# Patient Record
Sex: FEMALE | Race: WHITE | Employment: UNEMPLOYED | URBAN - METROPOLITAN AREA
[De-identification: names, ages, dates, MRNs, and addresses within clinical notes are randomized per-mention and may not be internally consistent; named-entity substitution may affect disease eponyms.]

---
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
## 2019-04-09 ENCOUNTER — APPOINTMENT (OUTPATIENT)
Dept: GENERAL RADIOLOGY | Age: 19
End: 2019-04-09
Payer: COMMERCIAL

## 2019-04-09 ENCOUNTER — HOSPITAL ENCOUNTER (EMERGENCY)
Age: 19
Discharge: HOME | End: 2019-04-09
Payer: COMMERCIAL

## 2019-04-09 VITALS
OXYGEN SATURATION: 100 % | RESPIRATION RATE: 14 BRPM | TEMPERATURE: 98.2 F | BODY MASS INDEX: 24.83 KG/M2 | WEIGHT: 149 LBS | HEIGHT: 65 IN | SYSTOLIC BLOOD PRESSURE: 126 MMHG | DIASTOLIC BLOOD PRESSURE: 69 MMHG | HEART RATE: 105 BPM

## 2019-04-09 DIAGNOSIS — S69.92XA INJURY OF LEFT HAND, INITIAL ENCOUNTER: Primary | ICD-10-CM

## 2019-04-09 PROCEDURE — 73110 X-RAY EXAM OF WRIST: CPT

## 2019-04-09 PROCEDURE — 73130 X-RAY EXAM OF HAND: CPT

## 2019-04-09 PROCEDURE — 99283 EMERGENCY DEPT VISIT LOW MDM: CPT

## 2019-04-09 RX ORDER — NAPROXEN 500 MG/1
500 TABLET ORAL 2 TIMES DAILY
Qty: 20 TABLET | Refills: 0 | Status: SHIPPED | OUTPATIENT
Start: 2019-04-09 | End: 2020-10-14

## 2019-04-09 RX ORDER — POLYETHYLENE GLYCOL 3350 17 G/17G
17 POWDER, FOR SOLUTION ORAL
COMMUNITY
Start: 2013-12-16 | End: 2020-10-14

## 2019-04-09 ASSESSMENT — PAIN SCALES - GENERAL: PAINLEVEL_OUTOF10: 9

## 2019-04-09 ASSESSMENT — PAIN DESCRIPTION - ORIENTATION: ORIENTATION: LEFT

## 2019-04-09 ASSESSMENT — PAIN DESCRIPTION - PAIN TYPE: TYPE: ACUTE PAIN

## 2019-04-09 ASSESSMENT — PAIN DESCRIPTION - LOCATION: LOCATION: HAND

## 2019-04-09 NOTE — ED PROVIDER NOTES
erythema, rash or wounds noted. Neurovascular: Motor deficit: none. Sensory deficit:   none. Pulse deficit: none. Capillary refill: normal.  Fingers:  all            Tenderness:  none. Swelling: None. Deformity: no.              ROM: full range of motion. Skin:  no erythema, rash or wounds noted. Wrist:               Tenderness:  Mild radial and ulnar, no scaphoid tenderness. Swelling: None. Deformity: no.             ROM: full range of motion. Skin:  no erythema, rash or wounds noted. Lymphatics: No lymphangitis or adenopathy noted. Neurological:  Oriented. Motor functions intact. t. Lab / Imaging Results   (All laboratory and radiology results have been personally reviewed by myself)  Labs:  No results found for this visit on 04/09/19. Imaging: All Radiology results interpreted by Radiologist unless otherwise noted. XR WRIST LEFT (MIN 3 VIEWS)   Final Result   No acute findings. XR HAND LEFT (MIN 3 VIEWS)   Final Result   No acute findings. ED Course / Medical Decision Making   Medications - No data to display     Re-examination:  4/9/19       Time: 0351   Patient's symptoms of the same. Results discussed. She understands that she is to call workers comp today to schedule an appointment tomorrow morning. Consult(s):   None    Procedure(s):   none    MDM:    Films were obtained and are negative for fracture. Plan is subsequently for symptom control, limited use and  immobilization with appropriate outpatient follow-up. RICE therapy discussed. She is to call workers compensation today to schedule a follow-up appointment tomorrow morning. Counseling: The emergency provider has spoken with the patient and discussed todays results, in addition to providing specific details for the plan of care and counseling regarding the diagnosis and prognosis.   Questions

## 2019-04-26 ENCOUNTER — OFFICE VISIT (OUTPATIENT)
Dept: NEUROLOGY | Age: 19
End: 2019-04-26
Payer: COMMERCIAL

## 2019-04-26 VITALS
DIASTOLIC BLOOD PRESSURE: 80 MMHG | HEIGHT: 64 IN | WEIGHT: 170 LBS | SYSTOLIC BLOOD PRESSURE: 130 MMHG | BODY MASS INDEX: 29.02 KG/M2

## 2019-04-26 DIAGNOSIS — G43.C1 INTRACTABLE PERIODIC HEADACHE SYNDROME: ICD-10-CM

## 2019-04-26 DIAGNOSIS — F32.A ANXIETY AND DEPRESSION: Chronic | ICD-10-CM

## 2019-04-26 DIAGNOSIS — R51.9 CHRONIC DAILY HEADACHE: Chronic | ICD-10-CM

## 2019-04-26 DIAGNOSIS — G43.809 HEADACHE, VARIANT MIGRAINE: ICD-10-CM

## 2019-04-26 DIAGNOSIS — G44.041 INTRACTABLE CHRONIC PAROXYSMAL HEMICRANIA: Primary | ICD-10-CM

## 2019-04-26 DIAGNOSIS — F41.9 ANXIETY AND DEPRESSION: Chronic | ICD-10-CM

## 2019-04-26 PROBLEM — G43.C0 PERIODIC HEADACHE SYNDROME: Status: ACTIVE | Noted: 2019-04-26

## 2019-04-26 PROCEDURE — 1036F TOBACCO NON-USER: CPT | Performed by: PSYCHIATRY & NEUROLOGY

## 2019-04-26 PROCEDURE — 99204 OFFICE O/P NEW MOD 45 MIN: CPT | Performed by: PSYCHIATRY & NEUROLOGY

## 2019-04-26 PROCEDURE — G8427 DOCREV CUR MEDS BY ELIG CLIN: HCPCS | Performed by: PSYCHIATRY & NEUROLOGY

## 2019-04-26 PROCEDURE — G8419 CALC BMI OUT NRM PARAM NOF/U: HCPCS | Performed by: PSYCHIATRY & NEUROLOGY

## 2019-04-26 RX ORDER — TOPIRAMATE 25 MG/1
TABLET ORAL
Qty: 186 TABLET | Refills: 3 | Status: SHIPPED | OUTPATIENT
Start: 2019-04-26 | End: 2019-06-17 | Stop reason: SINTOL

## 2019-04-26 ASSESSMENT — ENCOUNTER SYMPTOMS
ALLERGIC/IMMUNOLOGIC NEGATIVE: 1
EYES NEGATIVE: 1
RESPIRATORY NEGATIVE: 1
GASTROINTESTINAL NEGATIVE: 1

## 2019-04-26 NOTE — PROGRESS NOTES
TABS tablet Take 40 mg by mouth daily.  Sertraline HCl (ZOLOFT PO) Take  by mouth.  naproxen (NAPROSYN) 500 MG tablet Take 1 tablet by mouth 2 times daily for 10 days 20 tablet 0     No current facility-administered medications for this visit.         No Known Allergies    Patient Active Problem List   Diagnosis    Chronic daily headache    Headache, variant migraine    Anxiety and depression       Past Medical History:   Diagnosis Date    Anxiety and depression 4/26/2019    Chronic daily headache 4/26/2019    Constipation     hx of    Constipation, chronic     Depression     Hallucination     Periodic headache syndrome 4/26/2019       Past Surgical History:   Procedure Laterality Date    FRACTURE SURGERY  july 2014    right ankle    MYRINGOTOMY AND TYMPANOSTOMY TUBE PLACEMENT         Family History   Problem Relation Age of Onset    Migraines Mother      Social History     Socioeconomic History    Marital status: Single     Spouse name: Not on file    Number of children: Not on file    Years of education: Not on file    Highest education level: Not on file   Occupational History    Not on file   Social Needs    Financial resource strain: Not on file    Food insecurity:     Worry: Not on file     Inability: Not on file    Transportation needs:     Medical: Not on file     Non-medical: Not on file   Tobacco Use    Smoking status: Never Smoker    Smokeless tobacco: Never Used   Substance and Sexual Activity    Alcohol use: No    Drug use: No    Sexual activity: Never   Lifestyle    Physical activity:     Days per week: Not on file     Minutes per session: Not on file    Stress: Not on file   Relationships    Social connections:     Talks on phone: Not on file     Gets together: Not on file     Attends Rastafarian service: Not on file     Active member of club or organization: Not on file     Attends meetings of clubs or organizations: Not on file     Relationship status: Not on file  Intimate partner violence:     Fear of current or ex partner: Not on file     Emotionally abused: Not on file     Physically abused: Not on file     Forced sexual activity: Not on file   Other Topics Concern    Not on file   Social History Narrative    Not on file     Review of Systems   Constitutional: Negative. HENT: Negative. Eyes: Negative. Respiratory: Negative. Cardiovascular: Negative. Gastrointestinal: Negative. Endocrine: Negative. Genitourinary: Negative. Musculoskeletal: Negative. Skin: Negative. Allergic/Immunologic: Negative. Neurological: Positive for headaches. [address] pattern, migraine variant   Hematological: Negative. Psychiatric/Behavioral: Positive for dysphoric mood. The patient is nervous/anxious. Neurologic Exam:  /80 (Site: Right Upper Arm, Position: Sitting, Cuff Size: Medium Adult)   Ht 5' 4\" (1.626 m)   Wt 170 lb (77.1 kg)   LMP 03/24/2019   BMI 29.18 kg/m²   General appearance: Alert, cooperative, flat affect, well-nourished, seated on the exam table in the company of her father, no acute distress  HEENT: Normocephalic/atraumatic.   Neck: Supple  Cardiac: RRR  Respiratory: grossly clear  Extremities: No edema, erythema  Skin: No lesions or rashes  Musculoskeletal: Negative bilateral straight leg raising test, no fasciculations or tremor  Mental Status: Alert, oriented ×3  Speech/Language: Clear, fluent  Attention span/Concentration: Mildly reduced with easy distractibility  Affect/Mood: Flat affect, dysthymic mood, decreased facial expression, reduced eye contact  Insight/Judgement: Unable to accurately assess     Fund of Knowledge/Current events: Grossly intact  CN II-XII:     Pupils: Equal, reactive to light, 1.5 mm     EOM's: Full without nystagmus    Visual Fields: Full to confrontation    Fundi: Miosis from light  CN V: normal V1-V3  CN VII: No facial droop, symmetric smile, decreased facial expression   CN VIII: Hearing grossly intact   CN IX-XII: No palatal asymmetry, tongue midline  SCM/Trapezii: 5/5 power  Motor: 5/5 power in the upper and lower extremities without tremor or drift and normal motor tone without cogwheeling or spasticity; intact fine motor function of both hands, symmetric  DTR's: 1+ and symmetric in the upper and lower extremities, no ankle clonus, plantar responses are flexor  Sensory: grossly intact subjectively to light touch and sharp stick testing  Coordination/Gait: No gross limb dysmetria, truncal or cerebellar gait ataxia. Assessment/Plan:  1. 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 2. Chronic daily headache pattern. 3. Comorbidities of anxiety/depression, dysthymia, treated with psychotropic medications of Zoloft and Latuda. 4. For preventative headache pain management, we discussed the 3 major preventative headache medications including vascular, antidepressant and seizure pain medications. Because of the medications she is already being prescribed, the antidepressant pain medications cannot be utilized such as Elavil, nortriptyline or doxepin. We decided to try topiramate 25 mg twice daily for 1 week, then advance by 25 mg doses weekly to 25 mg the morning and 50 mg at bedtime for 1 week, then 50 mg twice daily for 1 week, then advance further to 50 mg in the morning and 75 mg at bedtime for 1 week, then 75 mg twice daily as clinically indicated and as tolerated. 5. Additional lab tests are ordered as specified below. 6. She was advised to keep a headache calendar and follow-up in the Neurology clinic in 6 weeks otherwise. 7. She was provided patient information from the [email or web address] [facility] website. Sincerely,      Cedric Emmanuel MD    This note was created using speech recognition transcription software.  Despite proofreading, there may be several typographical errors present that may affect the meaning of the content. Please call with any questions. Note: More than 50% of this 40-minute face-face visit time included counseling and coordination of care based on clinical impression, review of test results, treatment plan, risk factor reduction and patient and/or family education.     Orders Placed This Encounter   Procedures    CBC WITH DIFFERENTIAL/PLATELET     Standing Status:   Future     Standing Expiration Date:   4/26/2020    Comprehensive Metabolic Panel     Standing Status:   Future     Standing Expiration Date:   4/26/2020    Magnesium     Standing Status:   Future     Standing Expiration Date:   4/26/2020    Vitamin B12 & Folate     Standing Status:   Future     Standing Expiration Date:   4/26/2020    Vitamin D 25 Hydroxy     Standing Status:   Future     Standing Expiration Date:   4/26/2020    RPR Reflex to Titer and TPPA     Standing Status:   Future     Standing Expiration Date:   4/26/2020    Sedimentation Rate     Standing Status:   Future     Standing Expiration Date:   4/26/2020    C-Reactive Protein     Standing Status:   Future     Standing Expiration Date:   4/26/2020    Ammonia     Standing Status:   Future     Standing Expiration Date:   4/26/2020

## 2019-05-08 ENCOUNTER — TELEPHONE (OUTPATIENT)
Dept: NEUROLOGY | Age: 19
End: 2019-05-08

## 2019-05-08 ENCOUNTER — HOSPITAL ENCOUNTER (OUTPATIENT)
Age: 19
Discharge: HOME | End: 2019-05-08
Payer: COMMERCIAL

## 2019-05-08 DIAGNOSIS — G44.041 INTRACTABLE CHRONIC PAROXYSMAL HEMICRANIA: ICD-10-CM

## 2019-05-08 DIAGNOSIS — G43.809 HEADACHE, VARIANT MIGRAINE: ICD-10-CM

## 2019-05-08 LAB
ALBUMIN SERPL-MCNC: 4.3 G/DL (ref 3.5–5.2)
ALP BLD-CCNC: 65 U/L (ref 35–104)
ALT SERPL-CCNC: 14 U/L (ref 0–32)
AMMONIA: 20 UMOL/L (ref 11–51)
ANION GAP SERPL CALCULATED.3IONS-SCNC: 9 MMOL/L (ref 7–16)
AST SERPL-CCNC: 25 U/L (ref 0–31)
BASOPHILS ABSOLUTE: 0.04 E9/L (ref 0–0.2)
BASOPHILS RELATIVE PERCENT: 1.1 % (ref 0–2)
BILIRUB SERPL-MCNC: 0.4 MG/DL (ref 0–1.2)
BUN BLDV-MCNC: 15 MG/DL (ref 6–20)
C-REACTIVE PROTEIN: 0.1 MG/DL (ref 0–0.4)
CALCIUM SERPL-MCNC: 9.5 MG/DL (ref 8.6–10.2)
CHLORIDE BLD-SCNC: 107 MMOL/L (ref 98–107)
CO2: 24 MMOL/L (ref 22–29)
CREAT SERPL-MCNC: 0.8 MG/DL (ref 0.5–1)
EOSINOPHILS ABSOLUTE: 0.09 E9/L (ref 0.05–0.5)
EOSINOPHILS RELATIVE PERCENT: 2.5 % (ref 0–6)
FOLATE: 9.4 NG/ML (ref 4.8–24.2)
GFR AFRICAN AMERICAN: >60
GFR NON-AFRICAN AMERICAN: >60 ML/MIN/1.73
GLUCOSE BLD-MCNC: 97 MG/DL (ref 74–99)
HCT VFR BLD CALC: 38.4 % (ref 34–48)
HEMOGLOBIN: 13.1 G/DL (ref 11.5–15.5)
IMMATURE GRANULOCYTES #: 0.01 E9/L
IMMATURE GRANULOCYTES %: 0.3 % (ref 0–5)
LYMPHOCYTES ABSOLUTE: 1.87 E9/L (ref 1.5–4)
LYMPHOCYTES RELATIVE PERCENT: 52.5 % (ref 20–42)
MAGNESIUM: 2.3 MG/DL (ref 1.6–2.6)
MCH RBC QN AUTO: 30.6 PG (ref 26–35)
MCHC RBC AUTO-ENTMCNC: 34.1 % (ref 32–34.5)
MCV RBC AUTO: 89.7 FL (ref 80–99.9)
MONOCYTES ABSOLUTE: 0.37 E9/L (ref 0.1–0.95)
MONOCYTES RELATIVE PERCENT: 10.4 % (ref 2–12)
NEUTROPHILS ABSOLUTE: 1.18 E9/L (ref 1.8–7.3)
NEUTROPHILS RELATIVE PERCENT: 33.2 % (ref 43–80)
PDW BLD-RTO: 12.4 FL (ref 11.5–15)
PLATELET # BLD: 290 E9/L (ref 130–450)
PMV BLD AUTO: 10.6 FL (ref 7–12)
POTASSIUM SERPL-SCNC: 4 MMOL/L (ref 3.5–5)
RBC # BLD: 4.28 E12/L (ref 3.5–5.5)
SEDIMENTATION RATE, ERYTHROCYTE: 4 MM/HR (ref 0–20)
SODIUM BLD-SCNC: 140 MMOL/L (ref 132–146)
TOTAL PROTEIN: 7.1 G/DL (ref 6.4–8.3)
VITAMIN B-12: 421 PG/ML (ref 211–946)
VITAMIN D 25-HYDROXY: 11 NG/ML (ref 30–100)
WBC # BLD: 3.6 E9/L (ref 4.5–11.5)

## 2019-05-08 PROCEDURE — 80053 COMPREHEN METABOLIC PANEL: CPT

## 2019-05-08 PROCEDURE — 85025 COMPLETE CBC W/AUTO DIFF WBC: CPT

## 2019-05-08 PROCEDURE — 36415 COLL VENOUS BLD VENIPUNCTURE: CPT

## 2019-05-08 PROCEDURE — 82306 VITAMIN D 25 HYDROXY: CPT

## 2019-05-08 PROCEDURE — 82607 VITAMIN B-12: CPT

## 2019-05-08 PROCEDURE — 82746 ASSAY OF FOLIC ACID SERUM: CPT

## 2019-05-08 PROCEDURE — 83735 ASSAY OF MAGNESIUM: CPT

## 2019-05-08 PROCEDURE — 85651 RBC SED RATE NONAUTOMATED: CPT

## 2019-05-08 PROCEDURE — 86592 SYPHILIS TEST NON-TREP QUAL: CPT

## 2019-05-08 PROCEDURE — 82140 ASSAY OF AMMONIA: CPT

## 2019-05-08 PROCEDURE — 86140 C-REACTIVE PROTEIN: CPT

## 2019-05-08 NOTE — TELEPHONE ENCOUNTER
----- Message from Ann Marie Jackson MD sent at 5/8/2019  3:26 PM EDT -----  Please notify patient of abnormal lab results: CBC is abnl.-dec. WBC-recommend she follow-up with her PCP office for further advisement.

## 2019-05-09 ENCOUNTER — TELEPHONE (OUTPATIENT)
Dept: NEUROLOGY | Age: 19
End: 2019-05-09

## 2019-05-09 LAB — RPR: NORMAL

## 2019-05-09 NOTE — TELEPHONE ENCOUNTER
----- Message from Gavi Islas MD sent at 5/8/2019  3:59 PM EDT -----  Please notify patient of abnormal lab results: low vitamin D level of 11-contact her PCP office for further advice, tx

## 2019-05-09 NOTE — TELEPHONE ENCOUNTER
Per Dr. Robb Millan pt notified of low vit D level, advised to contact PCP office for further advisement. Pt verbalized understanding.

## 2019-06-17 ENCOUNTER — OFFICE VISIT (OUTPATIENT)
Dept: NEUROLOGY | Age: 19
End: 2019-06-17
Payer: COMMERCIAL

## 2019-06-17 VITALS
BODY MASS INDEX: 29.02 KG/M2 | DIASTOLIC BLOOD PRESSURE: 70 MMHG | HEIGHT: 64 IN | WEIGHT: 170 LBS | SYSTOLIC BLOOD PRESSURE: 118 MMHG

## 2019-06-17 DIAGNOSIS — E55.9 VITAMIN D DEFICIENCY: Chronic | ICD-10-CM

## 2019-06-17 DIAGNOSIS — G43.809 HEADACHE, VARIANT MIGRAINE: Primary | ICD-10-CM

## 2019-06-17 DIAGNOSIS — R51.9 CHRONIC DAILY HEADACHE: ICD-10-CM

## 2019-06-17 PROCEDURE — 99212 OFFICE O/P EST SF 10 MIN: CPT | Performed by: PSYCHIATRY & NEUROLOGY

## 2019-06-17 PROCEDURE — G8427 DOCREV CUR MEDS BY ELIG CLIN: HCPCS | Performed by: PSYCHIATRY & NEUROLOGY

## 2019-06-17 PROCEDURE — G8419 CALC BMI OUT NRM PARAM NOF/U: HCPCS | Performed by: PSYCHIATRY & NEUROLOGY

## 2019-06-17 PROCEDURE — 1036F TOBACCO NON-USER: CPT | Performed by: PSYCHIATRY & NEUROLOGY

## 2019-06-17 RX ORDER — PROPRANOLOL HYDROCHLORIDE 10 MG/1
10 TABLET ORAL 3 TIMES DAILY
Qty: 90 TABLET | Refills: 5 | Status: SHIPPED | OUTPATIENT
Start: 2019-06-17 | End: 2019-12-16 | Stop reason: DRUGHIGH

## 2019-06-17 RX ORDER — MULTIVIT-MIN/IRON/FOLIC ACID/K 18-600-40
2000 CAPSULE ORAL DAILY
COMMUNITY
Start: 2019-05-09 | End: 2020-10-14

## 2019-06-17 NOTE — PROGRESS NOTES
Neurology Progress Note, Follow-up:    Patient: Carie Woody  : 2000  Date: 19  Primary provider: Nadir Pantoja DO;Dr. Desi Paez:    Re: Followup: chronic variant migraines, chronic daily headache pattern. Dear Nadir Pantoja DO; Dr. Desi Paez:    I have seen Sharona for a follow-up office visit this morning in regards to her history of chronic variant migraines and chronic daily headache pattern. She now reports that her headaches are primarily right-sided. She reports that the trial of topiramate only worsened her headaches and in frequency and intensity and she was able to advance to 75 mg twice daily. I have recommended she discontinue topiramate due to medication intolerance. Review of a few other options include regular propranolol starting 10 mg thrice to 3 times daily which could be advanced to 20 mg twice to 3 times daily as tolerated and if clinically beneficial.  We cannot add an antidepressant pain medication due to her Zoloft prescription and addition of divalproex  mg may be contraindicated with Latuda and would need to be approved and monitored by her behavioral health provider. Lab data: Lab tests reviewed from 19, shows a low vitamin D level of 11, WBC 3.6, absolute neutrophil count 1.18, normal CRP of 0.1, normal LFTs, blood glucose 97, magnesium level 2.3, normal BMP, vitamin B12 level of 247 and folic acid 9.4, ESR 4, nonreactive RPR. Refer to the prior Neurology consult note of 2019 for additional information if needed. Current Outpatient Medications   Medication Sig Dispense Refill    Cholecalciferol (VITAMIN D) 2000 units CAPS capsule Take 2,000 Units by mouth daily      propranolol (INDERAL) 10 MG tablet Take 1 tablet by mouth 3 times daily 90 tablet 5    polyethylene glycol (MIRALAX) powder Take 17 g by mouth      lurasidone (LATUDA) 40 MG TABS tablet Take 40 mg by mouth daily.  Sertraline HCl (ZOLOFT PO) Take  by mouth.  naproxen (NAPROSYN) 500 MG tablet Take 1 tablet by mouth 2 times daily for 10 days 20 tablet 0     No current facility-administered medications for this visit.         Allergies   Allergen Reactions    Topiramate Other (See Comments)     Worsening of headaches       Patient Active Problem List   Diagnosis    Chronic daily headache    Headache, variant migraine    Anxiety and depression    Vitamin D deficiency       Past Medical History:   Diagnosis Date    Anxiety and depression 4/26/2019    Chronic daily headache 4/26/2019    Constipation     hx of    Constipation, chronic     Depression     Hallucination     Periodic headache syndrome 4/26/2019    Vitamin D deficiency 6/17/2019       Past Surgical History:   Procedure Laterality Date    FRACTURE SURGERY  july 2014    right ankle    MYRINGOTOMY AND TYMPANOSTOMY TUBE PLACEMENT         Family History   Problem Relation Age of Onset    Migraines Mother        Social History     Socioeconomic History    Marital status: Single     Spouse name: Not on file    Number of children: Not on file    Years of education: Not on file    Highest education level: Not on file   Occupational History    Not on file   Social Needs    Financial resource strain: Not on file    Food insecurity:     Worry: Not on file     Inability: Not on file    Transportation needs:     Medical: Not on file     Non-medical: Not on file   Tobacco Use    Smoking status: Never Smoker    Smokeless tobacco: Never Used   Substance and Sexual Activity    Alcohol use: No    Drug use: No    Sexual activity: Never   Lifestyle    Physical activity:     Days per week: Not on file     Minutes per session: Not on file    Stress: Not on file   Relationships    Social connections:     Talks on phone: Not on file     Gets together: Not on file     Attends Pentecostal service: Not on file     Active member of club or organization: Not on file     Attends meetings of clubs or organizations: Not on file     Relationship status: Not on file    Intimate partner violence:     Fear of current or ex partner: Not on file     Emotionally abused: Not on file     Physically abused: Not on file     Forced sexual activity: Not on file   Other Topics Concern    Not on file   Social History Narrative    Not on file     Review of Systems   Neurological: Positive for headaches. (14-point review of systems is unchanged compared to prior Neurology consult note of 4/26/2019.)    Neurologic Exam:  /70 (Site: Right Upper Arm, Position: Sitting, Cuff Size: Medium Adult)   Ht 5' 4\" (1.626 m)   Wt 170 lb (77.1 kg)   BMI 29.18 kg/m²    Mental Status: Alert, oriented x3. Speech is clear and language fluent. Comprehension ability is grossly intact. Attention span and concentration is grossly normal.  Affect is constricted and mood appears dysthymic with decreased facial expression. CN's II-XII: Remains grossly intact throughout. Pupils are equal and reactive to light, 1.5 mm. EOMs are intact without nystagmus. Visual fields are full. Facial expression is decreased and facial sensation is normal.  Hearing is grossly intact. The tongue is midline. Motor/Sensory Exam: Grossly intact 5/5 power in the upper and lower extremities without tremor or drift and normal motor tone. There are no pathologic reflexes. Sensory modalities remain grossly intact subjectively throughout. Coordination/Gait: No gross limb dysmetria, truncal or cerebellar gait ataxia. Assessment/Plan:   1. Chronic variant migraine headache pain syndrome now reported with right hemicranial preference, chronic daily headache pattern. 2.  She was advised to discontinue topiramate due to medication intolerance.   3. Review of a few other options include regular propranolol starting 10 mg thrice to 3 times daily which could be advanced to 20 mg twice to 3 times daily as tolerated and if clinically beneficial.  We cannot add an antidepressant pain medication due to her Zoloft prescription and addition of divalproex  mg may be contraindicated with Latuda and would need to be approved and monitored by her behavioral health provider. 4.  Follow-up in the Neurology clinic in 6 months on an as needed basis, if clinically indicated. She was recommended to call the office to report her response and progress to the propranolol medication trial in 2 to 4 weeks and if ineffective or unable to tolerate, should discontinue the medication altogether. Sincerely,      Areli Amezcua MD    Please note this report has been partially produced using speech recognition software and may cause contain errors related to that system including grammar, punctuation and spelling or words and phrases that may not seem appropriate. If there are questions or concerns please feel free to contact me to clarify.

## 2019-10-16 ENCOUNTER — HOSPITAL ENCOUNTER (OUTPATIENT)
Age: 19
Discharge: HOME | End: 2019-10-16
Payer: COMMERCIAL

## 2019-10-16 LAB
ALBUMIN SERPL-MCNC: 4.4 G/DL (ref 3.5–5.2)
ALP BLD-CCNC: 56 U/L (ref 35–104)
ALT SERPL-CCNC: 15 U/L (ref 0–32)
AMYLASE: 44 U/L (ref 20–100)
AST SERPL-CCNC: 23 U/L (ref 0–31)
BASOPHILS ABSOLUTE: 0.05 E9/L (ref 0–0.2)
BASOPHILS RELATIVE PERCENT: 0.8 % (ref 0–2)
BILIRUB SERPL-MCNC: 0.6 MG/DL (ref 0–1.2)
BILIRUBIN DIRECT: <0.2 MG/DL (ref 0–0.3)
BILIRUBIN, INDIRECT: NORMAL MG/DL (ref 0–1)
EOSINOPHILS ABSOLUTE: 0.14 E9/L (ref 0.05–0.5)
EOSINOPHILS RELATIVE PERCENT: 2.4 % (ref 0–6)
HCT VFR BLD CALC: 37 % (ref 34–48)
HEMOGLOBIN: 12.5 G/DL (ref 11.5–15.5)
IMMATURE GRANULOCYTES #: 0.02 E9/L
IMMATURE GRANULOCYTES %: 0.3 % (ref 0–5)
LIPASE: 13 U/L (ref 13–60)
LYMPHOCYTES ABSOLUTE: 1.71 E9/L (ref 1.5–4)
LYMPHOCYTES RELATIVE PERCENT: 28.8 % (ref 20–42)
MCH RBC QN AUTO: 31.2 PG (ref 26–35)
MCHC RBC AUTO-ENTMCNC: 33.8 % (ref 32–34.5)
MCV RBC AUTO: 92.3 FL (ref 80–99.9)
MONOCYTES ABSOLUTE: 0.33 E9/L (ref 0.1–0.95)
MONOCYTES RELATIVE PERCENT: 5.6 % (ref 2–12)
NEUTROPHILS ABSOLUTE: 3.68 E9/L (ref 1.8–7.3)
NEUTROPHILS RELATIVE PERCENT: 62.1 % (ref 43–80)
PDW BLD-RTO: 12.2 FL (ref 11.5–15)
PLATELET # BLD: 247 E9/L (ref 130–450)
PMV BLD AUTO: 10.8 FL (ref 7–12)
RBC # BLD: 4.01 E12/L (ref 3.5–5.5)
TOTAL PROTEIN: 7.1 G/DL (ref 6.4–8.3)
WBC # BLD: 5.9 E9/L (ref 4.5–11.5)

## 2019-10-16 PROCEDURE — 85025 COMPLETE CBC W/AUTO DIFF WBC: CPT

## 2019-10-16 PROCEDURE — 83690 ASSAY OF LIPASE: CPT

## 2019-10-16 PROCEDURE — 36415 COLL VENOUS BLD VENIPUNCTURE: CPT

## 2019-10-16 PROCEDURE — 80076 HEPATIC FUNCTION PANEL: CPT

## 2019-10-16 PROCEDURE — 82150 ASSAY OF AMYLASE: CPT

## 2019-12-16 ENCOUNTER — OFFICE VISIT (OUTPATIENT)
Dept: NEUROLOGY | Age: 19
End: 2019-12-16
Payer: COMMERCIAL

## 2019-12-16 VITALS
WEIGHT: 170 LBS | SYSTOLIC BLOOD PRESSURE: 110 MMHG | BODY MASS INDEX: 29.02 KG/M2 | HEIGHT: 64 IN | DIASTOLIC BLOOD PRESSURE: 80 MMHG

## 2019-12-16 DIAGNOSIS — F32.A ANXIETY AND DEPRESSION: ICD-10-CM

## 2019-12-16 DIAGNOSIS — F98.8 ATTENTION DEFICIT DISORDER, UNSPECIFIED HYPERACTIVITY PRESENCE: ICD-10-CM

## 2019-12-16 DIAGNOSIS — F41.9 ANXIETY AND DEPRESSION: ICD-10-CM

## 2019-12-16 DIAGNOSIS — G43.809 MIGRAINE VARIANT WITH HEADACHE: Primary | ICD-10-CM

## 2019-12-16 PROCEDURE — G8419 CALC BMI OUT NRM PARAM NOF/U: HCPCS | Performed by: PSYCHIATRY & NEUROLOGY

## 2019-12-16 PROCEDURE — 1036F TOBACCO NON-USER: CPT | Performed by: PSYCHIATRY & NEUROLOGY

## 2019-12-16 PROCEDURE — G8427 DOCREV CUR MEDS BY ELIG CLIN: HCPCS | Performed by: PSYCHIATRY & NEUROLOGY

## 2019-12-16 PROCEDURE — G8484 FLU IMMUNIZE NO ADMIN: HCPCS | Performed by: PSYCHIATRY & NEUROLOGY

## 2019-12-16 PROCEDURE — 99213 OFFICE O/P EST LOW 20 MIN: CPT | Performed by: PSYCHIATRY & NEUROLOGY

## 2019-12-16 RX ORDER — METHYLPHENIDATE HYDROCHLORIDE 54 MG/1
54 TABLET, EXTENDED RELEASE ORAL EVERY MORNING
COMMUNITY
End: 2020-10-27 | Stop reason: ALTCHOICE

## 2019-12-16 RX ORDER — PROPRANOLOL HCL 60 MG
60 CAPSULE, EXTENDED RELEASE 24HR ORAL DAILY
Qty: 30 CAPSULE | Refills: 5 | Status: SHIPPED
Start: 2019-12-16 | End: 2020-06-15 | Stop reason: DRUGHIGH

## 2020-01-09 ENCOUNTER — HOSPITAL ENCOUNTER (OUTPATIENT)
Age: 20
Discharge: HOME | End: 2020-01-11

## 2020-01-09 PROCEDURE — 88342 IMHCHEM/IMCYTCHM 1ST ANTB: CPT

## 2020-01-09 PROCEDURE — 88305 TISSUE EXAM BY PATHOLOGIST: CPT

## 2020-06-15 ENCOUNTER — OFFICE VISIT (OUTPATIENT)
Dept: NEUROLOGY | Age: 20
End: 2020-06-15
Payer: COMMERCIAL

## 2020-06-15 VITALS
BODY MASS INDEX: 25.33 KG/M2 | SYSTOLIC BLOOD PRESSURE: 120 MMHG | WEIGHT: 152 LBS | TEMPERATURE: 98.6 F | DIASTOLIC BLOOD PRESSURE: 80 MMHG | HEIGHT: 65 IN

## 2020-06-15 PROBLEM — G44.209 MUSCLE CONTRACTION HEADACHE: Chronic | Status: ACTIVE | Noted: 2020-06-15

## 2020-06-15 PROCEDURE — G8419 CALC BMI OUT NRM PARAM NOF/U: HCPCS | Performed by: PSYCHIATRY & NEUROLOGY

## 2020-06-15 PROCEDURE — G8427 DOCREV CUR MEDS BY ELIG CLIN: HCPCS | Performed by: PSYCHIATRY & NEUROLOGY

## 2020-06-15 PROCEDURE — 1036F TOBACCO NON-USER: CPT | Performed by: PSYCHIATRY & NEUROLOGY

## 2020-06-15 PROCEDURE — 99213 OFFICE O/P EST LOW 20 MIN: CPT | Performed by: PSYCHIATRY & NEUROLOGY

## 2020-06-15 RX ORDER — PROPRANOLOL HYDROCHLORIDE 80 MG/1
CAPSULE, EXTENDED RELEASE ORAL
Qty: 30 CAPSULE | Refills: 5 | Status: SHIPPED
Start: 2020-06-15 | End: 2020-10-27 | Stop reason: SDUPTHER

## 2020-06-15 ASSESSMENT — ENCOUNTER SYMPTOMS
ALLERGIC/IMMUNOLOGIC NEGATIVE: 1
GASTROINTESTINAL NEGATIVE: 1
EYES NEGATIVE: 1
RESPIRATORY NEGATIVE: 1

## 2020-06-15 NOTE — PROGRESS NOTES
Neurology Progress Note, Follow-up:    Patient: Kathy Brown  : 2000  Date: 06/15/20  Primary provider: Mily Jack DO     Re: Followup OV, chronic episodic migraines    Dear Mily Jack DO    I have seen Sharona for follow-up office visit in regards to her history of chronic episodic headaches. Regarding migraine prevention, she was last titrated to propranolol LA 60 mg daily. She explains that this does help her migraines several hours after she takes it but after several hours her headache returns. 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 She was also advised to check with her behavioral health specialist as to whether or not she could receive a low-dose trial of divalproex  mg to 500 mg daily for prophylactic migraine treatment in view of her current Latuda and methylphenidate prescriptions, but she did not do so and she reports she is no longer taking [place] was just placed on a new medication she takes at bedtime which also helps her sleep. She continues methylphenidate also. Her migraines have not changed in location or character and are somewhat better in regards to severity and frequency and response to propranolol LA 60 mg daily. I shall increase propranolol LA to 80 mg daily. Please refer to the prior Neurology consult letter of 2019 for additional information if needed. ROS, family/social, medication/allergy list: Reviewed, updated. Current Outpatient Medications   Medication Sig Dispense Refill    propranolol (INDERAL LA) 80 MG extended release capsule Take one daily, migraine 30 capsule 5    Methylphenidate (CONCERTA) 54 MG CR tablet Take 54 mg by mouth every morning.       polyethylene glycol (MIRALAX) powder Take 17 g by mouth      naproxen (NAPROSYN) 500 MG tablet Take 1 tablet by mouth 2 times daily for 10 days 20 tablet 0    Cholecalciferol (VITAMIN D) 2000 units CAPS capsule Take 2,000 Units by mouth daily       No current facility-administered medications for this visit.         Allergies   Allergen Reactions    Topiramate Other (See Comments)     Worsening of headaches       Patient Active Problem List   Diagnosis    Chronic daily headache    Headache, variant migraine    Anxiety and depression    Vitamin D deficiency       Past Medical History:   Diagnosis Date    Anxiety and depression 4/26/2019    Chronic daily headache 4/26/2019    Constipation     hx of    Constipation, chronic     Depression     Hallucination     Periodic headache syndrome 4/26/2019    Vitamin D deficiency 6/17/2019       Past Surgical History:   Procedure Laterality Date    FRACTURE SURGERY  july 2014    right ankle    MYRINGOTOMY AND TYMPANOSTOMY TUBE PLACEMENT         Family History   Problem Relation Age of Onset    Migraines Mother        Social History     Socioeconomic History    Marital status: Single     Spouse name: Not on file    Number of children: Not on file    Years of education: Not on file    Highest education level: Not on file   Occupational History    Not on file   Social Needs    Financial resource strain: Not on file    Food insecurity     Worry: Not on file     Inability: Not on file    Transportation needs     Medical: Not on file     Non-medical: Not on file   Tobacco Use    Smoking status: Never Smoker    Smokeless tobacco: Never Used   Substance and Sexual Activity    Alcohol use: No    Drug use: No    Sexual activity: Never   Lifestyle    Physical activity     Days per week: Not on file     Minutes per session: Not on file    Stress: Not on file   Relationships    Social connections     Talks on phone: Not on file     Gets together: Not on file     Attends U[employer] service: Not on file     Active member of club or organization: Not on file     Attends meetings of clubs or organizations: Not on file     Relationship status: Not on file   [address] [place] [address] reports she started a new medication from her psychiatrist which she takes at nighttime and which also helps her sleep. She has also again requested to check with her psychiatrist whether divalproex  mg - 500 mg daily could be an option for migraine prophylaxis given her other psychotropic medications. 3.  Follow-up in the Neurology clinic in 4 months otherwise. Sincerely,      Issa Correa MD    Please note this report has been partially produced using speech recognition software and may cause contain errors related to that system including grammar, punctuation and spelling or words and phrases that may not seem appropriate. If there are questions or concerns please feel free to contact me to clarify.

## 2020-09-07 ENCOUNTER — APPOINTMENT (OUTPATIENT)
Dept: GENERAL RADIOLOGY | Age: 20
End: 2020-09-07
Payer: COMMERCIAL

## 2020-09-07 ENCOUNTER — HOSPITAL ENCOUNTER (EMERGENCY)
Age: 20
Discharge: HOME | End: 2020-09-07
Attending: EMERGENCY MEDICINE
Payer: COMMERCIAL

## 2020-09-07 VITALS
DIASTOLIC BLOOD PRESSURE: 72 MMHG | TEMPERATURE: 97.4 F | RESPIRATION RATE: 16 BRPM | OXYGEN SATURATION: 100 % | SYSTOLIC BLOOD PRESSURE: 122 MMHG | HEART RATE: 92 BPM

## 2020-09-07 LAB
ANION GAP SERPL CALCULATED.3IONS-SCNC: 13 MMOL/L (ref 7–16)
BUN BLDV-MCNC: 12 MG/DL (ref 6–20)
CALCIUM SERPL-MCNC: 9.6 MG/DL (ref 8.6–10.2)
CHLORIDE BLD-SCNC: 104 MMOL/L (ref 98–107)
CO2: 23 MMOL/L (ref 22–29)
CREAT SERPL-MCNC: 0.8 MG/DL (ref 0.5–1)
D DIMER: <200 NG/ML DDU
GFR AFRICAN AMERICAN: >60
GFR NON-AFRICAN AMERICAN: >60 ML/MIN/1.73
GLUCOSE BLD-MCNC: 101 MG/DL (ref 74–99)
HCG, URINE, POC: NEGATIVE
Lab: NORMAL
NEGATIVE QC PASS/FAIL: NORMAL
POSITIVE QC PASS/FAIL: NORMAL
POTASSIUM SERPL-SCNC: 3.4 MMOL/L (ref 3.5–5)
SODIUM BLD-SCNC: 140 MMOL/L (ref 132–146)

## 2020-09-07 PROCEDURE — 99284 EMERGENCY DEPT VISIT MOD MDM: CPT

## 2020-09-07 PROCEDURE — 93005 ELECTROCARDIOGRAM TRACING: CPT | Performed by: EMERGENCY MEDICINE

## 2020-09-07 PROCEDURE — 85025 COMPLETE CBC W/AUTO DIFF WBC: CPT

## 2020-09-07 PROCEDURE — 99285 EMERGENCY DEPT VISIT HI MDM: CPT

## 2020-09-07 PROCEDURE — 80048 BASIC METABOLIC PNL TOTAL CA: CPT

## 2020-09-07 PROCEDURE — 71046 X-RAY EXAM CHEST 2 VIEWS: CPT

## 2020-09-07 PROCEDURE — 6370000000 HC RX 637 (ALT 250 FOR IP): Performed by: EMERGENCY MEDICINE

## 2020-09-07 PROCEDURE — 85378 FIBRIN DEGRADE SEMIQUANT: CPT

## 2020-09-07 RX ORDER — IBUPROFEN 600 MG/1
600 TABLET ORAL ONCE
Status: COMPLETED | OUTPATIENT
Start: 2020-09-07 | End: 2020-09-07

## 2020-09-07 RX ADMIN — IBUPROFEN 600 MG: 600 TABLET, FILM COATED ORAL at 21:23

## 2020-09-07 ASSESSMENT — PAIN SCALES - GENERAL
PAINLEVEL_OUTOF10: 9
PAINLEVEL_OUTOF10: 9
PAINLEVEL_OUTOF10: 6

## 2020-09-07 ASSESSMENT — ENCOUNTER SYMPTOMS
CHEST TIGHTNESS: 0
BACK PAIN: 0
ABDOMINAL PAIN: 0
COUGH: 0
VOMITING: 0
SHORTNESS OF BREATH: 1
WHEEZING: 0
SORE THROAT: 0
NAUSEA: 0
ABDOMINAL DISTENTION: 0
DIARRHEA: 0
SINUS PRESSURE: 0

## 2020-09-07 ASSESSMENT — PAIN DESCRIPTION - LOCATION: LOCATION: CHEST

## 2020-09-08 ENCOUNTER — CARE COORDINATION (OUTPATIENT)
Dept: CARE COORDINATION | Age: 20
End: 2020-09-08

## 2020-09-08 LAB
BASOPHILS ABSOLUTE: 0 E9/L (ref 0–0.2)
BASOPHILS RELATIVE PERCENT: 2 % (ref 0–2)
EKG ATRIAL RATE: 90 BPM
EKG P AXIS: 44 DEGREES
EKG P-R INTERVAL: 134 MS
EKG Q-T INTERVAL: 350 MS
EKG QRS DURATION: 84 MS
EKG QTC CALCULATION (BAZETT): 428 MS
EKG R AXIS: 29 DEGREES
EKG T AXIS: 43 DEGREES
EKG VENTRICULAR RATE: 90 BPM
EOSINOPHILS ABSOLUTE: 0.11 E9/L (ref 0.05–0.5)
EOSINOPHILS RELATIVE PERCENT: 1.7 % (ref 0–6)
HCT VFR BLD CALC: 34.7 % (ref 34–48)
HEMOGLOBIN: 12.1 G/DL (ref 11.5–15.5)
LYMPHOCYTES ABSOLUTE: 2.9 E9/L (ref 1.5–4)
LYMPHOCYTES RELATIVE PERCENT: 46.1 % (ref 20–42)
MCH RBC QN AUTO: 31.3 PG (ref 26–35)
MCHC RBC AUTO-ENTMCNC: 34.9 % (ref 32–34.5)
MCV RBC AUTO: 89.9 FL (ref 80–99.9)
MONOCYTES ABSOLUTE: 0.25 E9/L (ref 0.1–0.95)
MONOCYTES RELATIVE PERCENT: 3.5 % (ref 2–12)
NEUTROPHILS ABSOLUTE: 3.09 E9/L (ref 1.8–7.3)
NEUTROPHILS RELATIVE PERCENT: 48.7 % (ref 43–80)
PDW BLD-RTO: 12.5 FL (ref 11.5–15)
PLATELET # BLD: 246 E9/L (ref 130–450)
PMV BLD AUTO: 10.9 FL (ref 7–12)
RBC # BLD: 3.86 E12/L (ref 3.5–5.5)
RBC # BLD: NORMAL 10*6/UL
WBC # BLD: 6.3 E9/L (ref 4.5–11.5)

## 2020-09-08 NOTE — ED PROVIDER NOTES
Chief complaint:  Short of breath    HPI history provided by the patient  Patient comes in complaining of shortness of breath. She states that this evening just prior to coming into the ER she had a burning sensation in her left arm, she states her cousin was then massaging or stretching her left arm and did something funny and they began to laugh together and then the patient was coughing while she was trying to stop laughing and developed general chest pain with breathing and felt short of breath after that. She states she still has about the same symptoms. No actual extremity swelling, no calf pain. No recent illnesses, no URI symptoms. No fevers. No focal chest pain. No treatment prior to arrival.  Deep breathing and laughing make her symptoms worse. No history of blood clots. No recent traveling or surgeries. No history of blood clots. Review of Systems   Constitutional: Negative for chills, diaphoresis, fatigue and fever. HENT: Negative for congestion, sinus pressure and sore throat. Respiratory: Positive for shortness of breath. Negative for cough, chest tightness and wheezing. Cardiovascular: Positive for chest pain. Negative for palpitations and leg swelling. Gastrointestinal: Negative for abdominal distention, abdominal pain, diarrhea, nausea and vomiting. Genitourinary: Negative for dysuria, flank pain and frequency. Musculoskeletal: Negative for arthralgias, back pain, gait problem, joint swelling, myalgias, neck pain and neck stiffness. Skin: Negative for rash and wound. Neurological: Negative for dizziness, syncope, weakness, light-headedness, numbness and headaches. Hematological: Negative for adenopathy. All other systems reviewed and are negative. Physical Exam  Vitals signs and nursing note reviewed. Constitutional:       General: She is not in acute distress. Appearance: She is well-developed. She is not ill-appearing, toxic-appearing or diaphoretic. Comments: Patient anxious, fidgeting in the room, constantly fidgeting with both hands and snapping her fingers. HENT:      Head: Normocephalic and atraumatic. Eyes:      General: No scleral icterus. Pupils: Pupils are equal, round, and reactive to light. Neck:      Musculoskeletal: Normal range of motion and neck supple. Normal range of motion. No neck rigidity, injury, pain with movement, spinous process tenderness or muscular tenderness. Trachea: Trachea and phonation normal. No tracheal tenderness or tracheal deviation. Cardiovascular:      Rate and Rhythm: Regular rhythm. Tachycardia present. Heart sounds: Normal heart sounds. No murmur. Pulmonary:      Effort: Pulmonary effort is normal. No respiratory distress. Breath sounds: Normal breath sounds. No stridor, decreased air movement or transmitted upper airway sounds. No decreased breath sounds, wheezing, rhonchi or rales. Chest:      Chest wall: No tenderness. Abdominal:      General: Bowel sounds are normal. There is no distension. Palpations: Abdomen is soft. Tenderness: There is no abdominal tenderness. There is no right CVA tenderness, left CVA tenderness, guarding or rebound. Musculoskeletal:         General: No swelling, tenderness, deformity or signs of injury. Right lower leg: No edema. Left lower leg: No edema. Comments: Arms and legs are neurovascular intact distally in all 4 extremities. She has no pretibial edema or calf pain. Left arm is neurovascular intact. She has no appreciable swelling, no erythema. No tenderness on exam.  No tender cords. Lymphadenopathy:      Cervical: No cervical adenopathy. Skin:     General: Skin is warm and dry. Coloration: Skin is not jaundiced or pale. Findings: No erythema or rash. Neurological:      General: No focal deficit present. Mental Status: She is alert and oriented to person, place, and time.       GCS: GCS eye 0.8 0.5 - 1.0 mg/dL    GFR Non-African American >60 >=60 mL/min/1.73    GFR African American >60     Calcium 9.6 8.6 - 10.2 mg/dL   CBC Auto Differential   Result Value Ref Range    WBC 3.0 (L) 4.5 - 11.5 E9/L    RBC 3.86 3.50 - 5.50 E12/L    Hemoglobin 12.1 11.5 - 15.5 g/dL    Hematocrit 34.7 34.0 - 48.0 %    MCV 89.9 80.0 - 99.9 fL    MCH 31.3 26.0 - 35.0 pg    MCHC 34.9 (H) 32.0 - 34.5 %    RDW 12.5 11.5 - 15.0 fL    Platelets 335 610 - 322 E9/L    MPV 10.9 7.0 - 12.0 fL    Neutrophils % 48.7 43.0 - 80.0 %    Lymphocytes % 46.1 (H) 20.0 - 42.0 %    Monocytes % 3.5 2.0 - 12.0 %    Eosinophils % 1.7 0.0 - 6.0 %    Basophils % 2.0 0.0 - 2.0 %    Neutrophils Absolute 1.47 (L) 1.80 - 7.30 E9/L    Lymphocytes Absolute 1.38 (L) 1.50 - 4.00 E9/L    Monocytes Absolute 0.12 0.10 - 0.95 E9/L    Eosinophils Absolute 0.05 0.05 - 0.50 E9/L    Basophils Absolute 0.00 0.00 - 0.20 E9/L    RBC Morphology Normal    POC Pregnancy Urine Qual   Result Value Ref Range    HCG, Urine, POC Negative Negative    Lot Number [id number]     Positive QC Pass/Fail Pass     Negative QC Pass/Fail Pass    EKG 12 Lead   Result Value Ref Range    Ventricular Rate 90 BPM    Atrial Rate 90 BPM    P-R Interval 134 ms    QRS Duration 84 ms    Q-T Interval 350 ms    QTc Calculation (Bazett) 428 ms    P Axis 44 degrees    R Axis 29 degrees    T Axis 43 degrees       Radiology:  XR CHEST (2 VW)   Final Result   No acute disease.             ------------------------- NURSING NOTES AND VITALS REVIEWED ---------------------------  Date / Time Roomed:  9/7/2020  9:07 PM  ED Bed Assignment:  12/12    The nursing notes within the ED encounter and vital signs as below have been reviewed.    /72   Pulse 92   Temp 97.4 °F (36.3 °C) (Temporal)   Resp 16   LMP 08/23/2020   SpO2 100%   Oxygen Saturation Interpretation: Normal      ------------------------------------------ PROGRESS NOTES ------------------------------------------  I have spoken with the

## 2020-09-08 NOTE — CARE COORDINATION
Date/Time:  9/8/2020 3:11 PM  Attempted to reach patient by telephone. Left HIPPA compliant message requesting a return call. Will attempt to reach patient again.

## 2020-09-09 NOTE — CARE COORDINATION
Date/Time:  9/9/2020 12:03 PM  Attempted to reach patient by telephone. Left HIPPA compliant message requesting a return call. Unable to complete initial call, will resolve episode.

## 2020-10-14 ENCOUNTER — TELEPHONE (OUTPATIENT)
Dept: ADMINISTRATIVE | Age: 20
End: 2020-10-14

## 2020-10-14 ENCOUNTER — HOSPITAL ENCOUNTER (EMERGENCY)
Age: 20
Discharge: HOME | End: 2020-10-14
Attending: EMERGENCY MEDICINE
Payer: COMMERCIAL

## 2020-10-14 VITALS
BODY MASS INDEX: 27.14 KG/M2 | TEMPERATURE: 98.7 F | RESPIRATION RATE: 18 BRPM | HEIGHT: 64 IN | SYSTOLIC BLOOD PRESSURE: 116 MMHG | OXYGEN SATURATION: 98 % | HEART RATE: 92 BPM | DIASTOLIC BLOOD PRESSURE: 64 MMHG | WEIGHT: 159 LBS

## 2020-10-14 LAB
ALBUMIN SERPL-MCNC: 4.4 G/DL (ref 3.5–5.2)
ALP BLD-CCNC: 56 U/L (ref 35–104)
ALT SERPL-CCNC: 12 U/L (ref 0–32)
ANION GAP SERPL CALCULATED.3IONS-SCNC: 10 MMOL/L (ref 7–16)
AST SERPL-CCNC: 18 U/L (ref 0–31)
BACTERIA: ABNORMAL /HPF
BASOPHILS ABSOLUTE: 0.05 E9/L (ref 0–0.2)
BASOPHILS RELATIVE PERCENT: 0.6 % (ref 0–2)
BILIRUB SERPL-MCNC: 0.4 MG/DL (ref 0–1.2)
BILIRUBIN URINE: NEGATIVE
BLOOD, URINE: NEGATIVE
BUN BLDV-MCNC: 13 MG/DL (ref 6–20)
CALCIUM SERPL-MCNC: 10 MG/DL (ref 8.6–10.2)
CHLORIDE BLD-SCNC: 105 MMOL/L (ref 98–107)
CLARITY: CLEAR
CO2: 26 MMOL/L (ref 22–29)
COLOR: YELLOW
CREAT SERPL-MCNC: 0.7 MG/DL (ref 0.5–1)
EOSINOPHILS ABSOLUTE: 0.04 E9/L (ref 0.05–0.5)
EOSINOPHILS RELATIVE PERCENT: 0.4 % (ref 0–6)
GFR AFRICAN AMERICAN: >60
GFR NON-AFRICAN AMERICAN: >60 ML/MIN/1.73
GLUCOSE BLD-MCNC: 95 MG/DL (ref 74–99)
GLUCOSE URINE: NEGATIVE MG/DL
HCG(URINE) PREGNANCY TEST: NEGATIVE
HCT VFR BLD CALC: 33.5 % (ref 34–48)
HEMOGLOBIN: 11.6 G/DL (ref 11.5–15.5)
IMMATURE GRANULOCYTES #: 0.03 E9/L
IMMATURE GRANULOCYTES %: 0.3 % (ref 0–5)
KETONES, URINE: NEGATIVE MG/DL
LEUKOCYTE ESTERASE, URINE: ABNORMAL
LYMPHOCYTES ABSOLUTE: 1.9 E9/L (ref 1.5–4)
LYMPHOCYTES RELATIVE PERCENT: 21.3 % (ref 20–42)
MCH RBC QN AUTO: 31.5 PG (ref 26–35)
MCHC RBC AUTO-ENTMCNC: 34.6 % (ref 32–34.5)
MCV RBC AUTO: 91 FL (ref 80–99.9)
MONOCYTES ABSOLUTE: 0.55 E9/L (ref 0.1–0.95)
MONOCYTES RELATIVE PERCENT: 6.2 % (ref 2–12)
NEUTROPHILS ABSOLUTE: 6.36 E9/L (ref 1.8–7.3)
NEUTROPHILS RELATIVE PERCENT: 71.2 % (ref 43–80)
NITRITE, URINE: NEGATIVE
PDW BLD-RTO: 12.4 FL (ref 11.5–15)
PH UA: 6.5 (ref 5–9)
PLATELET # BLD: 243 E9/L (ref 130–450)
PMV BLD AUTO: 11.3 FL (ref 7–12)
POTASSIUM REFLEX MAGNESIUM: 3.7 MMOL/L (ref 3.5–5)
PROTEIN UA: NEGATIVE MG/DL
RBC # BLD: 3.68 E12/L (ref 3.5–5.5)
RBC UA: ABNORMAL /HPF (ref 0–2)
SODIUM BLD-SCNC: 141 MMOL/L (ref 132–146)
SPECIFIC GRAVITY UA: 1.02 (ref 1–1.03)
TOTAL PROTEIN: 7.3 G/DL (ref 6.4–8.3)
UROBILINOGEN, URINE: 1 E.U./DL
WBC # BLD: 8.9 E9/L (ref 4.5–11.5)
WBC UA: ABNORMAL /HPF (ref 0–5)

## 2020-10-14 PROCEDURE — 99283 EMERGENCY DEPT VISIT LOW MDM: CPT

## 2020-10-14 PROCEDURE — G0480 DRUG TEST DEF 1-7 CLASSES: HCPCS

## 2020-10-14 PROCEDURE — 84443 ASSAY THYROID STIM HORMONE: CPT

## 2020-10-14 PROCEDURE — 87088 URINE BACTERIA CULTURE: CPT

## 2020-10-14 PROCEDURE — 80307 DRUG TEST PRSMV CHEM ANLYZR: CPT

## 2020-10-14 PROCEDURE — 6360000002 HC RX W HCPCS: Performed by: EMERGENCY MEDICINE

## 2020-10-14 PROCEDURE — 36415 COLL VENOUS BLD VENIPUNCTURE: CPT

## 2020-10-14 PROCEDURE — 81001 URINALYSIS AUTO W/SCOPE: CPT

## 2020-10-14 PROCEDURE — 85025 COMPLETE CBC W/AUTO DIFF WBC: CPT

## 2020-10-14 PROCEDURE — 81025 URINE PREGNANCY TEST: CPT

## 2020-10-14 PROCEDURE — 96374 THER/PROPH/DIAG INJ IV PUSH: CPT

## 2020-10-14 PROCEDURE — 80053 COMPREHEN METABOLIC PANEL: CPT

## 2020-10-14 RX ORDER — DIPHENHYDRAMINE HYDROCHLORIDE 50 MG/ML
25 INJECTION INTRAMUSCULAR; INTRAVENOUS ONCE
Status: COMPLETED | OUTPATIENT
Start: 2020-10-14 | End: 2020-10-14

## 2020-10-14 RX ADMIN — DIPHENHYDRAMINE HYDROCHLORIDE 25 MG: 50 INJECTION, SOLUTION INTRAMUSCULAR; INTRAVENOUS at 17:49

## 2020-10-14 ASSESSMENT — ENCOUNTER SYMPTOMS
BACK PAIN: 0
ABDOMINAL PAIN: 0
SHORTNESS OF BREATH: 0
COUGH: 0

## 2020-10-14 ASSESSMENT — PAIN DESCRIPTION - LOCATION: LOCATION: HAND;ARM

## 2020-10-14 ASSESSMENT — PAIN DESCRIPTION - DESCRIPTORS: DESCRIPTORS: DISCOMFORT

## 2020-10-14 ASSESSMENT — PAIN DESCRIPTION - ORIENTATION: ORIENTATION: RIGHT;LEFT

## 2020-10-14 ASSESSMENT — PAIN SCALES - GENERAL: PAINLEVEL_OUTOF10: 10

## 2020-10-14 ASSESSMENT — PAIN DESCRIPTION - PAIN TYPE: TYPE: ACUTE PAIN

## 2020-10-14 NOTE — ED PROVIDER NOTES
This is a 74-year-old female past medical history of hallucinations ADHD and anxiety presents to the ED for evaluation of hand clapping. Patient states that today she started having lapping of her hands as well clapping to her face. Patient states that sometimes she has shaking her hands and often cannot control this and even can control today however she states that sometimes she does not think about it she hits her hands together as well as her face. Patient states that she had her first taste of alcohol 2 weeks ago however has not drank anything since then. Patient has any drug use or any new changes in her medication. Patient states she is on propanolol as well as Concerta denies any other medications or over-the-counter medications. Patient has any pain headache vision changes or any shaking in the legs. She denies any tobacco use. Denies any suicidal or homicidal ideation. The history is provided by the patient. No  was used. Review of Systems   Constitutional: Negative for fever. HENT: Negative for congestion. Eyes: Negative for visual disturbance. Respiratory: Negative for cough and shortness of breath. Cardiovascular: Negative for chest pain. Gastrointestinal: Negative for abdominal pain. Endocrine: Negative for polyuria. Genitourinary: Negative for dysuria. Musculoskeletal: Negative for back pain and neck pain. Skin: Negative for rash. Allergic/Immunologic: Negative for immunocompromised state. Neurological: Negative for headaches. Hematological: Does not bruise/bleed easily. Psychiatric/Behavioral: Negative for self-injury. The patient is nervous/anxious. Physical Exam  Vitals signs and nursing note reviewed. Constitutional:       General: She is not in acute distress. Appearance: She is well-developed. She is not ill-appearing. HENT:      Head: Normocephalic and atraumatic.       Mouth/Throat:      Mouth: Mucous membranes are moist.   Eyes:      Extraocular Movements: Extraocular movements intact. Pupils: Pupils are equal, round, and reactive to light. Neck:      Musculoskeletal: Normal range of motion and neck supple. Vascular: No JVD. Cardiovascular:      Rate and Rhythm: Normal rate and regular rhythm. Heart sounds: No murmur. Pulmonary:      Effort: Pulmonary effort is normal.      Breath sounds: No wheezing, rhonchi or rales. Chest:      Chest wall: No tenderness. Abdominal:      General: There is no distension. Palpations: Abdomen is soft. Tenderness: There is no abdominal tenderness. There is no guarding or rebound. Hernia: No hernia is present. Musculoskeletal:      Right lower leg: No edema. Left lower leg: No edema. Skin:     General: Skin is warm and dry. Capillary Refill: Capillary refill takes less than 2 seconds. Neurological:      General: No focal deficit present. Mental Status: She is alert and oriented to person, place, and time. Cranial Nerves: No cranial nerve deficit. Comments: Movement of hand making clapping motion with intermittent episodes of patient slapping hand to left arm. When asked to stop, patient able to control movement   Psychiatric:         Mood and Affect: Mood normal.         Behavior: Behavior normal.         Procedures    MDM  Number of Diagnoses or Management Options  Spasms of the hands or feet:   Diagnosis management comments: Is a 79-year-old female with a past medical history of anxiety depression headaches who presented to the ED for evaluation of a hand slapping sensation that started today. In the department the patient was nontoxic was able to answer questions appropriately and follow commands. Initially entered the room the patient was in no distress however then started taking her hands and clapping them together. Patient was then taken him and slapped her face.   When asked to stop the patient was able to control this. Patient had no numbness or tingling whatsoever. She denied any suicidal or homicidal ideation. Patient was treated with Benadryl which did resolve her symptoms. Patient's work-up was unremarkable did not like she was found to have some bacteria in her urine culture was sent off as the patient was asymptomatic. Not sure the exact etiology of the patient's symptoms may be medication versus neuro/psychiatric in nature. Patient was given 2 days off of work to follow-up with her doctor tomorrow for further evaluation and possible education changes. Patient was able to ambulate in the department that difficulty.             --------------------------------------------- PAST HISTORY ---------------------------------------------  Past Medical History:  has a past medical history of Anxiety and depression, Chronic daily headache, Constipation, Constipation, chronic, Depression, Hallucination, Muscle contraction headache, Periodic headache syndrome, and Vitamin D deficiency. Past Surgical History:  has a past surgical history that includes Myringotomy Tympanostomy Tube Placement and fracture surgery (july 2014). Social History:  reports that she has never smoked. She has never used smokeless tobacco. She reports that she does not drink alcohol or use drugs. Family History: family history includes Migraines in her mother. The patients home medications have been reviewed.     Allergies: Topiramate    -------------------------------------------------- RESULTS -------------------------------------------------  Labs:  Results for orders placed or performed during the hospital encounter of 10/14/20   Comprehensive Metabolic Panel w/ Reflex to MG   Result Value Ref Range    Sodium 141 132 - 146 mmol/L    Potassium reflex Magnesium 3.7 3.5 - 5.0 mmol/L    Chloride 105 98 - 107 mmol/L    CO2 26 22 - 29 mmol/L    Anion Gap 10 7 - 16 mmol/L    Glucose 95 74 - 99 mg/dL    BUN 13 6 - 20 mg/dL CREATININE 0.7 0.5 - 1.0 mg/dL    GFR Non-African American >60 >=60 mL/min/1.73    GFR African American >60     Calcium 10.0 8.6 - 10.2 mg/dL    Total Protein 7.3 6.4 - 8.3 g/dL    Alb 4.4 3.5 - 5.2 g/dL    Total Bilirubin 0.4 0.0 - 1.2 mg/dL    Alkaline Phosphatase 56 35 - 104 U/L    ALT 12 0 - 32 U/L    AST 18 0 - 31 U/L   CBC Auto Differential   Result Value Ref Range    WBC 8.9 4.5 - 11.5 E9/L    RBC 3.68 3.50 - 5.50 E12/L    Hemoglobin 11.6 11.5 - 15.5 g/dL    Hematocrit 33.5 (L) 34.0 - 48.0 %    MCV 91.0 80.0 - 99.9 fL    MCH 31.5 26.0 - 35.0 pg    MCHC 34.6 (H) 32.0 - 34.5 %    RDW 12.4 11.5 - 15.0 fL    Platelets 095 837 - 520 E9/L    MPV 11.3 7.0 - 12.0 fL    Neutrophils % 71.2 43.0 - 80.0 %    Immature Granulocytes % 0.3 0.0 - 5.0 %    Lymphocytes % 21.3 20.0 - 42.0 %    Monocytes % 6.2 2.0 - 12.0 %    Eosinophils % 0.4 0.0 - 6.0 %    Basophils % 0.6 0.0 - 2.0 %    Neutrophils Absolute 6.36 1.80 - 7.30 E9/L    Immature Granulocytes # 0.03 E9/L    Lymphocytes Absolute 1.90 1.50 - 4.00 E9/L    Monocytes Absolute 0.55 0.10 - 0.95 E9/L    Eosinophils Absolute 0.04 (L) 0.05 - 0.50 E9/L    Basophils Absolute 0.05 0.00 - 0.20 E9/L   Serum Drug Screen   Result Value Ref Range    Ethanol Lvl <10 mg/dL    Acetaminophen Level <5.0 (L) 10.0 - 01.9 mcg/mL    Salicylate, Serum <6.4 0.0 - 30.0 mg/dL   Urinalysis with Microscopic   Result Value Ref Range    Color, UA Yellow Straw/Yellow    Clarity, UA Clear Clear    Glucose, Ur Negative Negative mg/dL    Bilirubin Urine Negative Negative    Ketones, Urine Negative Negative mg/dL    Specific Gravity, UA 1.025 1.005 - 1.030    Blood, Urine Negative Negative    pH, UA 6.5 5.0 - 9.0    Protein, UA Negative Negative mg/dL    Urobilinogen, Urine 1.0 <2.0 E.U./dL    Nitrite, Urine Negative Negative    Leukocyte Esterase, Urine SMALL (A) Negative    WBC, UA 1-3 0 - 5 /HPF    RBC, UA NONE 0 - 2 /HPF    Bacteria, UA RARE (A) None Seen /HPF   Pregnancy, Urine   Result Value Ref Range    HCG(Urine) Pregnancy Test NEGATIVE NEGATIVE       Radiology:  No orders to display       ------------------------- NURSING NOTES AND VITALS REVIEWED ---------------------------  Date / Time Roomed:  10/14/2020  5:14 PM  ED Bed Assignment:  11/11    The nursing notes within the ED encounter and vital signs as below have been reviewed. /64   Pulse 92   Temp 98.7 °F (37.1 °C) (Temporal)   Resp 18   Ht 5' 4\" (1.626 m)   Wt 159 lb (72.1 kg)   LMP 09/29/2020   SpO2 98%   BMI 27.29 kg/m²   Oxygen Saturation Interpretation: Normal      ------------------------------------------ PROGRESS NOTES ------------------------------------------  5:46 PM EDT  I have spoken with the patient and discussed todays results, in addition to providing specific details for the plan of care and counseling regarding the diagnosis and prognosis. Their questions are answered at this time and they are agreeable with the plan. I discussed at length with them reasons for immediate return here for re evaluation. They will followup with their PCP.      --------------------------------- ADDITIONAL PROVIDER NOTES ---------------------------------  At this time the patient is without objective evidence of an acute process requiring hospitalization or inpatient management. They have remained hemodynamically stable throughout their entire ED visit and are stable for discharge with outpatient follow-up. The plan has been discussed in detail and they are aware of the specific conditions for emergent return, as well as the importance of follow-up. Discharge Medication List as of 10/14/2020  7:42 PM          Diagnosis:  1. Spasms of the hands or feet        Disposition:  Patient's disposition: Discharge to home  Patient's condition is stable.           Jose Angel Flowers DO  10/14/20 2003

## 2020-10-14 NOTE — TELEPHONE ENCOUNTER
Pt's mother Bernardo Coughlin called stating Pt wants to establish care as a NP with [facility] . Pt's mother states Pt works and won't be able to answer call so please call mother Bernardo Coughlin at [phone].  Please advise

## 2020-10-14 NOTE — TELEPHONE ENCOUNTER
Pt's mother called in because no one called her. I advised her the message was just sent over. She stated the pt has pain in her hand. I advised the pt's mother, I'm not sure how soon she would be able to get an appt because she is not yet est with [facility]. Hima Innocent to give the office some more time, with it being late in the day, she may not receive a call until tomorrow. Henrique Norris understood.

## 2020-10-15 LAB
ACETAMINOPHEN LEVEL: <5 MCG/ML (ref 10–30)
ETHANOL: <10 MG/DL (ref 0–0.08)
SALICYLATE, SERUM: <0.3 MG/DL (ref 0–30)
TRICYCLIC ANTIDEPRESSANTS SCREEN SERUM: NEGATIVE NG/ML
TSH SERPL DL<=0.05 MIU/L-ACNC: 2.44 UIU/ML (ref 0.27–4.2)

## 2020-10-15 NOTE — ED NOTES
Instructions provided and questions answered. Iv disconnected, site wnl.        Evette Underwood RN  10/14/20 2002

## 2020-10-17 ENCOUNTER — APPOINTMENT (OUTPATIENT)
Dept: CT IMAGING | Age: 20
End: 2020-10-17
Payer: COMMERCIAL

## 2020-10-17 ENCOUNTER — HOSPITAL ENCOUNTER (EMERGENCY)
Age: 20
Discharge: HOME | End: 2020-10-17
Payer: COMMERCIAL

## 2020-10-17 VITALS
SYSTOLIC BLOOD PRESSURE: 115 MMHG | TEMPERATURE: 97.8 F | HEART RATE: 91 BPM | DIASTOLIC BLOOD PRESSURE: 74 MMHG | OXYGEN SATURATION: 98 % | RESPIRATION RATE: 20 BRPM

## 2020-10-17 LAB
ANION GAP SERPL CALCULATED.3IONS-SCNC: 9 MMOL/L (ref 7–16)
BASOPHILS ABSOLUTE: 0.06 E9/L (ref 0–0.2)
BASOPHILS RELATIVE PERCENT: 0.8 % (ref 0–2)
BUN BLDV-MCNC: 11 MG/DL (ref 6–20)
CALCIUM SERPL-MCNC: 9.4 MG/DL (ref 8.6–10.2)
CHLORIDE BLD-SCNC: 105 MMOL/L (ref 98–107)
CO2: 26 MMOL/L (ref 22–29)
CREAT SERPL-MCNC: 0.8 MG/DL (ref 0.5–1)
EOSINOPHILS ABSOLUTE: 0.17 E9/L (ref 0.05–0.5)
EOSINOPHILS RELATIVE PERCENT: 2.2 % (ref 0–6)
GFR AFRICAN AMERICAN: >60
GFR NON-AFRICAN AMERICAN: >60 ML/MIN/1.73
GLUCOSE BLD-MCNC: 91 MG/DL (ref 74–99)
HCT VFR BLD CALC: 38.9 % (ref 34–48)
HEMOGLOBIN: 12.7 G/DL (ref 11.5–15.5)
IMMATURE GRANULOCYTES #: 0.02 E9/L
IMMATURE GRANULOCYTES %: 0.3 % (ref 0–5)
LYMPHOCYTES ABSOLUTE: 2.43 E9/L (ref 1.5–4)
LYMPHOCYTES RELATIVE PERCENT: 31.7 % (ref 20–42)
MCH RBC QN AUTO: 30.6 PG (ref 26–35)
MCHC RBC AUTO-ENTMCNC: 32.6 % (ref 32–34.5)
MCV RBC AUTO: 93.7 FL (ref 80–99.9)
MONOCYTES ABSOLUTE: 0.52 E9/L (ref 0.1–0.95)
MONOCYTES RELATIVE PERCENT: 6.8 % (ref 2–12)
NEUTROPHILS ABSOLUTE: 4.46 E9/L (ref 1.8–7.3)
NEUTROPHILS RELATIVE PERCENT: 58.2 % (ref 43–80)
PDW BLD-RTO: 12.5 FL (ref 11.5–15)
PLATELET # BLD: 257 E9/L (ref 130–450)
PMV BLD AUTO: 11.1 FL (ref 7–12)
POTASSIUM REFLEX MAGNESIUM: 3.7 MMOL/L (ref 3.5–5)
RBC # BLD: 4.15 E12/L (ref 3.5–5.5)
SODIUM BLD-SCNC: 140 MMOL/L (ref 132–146)
URINE CULTURE, ROUTINE: NORMAL
WBC # BLD: 7.7 E9/L (ref 4.5–11.5)

## 2020-10-17 PROCEDURE — 6360000002 HC RX W HCPCS: Performed by: PHYSICIAN ASSISTANT

## 2020-10-17 PROCEDURE — 99283 EMERGENCY DEPT VISIT LOW MDM: CPT

## 2020-10-17 PROCEDURE — 85025 COMPLETE CBC W/AUTO DIFF WBC: CPT

## 2020-10-17 PROCEDURE — 96372 THER/PROPH/DIAG INJ SC/IM: CPT

## 2020-10-17 PROCEDURE — 70450 CT HEAD/BRAIN W/O DYE: CPT

## 2020-10-17 PROCEDURE — 6370000000 HC RX 637 (ALT 250 FOR IP): Performed by: PHYSICIAN ASSISTANT

## 2020-10-17 PROCEDURE — 99282 EMERGENCY DEPT VISIT SF MDM: CPT

## 2020-10-17 PROCEDURE — 80048 BASIC METABOLIC PNL TOTAL CA: CPT

## 2020-10-17 RX ORDER — HYDROXYZINE PAMOATE 50 MG/1
50 CAPSULE ORAL 3 TIMES DAILY PRN
Qty: 30 CAPSULE | Refills: 0 | Status: SHIPPED | OUTPATIENT
Start: 2020-10-17 | End: 2020-10-27

## 2020-10-17 RX ORDER — LORAZEPAM 1 MG/1
1 TABLET ORAL EVERY 6 HOURS PRN
Qty: 12 TABLET | Refills: 0 | Status: SHIPPED | OUTPATIENT
Start: 2020-10-17 | End: 2020-10-20

## 2020-10-17 RX ORDER — LORAZEPAM 1 MG/1
1 TABLET ORAL ONCE
Status: COMPLETED | OUTPATIENT
Start: 2020-10-17 | End: 2020-10-17

## 2020-10-17 RX ORDER — LORAZEPAM 2 MG/ML
1 INJECTION INTRAMUSCULAR ONCE
Status: COMPLETED | OUTPATIENT
Start: 2020-10-17 | End: 2020-10-17

## 2020-10-17 RX ADMIN — LORAZEPAM 1 MG: 2 INJECTION INTRAMUSCULAR; INTRAVENOUS at 17:05

## 2020-10-17 RX ADMIN — LORAZEPAM 1 MG: 1 TABLET ORAL at 18:04

## 2020-10-17 NOTE — ED NOTES
FIRST PROVIDER CONTACT ASSESSMENT NOTE      Department of Emergency Medicine   ED  First Provider Note   10/17/20  2:51 PM EDT    Chief Complaint: Other (involuntary twitching started on wednesday and involuntary noises starting this morning)      History of Present Illness:    Sharona Yanez is a 23 y.o. female who presents to the ED by private car for greatly increased twitching from her baseline to the point of hitting herself on the head and collarbone. States she does have a history of twitching but has been under control for multiple years. States she is on 2 different medications for depression and anxiety that she has been on for many years. Denies recent illness. Focused Screening Exam:  Constitutional:  Alert, appears stated age and is in no distress.       *ALLERGIES*     Topiramate     ED Triage Vitals   BP Temp Temp src Pulse Resp SpO2 Height Weight   10/17/20 1450 10/17/20 1444 -- 10/17/20 1444 10/17/20 1444 10/17/20 1444 -- --   (!) 107/93 98.3 °F (36.8 °C)  111 26 96 %          Initial Plan of Care:  Initiate Treatment-Testing, Proceed toTreatment Area When Bed Available for ED Attending/MLP to Continue Care    -----------------END OF FIRST PROVIDER CONTACT ASSESSMENT NOTE--------------  Electronically signed by TRISH Fernandez CNP   DD: 10/17/20       TRISH Pearson CNP  10/17/20 1455

## 2020-10-17 NOTE — ED PROVIDER NOTES
[facility]       Department of Emergency Medicine   ED  Provider Note  Admit Date/RoomTime: 10/17/2020  3:26 PM  ED Room: [address] [place] [address]  MRN: [id number]  Chief Complaint: Other (involuntary twitching started on wednesday and involuntary noises starting this morning)       History of Present Illness   Source of history provided by:  patient and parent. History/Exam Limitations: none. Jurgen Michelle is a 23 y.o. female who has a past medical history of:   Past Medical History:   Diagnosis Date    Anxiety and depression 4/26/2019    Chronic daily headache 4/26/2019    Constipation     hx of    Constipation, chronic     Depression     Hallucination     Muscle contraction headache 6/15/2020    Periodic headache syndrome 4/26/2019    Vitamin D deficiency 6/17/2019    presents to the ED by private car and is accompanied by mother for evaluation of involuntary spasms and muscle twitches to her torso and upper extremities, beginning several days ago and are unchanged since that time. The complaint has been persistent, severe in severity. She was seen about a month ago at [facility] for a muscle spasm and twitching that she had on the right side which according to family is different from today's presentation. Mother notes that on Wednesday sometime she began having involuntary spasms/shrugging of the shoulders and flexing of the upper arms which went from occasionally to intractable. It is then led to her making additional stridorous-like noises with each spasm. Patient denies any history of trauma, any new or illicit drug use, any fever or obvious stressors in her life. She is a college student who is working as a  at [address].    ROS    Pertinent positives and negatives are stated within HPI, all other systems reviewed and are negative.     Past Surgical History:   Procedure Laterality Date    FRACTURE SURGERY  july 2014    right ankle    MYRINGOTOMY AND TYMPANOSTOMY TUBE PLACEMENT     Social History:  reports that she has never smoked. She has never used smokeless tobacco. She reports that she does not drink alcohol or use drugs. Family History: family history includes Migraines in her mother. Allergies: Topiramate    Physical Exam   Oxygen Saturation Interpretation: Normal.   ED Triage Vitals   BP Temp Temp src Pulse Resp SpO2 Height Weight   10/17/20 1450 10/17/20 1444 -- 10/17/20 1444 10/17/20 1444 10/17/20 1444 -- --   (!) 107/93 98.3 °F (36.8 °C)  111 26 96 %         Physical Exam  · Constitutional/General: Alert and oriented x3, well appearing, non toxic, having frequent intractable muscle spasms and shaking episodes of the upper extremities and torso  · HEENT:  NC/NT. PERRLA,  Airway patent. · Neck: Supple, full ROM, non tender to palpation in the midline, no stridor, no crepitus, no meningeal signs  · Respiratory: Lungs clear to auscultation bilaterally, no wheezes, rales, or rhonchi. Not in respiratory distress  · CV: Tacky but regular rate. Regular rhythm. No murmurs, gallops, or rubs. 2+ distal pulses  · Chest: No chest wall tenderness  · GI:  Abdomen Soft, Non tender, Non distended. +BS. No rebound, guarding, or rigidity. No pulsatile masses. · Musculoskeletal: Moves all extremities x 4. Warm and well perfused, no clubbing, cyanosis, or edema. Capillary refill <3 seconds  · Integument: skin warm and dry. No rashes.    · Lymphatic: no lymphadenopathy noted  · Neurologic: GCS 15, no focal deficits, symmetric strength 5/5 in the upper and lower extremities bilaterally  · Psychiatric: Flat affect    Lab / Imaging Results   (All laboratory and radiology results have been personally reviewed by myself)  Labs:  Results for orders placed or performed during the hospital encounter of 10/17/20   CBC Auto Differential   Result Value Ref Range    WBC 7.7 4.5 - 11.5 E9/L    RBC 4.15 3.50 - 5.50 E12/L    Hemoglobin 12.7 11.5 - 15.5 g/dL    Hematocrit 38.9 34.0 - 48.0 %    MCV

## 2020-10-20 ENCOUNTER — VIRTUAL VISIT (OUTPATIENT)
Dept: NEUROLOGY | Age: 20
End: 2020-10-20
Payer: COMMERCIAL

## 2020-10-20 PROBLEM — R45.1 MOTOR RESTLESSNESS: Chronic | Status: ACTIVE | Noted: 2020-10-20

## 2020-10-20 PROBLEM — G25.71 AKATHISIA: Status: ACTIVE | Noted: 2020-10-20

## 2020-10-20 PROBLEM — F41.1 NEUROSIS, ANXIETY, GENERALIZED: Chronic | Status: ACTIVE | Noted: 2020-10-20

## 2020-10-20 PROCEDURE — 1036F TOBACCO NON-USER: CPT | Performed by: PSYCHIATRY & NEUROLOGY

## 2020-10-20 PROCEDURE — G8419 CALC BMI OUT NRM PARAM NOF/U: HCPCS | Performed by: PSYCHIATRY & NEUROLOGY

## 2020-10-20 PROCEDURE — 99213 OFFICE O/P EST LOW 20 MIN: CPT | Performed by: PSYCHIATRY & NEUROLOGY

## 2020-10-20 PROCEDURE — G8427 DOCREV CUR MEDS BY ELIG CLIN: HCPCS | Performed by: PSYCHIATRY & NEUROLOGY

## 2020-10-20 PROCEDURE — G8484 FLU IMMUNIZE NO ADMIN: HCPCS | Performed by: PSYCHIATRY & NEUROLOGY

## 2020-10-20 RX ORDER — LEVONORGESTREL AND ETHINYL ESTRADIOL 0.15-0.03
KIT ORAL
COMMUNITY
Start: 2020-09-24 | End: 2022-02-25 | Stop reason: SDUPTHER

## 2020-10-20 RX ORDER — QUETIAPINE 200 MG/1
400 TABLET, FILM COATED, EXTENDED RELEASE ORAL NIGHTLY
COMMUNITY
Start: 2020-10-07 | End: 2022-07-07

## 2020-10-20 NOTE — PROGRESS NOTES
better. Appropriate analgesia with current medications regimen: yes propranolol LA 80 mg daily. Change in quality of symptoms:no. Medication side effects:not applicable She is tolerating the propranolol LA 80 mg daily, i and previously was taking 60 mg daily for headache prophylaxis. Lab data: Reviewed from 10/17, 10/14/2020. Past Medical History: Reviewed    Past Surgical History: Reviewed     Please refer to prior Neurology consult progress note of 6/15/2020 for additional information if needed. ROS, family/social history, medication/allergy list: Reviewed, updated. Home Medications: Reviewed  Current Outpatient Medications   Medication Sig Dispense Refill    QUEtiapine (SEROQUEL XR) 200 MG extended release tablet Take 200 mg by mouth nightly      levonorgestrel-ethinyl estradiol (NORDETTE) 0.15-30 MG-MCG per tablet TAKE ONE TABLET BY MOUTH EVERY DAY      hydrOXYzine (VISTARIL) 50 MG capsule Take 1 capsule by mouth 3 times daily as needed for Itching 30 capsule 0    LORazepam (ATIVAN) 1 MG tablet Take 1 tablet by mouth every 6 hours as needed for Anxiety for up to 3 days. 12 tablet 0    propranolol (INDERAL LA) 80 MG extended release capsule Take one daily, migraine 30 capsule 5    Methylphenidate (CONCERTA) 54 MG CR tablet Take 54 mg by mouth every morning. No current facility-administered medications for this visit. Allergies: Reviewed     Social History: Reviewed     REVIEW OF SYSTEMS:     Autumn denies fever/chills, chest pain, shortness of breath, new bowel or bladder complaints. All other review of systems was negative. Reports migraine headaches are improved on propranolol LA 80 mg daily with decreased headache frequency and intensity. VIRTUAL NEUROLOGIC EXAMINATION:      General:    General appearance: Motor restlessness, appears fidgety, anxious, no tremors or dyskinesias observed. Build:Normal Weight  A & O x3. Speech is clear and language grossly fluent.   No paraphasic word errors or dysarthria noted. CN II-XII: Remains grossly intact throughout. Pupils are equal and reactive to light. EOMs are grossly full without nystagmus. Visual fields are grossly full. Facial expression is decreased and facial sensation is normal.  Hearing is grossly intact. The tongue is midline. Motor/Sensory Exam: Grossly intact strength in the upper and lower extremities without tremor or drift. No apparent weakness per patient. No focal sensory or numbness complaints. Coordination: No limb dysmetria or gait imbalance. HEENT:    Head:normocephalic and atraumatic  Pupils:normal, regular, round and equal.  Sclera: icterus absent    Lungs:    Breathing:Normal expansion. Clear to auscultation. No rales, rhonchi, or wheezing. Musculoskeletal:    SLR:not done right, not done left, sitting   No apparent tremors. + Generalized motor restlessness. Extremities:    Tremors:None bilaterally upper and lower  Range of motion:Generally normal     Dermatology:    Skin:no skin lesions    Impression/Plan:  1. Variant migraine headache pain syndrome treated with propranolol LA 80 mg daily with clinical improvement. She will follow-up in 8 months in the Neurology clinic for migraines. 2. Generalized anxiety, dysthymia. Motor restlessness or akathisia observed. History of clapping activities most likely related to her underlying psychiatric disorder and/or psychotropic medications. 3. Follow-up with her behavioral health specialist and family physician as needed otherwise. Patient advised regarding steps to help prevent the spread of COVID-19   SOURCE - https://cesar-sarah.info/. html     1-Stay home except to get medical care  2-Clean your hands often for at least 20 seconds, avoid touching: Avoid touching your eyes, nose, and mouth with unwashed hands.   3-Seek medical attention: Seek prompt medical attention if your illness is worsening (e.g., difficulty breathing). Call you doctor first.  3-Wear a facemask if you are sick   4-Cover your coughs and sneezes           I affirm this is a Patient Initiated Episode with an established Patient who has not had a related appointment within my department in the past 7 days or scheduled within the next 24 hours. Note: This visit was completed virtually using DOXY. ME    Total Time: 25 mins.     Patient location: Home  Provider location: Physician office    Cc: Referring physician    Harleen Valdez M.D.

## 2020-10-27 ENCOUNTER — OFFICE VISIT (OUTPATIENT)
Dept: FAMILY MEDICINE CLINIC | Age: 20
End: 2020-10-27
Payer: COMMERCIAL

## 2020-10-27 VITALS
OXYGEN SATURATION: 99 % | HEIGHT: 64 IN | HEART RATE: 95 BPM | RESPIRATION RATE: 14 BRPM | TEMPERATURE: 96.1 F | DIASTOLIC BLOOD PRESSURE: 82 MMHG | SYSTOLIC BLOOD PRESSURE: 130 MMHG | WEIGHT: 159 LBS | BODY MASS INDEX: 27.14 KG/M2

## 2020-10-27 PROCEDURE — 99203 OFFICE O/P NEW LOW 30 MIN: CPT | Performed by: NURSE PRACTITIONER

## 2020-10-27 PROCEDURE — G8427 DOCREV CUR MEDS BY ELIG CLIN: HCPCS | Performed by: NURSE PRACTITIONER

## 2020-10-27 PROCEDURE — G8419 CALC BMI OUT NRM PARAM NOF/U: HCPCS | Performed by: NURSE PRACTITIONER

## 2020-10-27 PROCEDURE — 1036F TOBACCO NON-USER: CPT | Performed by: NURSE PRACTITIONER

## 2020-10-27 PROCEDURE — G8484 FLU IMMUNIZE NO ADMIN: HCPCS | Performed by: NURSE PRACTITIONER

## 2020-10-27 RX ORDER — PROPRANOLOL HYDROCHLORIDE 80 MG/1
CAPSULE, EXTENDED RELEASE ORAL
Qty: 30 CAPSULE | Refills: 5 | Status: SHIPPED
Start: 2020-10-27 | End: 2022-03-09 | Stop reason: ALTCHOICE

## 2020-10-27 NOTE — PROGRESS NOTES
HPI:  The patient is a 23 y.o. female who presents today to establish care. The patient complains of   Chief Complaint   Patient presents with   [facility].     here as a new patient. when asked if she had anything she wants to address, she said she doesn't know. Her grandfather is in room with her and did not express anything to be addressed as well.  Health Maintenance     states she got her flu vaccine at [facility] last month     . Patient was hospitalized at [facility] last week for tics and stress. seroquel dose was increased and ADD medication was stopped. Has follow up with Dr. Karma Wolf in December. She does complain of tiredness. States she does not like to leave the basement. Tics worse at night time. She denies hallucinations. She was also diagnosed with schizophrenia while in [place]. Does not know family history of father's side. Also has history of multiple personality disorder.     Her last PCP was a pediatrician     Past Medical History:   Diagnosis Date    Akathisia 10/20/2020    Anxiety and depression 4/26/2019    Chronic daily headache 4/26/2019    Constipation     hx of    Constipation, chronic     Depression     Hallucination     Motor restlessness 10/20/2020    Muscle contraction headache 6/15/2020    Neurosis, anxiety, generalized 10/20/2020    Periodic headache syndrome 4/26/2019    [facility]     Tourette's     Vitamin D deficiency 6/17/2019      Past Surgical History:   Procedure Laterality Date    ANKLE SURGERY      FRACTURE SURGERY  july 2014    right ankle    MYRINGOTOMY AND TYMPANOSTOMY TUBE PLACEMENT        Family History   Problem Relation Age of Onset    Migraines Mother     Diabetes Maternal Grandmother     High Blood Pressure Maternal Grandmother     Stroke Maternal Grandmother     Diabetes Maternal Grandfather     High Blood Pressure Maternal Grandfather     Kidney Disease Maternal Grandfather      Social History Socioeconomic History    Marital status: Single     Spouse name: Not on file    Number of children: Not on file    Years of education: Not on file    Highest education level: Not on file   Occupational History    Not on file   Social Needs    Financial resource strain: Not on file    Food insecurity     Worry: Not on file     Inability: Not on file    Transportation needs     Medical: Not on file     Non-medical: Not on file   Tobacco Use    Smoking status: Never Smoker    Smokeless tobacco: Never Used   Substance and Sexual Activity    Alcohol use: No    Drug use: No    Sexual activity: Never   Lifestyle    Physical activity     Days per week: Not on file     Minutes per session: Not on file    Stress: Not on file   Relationships    Social connections     Talks on phone: Not on file     Gets together: Not on file     Attends Voodoo service: Not on file     Active member of club or organization: Not on file     Attends meetings of clubs or organizations: Not on file     Relationship status: Not on file    Intimate partner violence     Fear of current or ex partner: Not on file     Emotionally abused: Not on file     Physically abused: Not on file     Forced sexual activity: Not on file   Other Topics Concern    Not on file   Social History Narrative    Not on file      Allergies   Allergen Reactions    Topiramate Other (See Comments)     Worsening of headaches        Prior to Visit Medications    Medication Sig Taking?  Authorizing Provider   propranolol (INDERAL LA) 80 MG extended release capsule Take one daily, migraine Yes Quentin Lee APRN - CNP   QUEtiapine (SEROQUEL XR) 200 MG extended release tablet Take 400 mg by mouth nightly  Yes Historical Provider, MD   levonorgestrel-ethinyl estradiol (NORDETTE) 0.15-30 MG-MCG per tablet TAKE ONE TABLET BY MOUTH EVERY DAY Yes Historical Provider, MD   hydrOXYzine (VISTARIL) 50 MG capsule Take 1 capsule by mouth 3 times daily as needed for unremarkable      Assessment/Plan:  Sharona was seen today for establish care and health maintenance. Diagnoses and all orders for this visit:    Encounter for medical examination to establish care    Migraine without aura and without status migrainosus, not intractable  -     propranolol (INDERAL LA) 80 MG extended release capsule; Take one daily, migraine  -The current medical regimen is effective;  continue present plan and medications. As above. Call or go to ED immediately if symptoms worsen or persist.  No follow-ups on file. , or sooner if necessary. Educational materials and/or home exercises printed for patient's review and were included in patient instructions on his/her After Visit Summary and given to patient at the end of visit. Counseled regarding above diagnosis, including possible risks and complications,  especially if left uncontrolled. Counseled regarding the possible side effects, risks, benefits and alternatives to treatment; patient and/or guardian verbalizes understanding, agrees, feels comfortable with and wishes to proceed with above treatment plan. Advised patient to call with any new medication issues, and read all Rx info from pharmacy to assure aware of all possible risks and side effects of medication before taking. Reviewed age and gender appropriate health screening exams and vaccinations. Advised patient regarding importance of keeping up with recommended health maintenance and to schedule as soon as possible if overdue, as this is important in assessing for undiagnosed pathology, especially cancer, as well as protecting against potentially harmful/life threatening disease. Patient and/or guardian verbalizes understanding and agrees with above counseling, assessment and plan.

## 2020-11-28 ENCOUNTER — APPOINTMENT (OUTPATIENT)
Dept: GENERAL RADIOLOGY | Age: 20
End: 2020-11-28
Payer: COMMERCIAL

## 2020-11-28 ENCOUNTER — HOSPITAL ENCOUNTER (EMERGENCY)
Age: 20
Discharge: HOME | End: 2020-11-28
Payer: COMMERCIAL

## 2020-11-28 VITALS
TEMPERATURE: 98.3 F | DIASTOLIC BLOOD PRESSURE: 62 MMHG | HEIGHT: 61 IN | RESPIRATION RATE: 16 BRPM | OXYGEN SATURATION: 99 % | WEIGHT: 182 LBS | SYSTOLIC BLOOD PRESSURE: 110 MMHG | BODY MASS INDEX: 34.36 KG/M2 | HEART RATE: 90 BPM

## 2020-11-28 PROCEDURE — U0003 INFECTIOUS AGENT DETECTION BY NUCLEIC ACID (DNA OR RNA); SEVERE ACUTE RESPIRATORY SYNDROME CORONAVIRUS 2 (SARS-COV-2) (CORONAVIRUS DISEASE [COVID-19]), AMPLIFIED PROBE TECHNIQUE, MAKING USE OF HIGH THROUGHPUT TECHNOLOGIES AS DESCRIBED BY CMS-2020-01-R: HCPCS

## 2020-11-28 PROCEDURE — 99285 EMERGENCY DEPT VISIT HI MDM: CPT

## 2020-11-28 PROCEDURE — 71045 X-RAY EXAM CHEST 1 VIEW: CPT

## 2020-11-28 RX ORDER — IBUPROFEN 600 MG/1
600 TABLET ORAL EVERY 6 HOURS PRN
Qty: 20 TABLET | Refills: 0 | Status: SHIPPED | OUTPATIENT
Start: 2020-11-28 | End: 2021-03-31 | Stop reason: SDUPTHER

## 2020-11-28 RX ORDER — ONDANSETRON 4 MG/1
4 TABLET, ORALLY DISINTEGRATING ORAL EVERY 8 HOURS PRN
Qty: 10 TABLET | Refills: 0 | Status: SHIPPED | OUTPATIENT
Start: 2020-11-28 | End: 2021-12-27

## 2020-11-28 ASSESSMENT — PAIN DESCRIPTION - DESCRIPTORS: DESCRIPTORS: HEADACHE

## 2020-11-28 ASSESSMENT — PAIN SCALES - GENERAL: PAINLEVEL_OUTOF10: 10

## 2020-11-28 NOTE — ED PROVIDER NOTES
[facility]  Department of Emergency Medicine   ED  Encounter Note  Admit Date/RoomTime: 2020  1:53 PM  ED Room:     NAME: Shayy Beck  : 2000  MRN: [id number]    Chief Complaint: Concern For COVID-19 ([address])       History of Present Illness       Sharona Huerta is a 23 y.o. old female who presents to the emergency department concerned she has coronavirus. Patient states for 1 week now she has had fevers and chills. Headache and dizziness. Sore throat. Does admit that things taste different. She is also had a mild cough, nonproductive. Shortness of breath with exertion, like climbing the stairs to the bathroom. Patient states she has stomach issues all the time so yes at times she is nauseated and has diarrhea, however that is not new. Patient states she was exposed to the coronavirus through a family member. In addition she works at [employer] in [employer]. Nothing makes the symptoms better or worse. Patient has had a flu vaccination this year. ROS   Pertinent positives and negatives are stated within HPI, all other systems reviewed and are negative. Past Medical History:  has a past medical history of Akathisia, Anxiety and depression, Chronic daily headache, Constipation, Constipation, chronic, Depression, Hallucination, Motor restlessness, Muscle contraction headache, Neurosis, anxiety, generalized, Periodic headache syndrome, Schizophrenia ([address] Tourette's, and Vitamin D deficiency. Surgical History:  has a past surgical history that includes Myringotomy Tympanostomy Tube Placement; fracture surgery (2014); and Ankle surgery. Social History:  reports that she has never smoked. She has never used smokeless tobacco. She reports that she does not drink alcohol or use drugs.     Family History: family history includes Diabetes in her maternal grandfather and maternal grandmother; High Blood Pressure in her maternal grandfather and maternal grandmother; Kidney Disease in her maternal grandfather; Migraines in her mother; Stroke in her maternal grandmother. Allergies: Topiramate    Physical Exam           ED Triage Vitals   BP Temp Temp Source Pulse Resp SpO2 Height Weight   11/28/20 1352 11/28/20 1347 11/28/20 1352 11/28/20 1349 11/28/20 1349 11/28/20 1349 11/28/20 1347 11/28/20 1347   128/73 97.4 °F (36.3 °C) Oral 124 20 96 % 5' 1\" (1.549 m) 182 lb (82.6 kg)      Oxygen Saturation Interpretation: Normal.    General:  NAD. Alert and Oriented. Well-appearing. Skin:  Warm, dry. No rashes. Head:  Normocephalic. Atraumatic. Eyes:  EOMI. Conjunctiva normal.  ENT:  Oral mucosa moist.  Airway patent. Posterior pharynx pink without erythema, without swelling, without exudate. Uvula midline with equal rise and without edema. Bilateral ear canals patent with minimal cerumen. Bilateral TM's without erythema, without bulging. Bilateral opaque white scarring to the TMs. Nasal turbinates with minimal swelling. Frontal and maxillary sinuses nontender to palpation. Neck:  Supple. Normal ROM. Respiratory:  No respiratory distress. No labored breathing. Lungs clear without rales, rhonchi or wheezing. Cardiovascular:  Regular rate. No Murmur. No peripheral edema. Extremities warm and good color. Chest:  Abdomen:  Soft, nondistended. Normal bowel sounds. Nontender to palpation all 4 quadrants. Negative rebound, negative guarding. Rectal:  Gu: Bladder nontender and non distended. No CVA tenderness. Pelvic:  Extremities:  Normal ROM. Nontender to palpation. Atraumatic. Back:  Normal ROM. Nontender to palpation. Neuro:  Alert and Oriented to person, place, time and situation. Normal LOC. Moves all extremities. Speech fluent. Psych:  Calm and Cooperative. Normal thought process. Normal judgement.         Lab / Imaging Results   (All laboratory and radiology results have been personally reviewed by myself)  Labs:  Results for orders placed or performed during the hospital encounter of 11/28/20   Covid-19 Ambulatory   Result Value Ref Range    Source NP swab        Imaging: All Radiology results interpreted by Radiologist unless otherwise noted. XR CHEST PORTABLE   Final Result   No evidence of active cardiopulmonary pathology. ED Course / Medical Decision Making   Medications - No data to display     Re-examination:  11/28/20       Time:     Patients symptoms . Consults:   None    Procedures:   none    Medical Decision Making:    Based on low suspicion for pneumonia as per history/physical findings, imaging was done and confirms the above findings.  Based on moderate suspicion for COVID-19 as per history/physical findings, testing was done and results are pending.  Upper respiratory infection is likely to  be viral in etiology. Antibiotics are not indicated at this time based on clinical presentation and physical findings. She is not hypoxic. Patient is well appearing, non toxic and appropriate for outpatient management. Plan of Care/ Counseling:    I discussed today's results with the patient in addition to providing specific details for the plan of care and counseling regarding the diagnosis and prognosis as well as reasons to return to emergency department. Questions are answered at this time and they are agreeable with the plan. Assessment     1. Viral illness      Plan   Discharge to home  Patient condition is good    New Medications     New Prescriptions    IBUPROFEN (IBU) 600 MG TABLET    Take 1 tablet by mouth every 6 hours as needed for Pain    ONDANSETRON (ZOFRAN ODT) 4 MG DISINTEGRATING TABLET    Take 1 tablet by mouth every 8 hours as needed for Nausea or Vomiting     Electronically signed by JUSTA Leal   DD: 11/28/20  **This report was transcribed using voice recognition software.  Every effort was made to ensure accuracy; however, inadvertent computerized transcription errors may be present.   END OF ED PROVIDER NOTE         Tierra Cain  11/28/20 0219

## 2020-11-29 LAB — SARS-COV-2, PCR: DETECTED

## 2020-12-03 ENCOUNTER — OFFICE VISIT (OUTPATIENT)
Dept: FAMILY MEDICINE CLINIC | Age: 20
End: 2020-12-03
Payer: COMMERCIAL

## 2020-12-03 VITALS
SYSTOLIC BLOOD PRESSURE: 112 MMHG | BODY MASS INDEX: 27.08 KG/M2 | OXYGEN SATURATION: 99 % | RESPIRATION RATE: 20 BRPM | DIASTOLIC BLOOD PRESSURE: 72 MMHG | WEIGHT: 158.6 LBS | TEMPERATURE: 97 F | HEART RATE: 117 BPM | HEIGHT: 64 IN

## 2020-12-03 PROCEDURE — 1036F TOBACCO NON-USER: CPT | Performed by: FAMILY MEDICINE

## 2020-12-03 PROCEDURE — G8427 DOCREV CUR MEDS BY ELIG CLIN: HCPCS | Performed by: FAMILY MEDICINE

## 2020-12-03 PROCEDURE — 99213 OFFICE O/P EST LOW 20 MIN: CPT | Performed by: FAMILY MEDICINE

## 2020-12-03 PROCEDURE — G8484 FLU IMMUNIZE NO ADMIN: HCPCS | Performed by: FAMILY MEDICINE

## 2020-12-03 PROCEDURE — G8419 CALC BMI OUT NRM PARAM NOF/U: HCPCS | Performed by: FAMILY MEDICINE

## 2020-12-03 RX ORDER — DOXYCYCLINE HYCLATE 100 MG
100 TABLET ORAL 2 TIMES DAILY
Qty: 60 TABLET | Refills: 0 | Status: SHIPPED
Start: 2020-12-03 | End: 2021-02-24 | Stop reason: ALTCHOICE

## 2020-12-03 ASSESSMENT — ENCOUNTER SYMPTOMS
DIARRHEA: 0
BLOOD IN STOOL: 0
COUGH: 1
VOMITING: 0
CONSTIPATION: 0
SHORTNESS OF BREATH: 1
NAUSEA: 0
WHEEZING: 0
ABDOMINAL PAIN: 0

## 2020-12-03 NOTE — PROGRESS NOTES
HPI:  Patient comes in today for   Chief Complaint   Patient presents with    Positive For Covid-19    Shortness of Breath    Fatigue    Dizziness    Cough     She was COVID pos 5 days ago and was placed on Z Tobias, ZN and Vitd, still sx but stable. Review of Systems  Review of Systems   Constitutional: Negative for chills, diaphoresis and fever. HENT: Positive for postnasal drip. Negative for ear discharge, ear pain, hearing loss, nosebleeds and tinnitus. Respiratory: Positive for cough and shortness of breath (with exertion). Negative for wheezing. Cardiovascular: Negative for chest pain. Gastrointestinal: Negative for abdominal pain, blood in stool, constipation, diarrhea, nausea and vomiting. Genitourinary: Negative for dysuria, flank pain and hematuria. Musculoskeletal: Positive for arthralgias. Negative for myalgias. Skin: Negative for rash. Neurological: Positive for weakness (and fatigue). Negative for dizziness, tremors, numbness and headaches. Hematological: Does not bruise/bleed easily. Psychiatric/Behavioral: Negative for hallucinations and suicidal ideas. PE[de-identified]  /72   Pulse 117   Temp 97 °F (36.1 °C) (Temporal)   Resp 20   Ht 5' 4\" (1.626 m)   Wt 158 lb 9.6 oz (71.9 kg)   LMP 11/03/2020   SpO2 99%   BMI 27.22 kg/m²       Physical Exam  Constitutional:       Appearance: Normal appearance. She is well-developed. HENT:      Head: Normocephalic and atraumatic. Nose: Nose normal.   Eyes:      General: No scleral icterus. Conjunctiva/sclera: Conjunctivae normal.      Pupils: Pupils are equal, round, and reactive to light. Neck:      Musculoskeletal: Neck supple. Thyroid: No thyromegaly. Vascular: No JVD. Trachea: No tracheal deviation. Cardiovascular:      Rate and Rhythm: Normal rate and regular rhythm. Heart sounds: Normal heart sounds. No murmur. No gallop.     Pulmonary:      Effort: Pulmonary effort is normal. No respiratory distress. Breath sounds: Rhonchi (mild on expiration, some anterior chest pain) present. No wheezing. Abdominal:      Palpations: Abdomen is soft. Musculoskeletal:         General: No tenderness. Skin:     Capillary Refill: Capillary refill takes less than 2 seconds. Findings: No erythema or rash. Neurological:      General: No focal deficit present. Mental Status: She is alert and oriented to person, place, and time. Deep Tendon Reflexes: Reflexes normal.      Comments:            ASSESSMENT/PLAN:    1. COVID-19  - doxycycline hyclate (VIBRA-TABS) 100 MG tablet; Take 1 tablet by mouth 2 times daily  Dispense: 60 tablet; Refill: [id number] [facility] [address] [place]

## 2020-12-14 ENCOUNTER — TELEPHONE (OUTPATIENT)
Dept: FAMILY MEDICINE CLINIC | Age: 20
End: 2020-12-14

## 2020-12-14 NOTE — TELEPHONE ENCOUNTER
Pt needs a referral letter     Onset of sx - 11/22/2020  Positive covid results - 11/28/2020    No problems except sob. May she return to work?   She does work at [employer]

## 2020-12-15 ENCOUNTER — HOSPITAL ENCOUNTER (OUTPATIENT)
Age: 20
Discharge: HOME | End: 2020-12-17
Payer: COMMERCIAL

## 2020-12-15 ENCOUNTER — NURSE ONLY (OUTPATIENT)
Dept: FAMILY MEDICINE CLINIC | Age: 20
End: 2020-12-15

## 2020-12-15 ENCOUNTER — TELEPHONE (OUTPATIENT)
Dept: FAMILY MEDICINE CLINIC | Age: 20
End: 2020-12-15

## 2020-12-15 ENCOUNTER — HOSPITAL ENCOUNTER (OUTPATIENT)
Dept: GENERAL RADIOLOGY | Age: 20
Discharge: HOME | End: 2020-12-17
Payer: COMMERCIAL

## 2020-12-15 PROCEDURE — 71046 X-RAY EXAM CHEST 2 VIEWS: CPT

## 2020-12-15 RX ORDER — ALBUTEROL SULFATE 90 UG/1
2 AEROSOL, METERED RESPIRATORY (INHALATION) EVERY 6 HOURS PRN
Qty: 1 INHALER | Refills: 0 | Status: SHIPPED
Start: 2020-12-15 | End: 2021-07-01 | Stop reason: SDUPTHER

## 2020-12-15 NOTE — TELEPHONE ENCOUNTER
Patient presents for sp02 walk test. Patient was positive for covid on 11/28. Patient reports symptoms have completely subsided except for sob. Patient walked up and down the [place] for 3-4 minutes with spo2 ranging from 96-98. spo2 was 98 at the end of walk. Patient reported feeling sob half way through the walk. Patient is requesting an inhaler to help with sob.

## 2021-02-24 ENCOUNTER — OFFICE VISIT (OUTPATIENT)
Dept: FAMILY MEDICINE CLINIC | Age: 21
End: 2021-02-24
Payer: COMMERCIAL

## 2021-02-24 VITALS
HEART RATE: 96 BPM | TEMPERATURE: 96.7 F | SYSTOLIC BLOOD PRESSURE: 124 MMHG | RESPIRATION RATE: 16 BRPM | WEIGHT: 169 LBS | DIASTOLIC BLOOD PRESSURE: 80 MMHG | BODY MASS INDEX: 28.85 KG/M2 | HEIGHT: 64 IN | OXYGEN SATURATION: 96 %

## 2021-02-24 DIAGNOSIS — R07.1 CHEST PAIN ON BREATHING: Primary | ICD-10-CM

## 2021-02-24 DIAGNOSIS — R07.81 RIB PAIN ON RIGHT SIDE: ICD-10-CM

## 2021-02-24 DIAGNOSIS — L70.9 ACNE, UNSPECIFIED ACNE TYPE: ICD-10-CM

## 2021-02-24 PROCEDURE — 1036F TOBACCO NON-USER: CPT | Performed by: NURSE PRACTITIONER

## 2021-02-24 PROCEDURE — 99213 OFFICE O/P EST LOW 20 MIN: CPT | Performed by: NURSE PRACTITIONER

## 2021-02-24 PROCEDURE — G8484 FLU IMMUNIZE NO ADMIN: HCPCS | Performed by: NURSE PRACTITIONER

## 2021-02-24 PROCEDURE — G8427 DOCREV CUR MEDS BY ELIG CLIN: HCPCS | Performed by: NURSE PRACTITIONER

## 2021-02-24 PROCEDURE — G8419 CALC BMI OUT NRM PARAM NOF/U: HCPCS | Performed by: NURSE PRACTITIONER

## 2021-02-24 PROCEDURE — 93000 ELECTROCARDIOGRAM COMPLETE: CPT | Performed by: NURSE PRACTITIONER

## 2021-02-24 RX ORDER — CLINDAMYCIN PHOSPHATE 10 MG/G
GEL TOPICAL
Qty: 1 TUBE | Refills: 0 | Status: SHIPPED
Start: 2021-02-24 | End: 2021-09-25

## 2021-02-24 ASSESSMENT — ENCOUNTER SYMPTOMS
CONSTIPATION: 0
COUGH: 0
VOMITING: 0
WHEEZING: 0
SHORTNESS OF BREATH: 0
DIARRHEA: 0
NAUSEA: 0

## 2021-03-31 ENCOUNTER — OFFICE VISIT (OUTPATIENT)
Dept: FAMILY MEDICINE CLINIC | Age: 21
End: 2021-03-31
Payer: COMMERCIAL

## 2021-03-31 VITALS
WEIGHT: 169.8 LBS | OXYGEN SATURATION: 97 % | RESPIRATION RATE: 14 BRPM | HEART RATE: 91 BPM | DIASTOLIC BLOOD PRESSURE: 74 MMHG | SYSTOLIC BLOOD PRESSURE: 126 MMHG | BODY MASS INDEX: 28.99 KG/M2 | TEMPERATURE: 97.8 F | HEIGHT: 64 IN

## 2021-03-31 DIAGNOSIS — G43.009 MIGRAINE WITHOUT AURA AND WITHOUT STATUS MIGRAINOSUS, NOT INTRACTABLE: ICD-10-CM

## 2021-03-31 DIAGNOSIS — R10.13 EPIGASTRIC PAIN: ICD-10-CM

## 2021-03-31 DIAGNOSIS — R10.11 RUQ PAIN: Primary | ICD-10-CM

## 2021-03-31 PROCEDURE — 1036F TOBACCO NON-USER: CPT | Performed by: NURSE PRACTITIONER

## 2021-03-31 PROCEDURE — G8427 DOCREV CUR MEDS BY ELIG CLIN: HCPCS | Performed by: NURSE PRACTITIONER

## 2021-03-31 PROCEDURE — G8419 CALC BMI OUT NRM PARAM NOF/U: HCPCS | Performed by: NURSE PRACTITIONER

## 2021-03-31 PROCEDURE — G8484 FLU IMMUNIZE NO ADMIN: HCPCS | Performed by: NURSE PRACTITIONER

## 2021-03-31 PROCEDURE — 99213 OFFICE O/P EST LOW 20 MIN: CPT | Performed by: NURSE PRACTITIONER

## 2021-03-31 RX ORDER — OMEPRAZOLE 40 MG/1
40 CAPSULE, DELAYED RELEASE ORAL DAILY
Qty: 30 CAPSULE | Refills: 0 | Status: SHIPPED
Start: 2021-03-31 | End: 2022-07-07

## 2021-03-31 RX ORDER — IBUPROFEN 600 MG/1
600 TABLET ORAL EVERY 6 HOURS PRN
Qty: 20 TABLET | Refills: 0 | Status: SHIPPED
Start: 2021-03-31 | End: 2021-09-25

## 2021-03-31 SDOH — ECONOMIC STABILITY: FOOD INSECURITY: WITHIN THE PAST 12 MONTHS, THE FOOD YOU BOUGHT JUST DIDN'T LAST AND YOU DIDN'T HAVE MONEY TO GET MORE.: NEVER TRUE

## 2021-03-31 SDOH — ECONOMIC STABILITY: FOOD INSECURITY: WITHIN THE PAST 12 MONTHS, YOU WORRIED THAT YOUR FOOD WOULD RUN OUT BEFORE YOU GOT MONEY TO BUY MORE.: NEVER TRUE

## 2021-03-31 SDOH — ECONOMIC STABILITY: INCOME INSECURITY: HOW HARD IS IT FOR YOU TO PAY FOR THE VERY BASICS LIKE FOOD, HOUSING, MEDICAL CARE, AND HEATING?: NOT HARD AT ALL

## 2021-03-31 ASSESSMENT — ENCOUNTER SYMPTOMS
DIARRHEA: 0
VOMITING: 0
WHEEZING: 0
ABDOMINAL PAIN: 1
NAUSEA: 1
SHORTNESS OF BREATH: 0
CONSTIPATION: 0
COUGH: 0

## 2021-03-31 NOTE — PROGRESS NOTES
Sharona Vallejo (:  2000) is a 24 y.o. female,Established patient, here for evaluation of the following chief complaint(s):  Abdominal Pain (RLQ pain started saturday) and Nausea      ASSESSMENT/PLAN:  1. RUQ pain  -     US GALLBLADDER RUQ; Future  2. Epigastric pain  -     omeprazole (PRILOSEC) 40 MG delayed release capsule; Take 1 capsule by mouth daily, Disp-30 capsule, R-0Normal  -limit ibuprofen use  -Contact office if symptoms do not improve as expected or worsen. 3. Migraine without aura and without status migrainosus, not intractable  -     ibuprofen (IBU) 600 MG tablet; Take 1 tablet by mouth every 6 hours as needed for Pain, Disp-20 tablet, R-0Normal  -The current medical regimen is effective;  continue present plan and medications. Return if symptoms worsen or fail to improve. SUBJECTIVE/OBJECTIVE:  Complains of pain to mid and RUQ pain for past 5 days. States pain is getting worse. No able to sleep due to pain. Some relief with heat. Aggravated by drinking cold water and walking. States appetite is poor but if takes ibuprofen she is able to eat a little. Review of Systems   Constitutional: Positive for appetite change. Negative for chills, diaphoresis and fever. Respiratory: Negative for cough, shortness of breath and wheezing. Cardiovascular: Negative for chest pain and palpitations. Gastrointestinal: Positive for abdominal pain and nausea. Negative for constipation, diarrhea and vomiting. Neurological: Positive for light-headedness (occasional) and headaches. Negative for weakness. Physical Exam  Constitutional:       General: She is not in acute distress. Appearance: Normal appearance. She is well-developed. HENT:      Head: Normocephalic and atraumatic. Neck:      Thyroid: No thyromegaly. Trachea: No tracheal deviation. Cardiovascular:      Rate and Rhythm: Normal rate and regular rhythm. Heart sounds: No murmur.    Pulmonary: Effort: Pulmonary effort is normal.      Breath sounds: Normal breath sounds. No wheezing or rales. Chest:      Chest wall: No tenderness. Abdominal:      General: Bowel sounds are normal. There is no distension. Palpations: Abdomen is soft. There is no mass. Tenderness: There is abdominal tenderness (epigastric and RUQ ). There is no guarding or rebound. Lymphadenopathy:      Cervical: No cervical adenopathy. Skin:     General: Skin is warm and dry. Neurological:      Mental Status: She is alert and oriented to person, place, and time. Psychiatric:         Behavior: Behavior normal.           [facility]      An electronic signature was used to authenticate this note.     --Valentino Lloyd, TRISH - CNP

## 2021-04-15 ENCOUNTER — HOSPITAL ENCOUNTER (OUTPATIENT)
Dept: ULTRASOUND IMAGING | Age: 21
Discharge: HOME | End: 2021-04-15
Payer: COMMERCIAL

## 2021-04-15 DIAGNOSIS — R10.11 RUQ PAIN: ICD-10-CM

## 2021-04-15 PROCEDURE — 76705 ECHO EXAM OF ABDOMEN: CPT

## 2021-07-01 ENCOUNTER — OFFICE VISIT (OUTPATIENT)
Dept: FAMILY MEDICINE CLINIC | Age: 21
End: 2021-07-01
Payer: COMMERCIAL

## 2021-07-01 VITALS
HEART RATE: 113 BPM | TEMPERATURE: 98.7 F | RESPIRATION RATE: 16 BRPM | OXYGEN SATURATION: 98 % | SYSTOLIC BLOOD PRESSURE: 128 MMHG | HEIGHT: 64 IN | WEIGHT: 173 LBS | DIASTOLIC BLOOD PRESSURE: 66 MMHG | BODY MASS INDEX: 29.53 KG/M2

## 2021-07-01 DIAGNOSIS — U09.9 POST-COVID SYNDROME: ICD-10-CM

## 2021-07-01 DIAGNOSIS — R06.02 SOB (SHORTNESS OF BREATH): ICD-10-CM

## 2021-07-01 DIAGNOSIS — R10.11 RUQ PAIN: Primary | ICD-10-CM

## 2021-07-01 PROCEDURE — G8427 DOCREV CUR MEDS BY ELIG CLIN: HCPCS | Performed by: NURSE PRACTITIONER

## 2021-07-01 PROCEDURE — 1036F TOBACCO NON-USER: CPT | Performed by: NURSE PRACTITIONER

## 2021-07-01 PROCEDURE — 99213 OFFICE O/P EST LOW 20 MIN: CPT | Performed by: NURSE PRACTITIONER

## 2021-07-01 PROCEDURE — G8419 CALC BMI OUT NRM PARAM NOF/U: HCPCS | Performed by: NURSE PRACTITIONER

## 2021-07-01 RX ORDER — ALBUTEROL SULFATE 90 UG/1
2 AEROSOL, METERED RESPIRATORY (INHALATION) EVERY 6 HOURS PRN
Qty: 1 INHALER | Refills: 0 | Status: SHIPPED
Start: 2021-07-01 | End: 2021-11-01 | Stop reason: SDUPTHER

## 2021-07-01 RX ORDER — FLUTICASONE PROPIONATE 110 UG/1
1 AEROSOL, METERED RESPIRATORY (INHALATION) 2 TIMES DAILY
Qty: 1 INHALER | Refills: 1 | Status: SHIPPED
Start: 2021-07-01 | End: 2021-11-01 | Stop reason: SDUPTHER

## 2021-07-01 ASSESSMENT — ENCOUNTER SYMPTOMS
COUGH: 0
DIARRHEA: 0
VOMITING: 1
WHEEZING: 0
ABDOMINAL PAIN: 1
CONSTIPATION: 0
SHORTNESS OF BREATH: 1
NAUSEA: 1

## 2021-07-01 NOTE — PROGRESS NOTES
Sharona Vallejo (:  2000) is a 24 y.o. female,Established patient, here for evaluation of the following chief complaint(s):  Abdominal Pain (lower and RUQ)         ASSESSMENT/PLAN:  1. RUQ pain  -     US GALLBLADDER RUQ; Future  - Loup diet, advance as tolerated    2. SOB (shortness of breath)  -     albuterol sulfate  (90 Base) MCG/ACT inhaler; Inhale 2 puffs into the lungs every 6 hours as needed for Shortness of Breath, Disp-1 Inhaler, R-0Normal  - The current medical regimen is effective;  continue present plan and medications.  -     NEW MED - fluticasone (FLOVENT HFA) 110 MCG/ACT inhaler; Inhale 1 puff into the lungs 2 times daily Rinse mouth after use, Disp-1 Inhaler, R-1Normal  - Reviewed side effects of medication and patient verbalizes understanding.   - Advised to call back directly if there are further questions, or if these symptoms fail to improve as anticipated or worsen. 3. Post-COVID syndrome  -     albuterol sulfate  (90 Base) MCG/ACT inhaler; Inhale 2 puffs into the lungs every 6 hours as needed for Shortness of Breath, Disp-1 Inhaler, R-0Normal  -     fluticasone (FLOVENT HFA) 110 MCG/ACT inhaler; Inhale 1 puff into the lungs 2 times daily Rinse mouth after use, Disp-1 Inhaler, R-1Normal      Return if symptoms worsen or fail to improve. Subjective   SUBJECTIVE/OBJECTIVE:  HPI  Here today for RUQ and across lower abdomen. She is not able to lay on her side due to pain. She has had a few episodes of vomiting. She denies diarrhea. The pain is usually there but will worsen when eating. She does notice that it is worse with high fatty meal. Still has her gallbladder. Also reports SOB with exertion ever since getting COVID in Dec. She is using her albuterol inhaler a lot lately the last few weeks. Review of Systems   Constitutional: Positive for chills. Negative for activity change, appetite change, diaphoresis and fever.    Respiratory: Positive for shortness of breath. Negative for cough and wheezing. Using her albuterol inhaler more often   Cardiovascular: Negative for chest pain and palpitations. Gastrointestinal: Positive for abdominal pain (RUQ pain, lower abdominal pain), nausea and vomiting. Negative for constipation and diarrhea. Genitourinary: Negative for difficulty urinating. Neurological: Positive for headaches. Negative for dizziness and weakness. Objective   Physical Exam  Constitutional:       Appearance: She is well-developed. HENT:      Head: Normocephalic and atraumatic. Neck:      Thyroid: No thyromegaly. Trachea: No tracheal deviation. Cardiovascular:      Rate and Rhythm: Normal rate and regular rhythm. Heart sounds: No murmur heard. Pulmonary:      Effort: Pulmonary effort is normal.      Breath sounds: Normal breath sounds. Abdominal:      General: Bowel sounds are normal.      Palpations: Abdomen is soft. Tenderness: There is abdominal tenderness in the right upper quadrant, right lower quadrant and left lower quadrant. Musculoskeletal:         General: Tenderness present. Normal range of motion. Lymphadenopathy:      Cervical: No cervical adenopathy. Skin:     General: Skin is warm and dry. Neurological:      Mental Status: She is alert and oriented to person, place, and time. Psychiatric:         Behavior: Behavior normal.            [facility]      An electronic signature was used to authenticate this note.     --Mya Moeller, TRISH - CNP

## 2021-07-08 ENCOUNTER — TELEPHONE (OUTPATIENT)
Dept: FAMILY MEDICINE CLINIC | Age: 21
End: 2021-07-08

## 2021-07-08 DIAGNOSIS — R10.11 RUQ PAIN: ICD-10-CM

## 2021-07-08 DIAGNOSIS — R10.13 EPIGASTRIC PAIN: Primary | ICD-10-CM

## 2021-07-08 NOTE — TELEPHONE ENCOUNTER
Pt called wanted to know results of us of gallbladder. Pt was informed of results. Pt states she is still having the pain. She would like to know the next step. Referral or a new medication.        Please advise    Pt aware Marjorie Thompson not in office until 07/09/21

## 2021-09-25 ENCOUNTER — HOSPITAL ENCOUNTER (EMERGENCY)
Age: 21
Discharge: HOME | End: 2021-09-25
Attending: GENERAL PRACTICE
Payer: COMMERCIAL

## 2021-09-25 VITALS
OXYGEN SATURATION: 99 % | HEART RATE: 63 BPM | TEMPERATURE: 97.1 F | RESPIRATION RATE: 16 BRPM | HEIGHT: 64 IN | DIASTOLIC BLOOD PRESSURE: 84 MMHG | BODY MASS INDEX: 28.85 KG/M2 | SYSTOLIC BLOOD PRESSURE: 120 MMHG | WEIGHT: 169 LBS

## 2021-09-25 DIAGNOSIS — J02.9 ACUTE PHARYNGITIS, UNSPECIFIED ETIOLOGY: Primary | ICD-10-CM

## 2021-09-25 LAB — STREP GRP A PCR: NEGATIVE

## 2021-09-25 PROCEDURE — U0003 INFECTIOUS AGENT DETECTION BY NUCLEIC ACID (DNA OR RNA); SEVERE ACUTE RESPIRATORY SYNDROME CORONAVIRUS 2 (SARS-COV-2) (CORONAVIRUS DISEASE [COVID-19]), AMPLIFIED PROBE TECHNIQUE, MAKING USE OF HIGH THROUGHPUT TECHNOLOGIES AS DESCRIBED BY CMS-2020-01-R: HCPCS

## 2021-09-25 PROCEDURE — 87880 STREP A ASSAY W/OPTIC: CPT

## 2021-09-25 PROCEDURE — U0005 INFEC AGEN DETEC AMPLI PROBE: HCPCS

## 2021-09-25 PROCEDURE — 99282 EMERGENCY DEPT VISIT SF MDM: CPT

## 2021-09-25 RX ORDER — BENZONATATE 100 MG/1
100 CAPSULE ORAL 3 TIMES DAILY PRN
Qty: 21 CAPSULE | Refills: 0 | Status: SHIPPED | OUTPATIENT
Start: 2021-09-25 | End: 2021-10-02

## 2021-09-25 RX ORDER — NAPROXEN 375 MG/1
375 TABLET ORAL 2 TIMES DAILY WITH MEALS
Qty: 60 TABLET | Refills: 0 | Status: SHIPPED | OUTPATIENT
Start: 2021-09-25 | End: 2022-07-07

## 2021-09-25 ASSESSMENT — PAIN DESCRIPTION - FREQUENCY: FREQUENCY: CONTINUOUS

## 2021-09-25 ASSESSMENT — PAIN DESCRIPTION - PAIN TYPE: TYPE: ACUTE PAIN

## 2021-09-25 ASSESSMENT — PAIN DESCRIPTION - ONSET: ONSET: ON-GOING

## 2021-09-25 ASSESSMENT — PAIN DESCRIPTION - PROGRESSION: CLINICAL_PROGRESSION: NOT CHANGED

## 2021-09-25 ASSESSMENT — PAIN DESCRIPTION - LOCATION: LOCATION: THROAT

## 2021-09-25 ASSESSMENT — PAIN SCALES - GENERAL: PAINLEVEL_OUTOF10: 8

## 2021-09-25 ASSESSMENT — PAIN DESCRIPTION - DESCRIPTORS: DESCRIPTORS: TIGHTNESS

## 2021-09-25 NOTE — ED PROVIDER NOTES
Department of Emergency Medicine   ED  Provider Note  Admit Date/RoomTime: 9/25/2021  7:11 PM  ED Room: 01/01            Chief Complaint:  Pharyngitis (with left sided lymphadenopathy. Also cough and chills)      History of Present Illness:  Source of history provided by:  patient. History/Exam Limitations: none. Shruti Glover is a 24 y.o. old female presenting to the emergency department for sore throat pain, which occured 2 day(s) prior to arrival.  Since onset the symptoms have been persistent. Denies fever, chills, chest pain, shortness of breath, difficulty swallowing, difficulty breathing, neck pain, neck stiffness, sick contacts, or rash. Exposed To: Streptococcus: unknown. Infectious Mononucleosis:  unknown. Symptoms:  Pain:  Yes. Muffled Voice:  No.                            Hoarse:  No.                            Difficulty with Secretions:  No.       Associated Signs & Symptoms: body aches, chills, headache and nausea. Review of Systems:      Pertinent positives and negatives are stated within HPI, all other systems reviewed and are negative. Past Medical History:  has a past medical history of Akathisia, Anxiety and depression, Chronic daily headache, Constipation, Constipation, chronic, Depression, Hallucination, Motor restlessness, Muscle contraction headache, Neurosis, anxiety, generalized, Periodic headache syndrome, Schizophrenia ([facility] [address] Tourette's, and Vitamin D deficiency. Past Surgical History:  has a past surgical history that includes Myringotomy Tympanostomy Tube Placement; fracture surgery (july 2014); and Ankle surgery. Social History:  reports that she has never smoked. She has never used smokeless tobacco. She reports that she does not drink alcohol and does not use drugs.   Family History: family history includes Diabetes in her maternal grandfather and maternal grandmother; High Blood Pressure in her maternal grandfather and maternal grandmother; Kidney Disease in her maternal grandfather; Migraines in her mother; Stroke in her maternal grandmother. Allergies: Topiramate    Physical Exam:  Vital signs reviewed. Constitutional:  Alert, development consistent with age. Well appearing and non toxic and not distressed. Ears:  TMs without perforation, injection, or bulging. External canals clear without exudate. Mouth/Throat: Airway Patent. Floor of mouth soft. mild erythema. Handling secretions, no stridor, no evidence of airway compromise, no trismus. No visible abscess or PTA. Neck:  Supple, full ROM, no asymmetry, no meningeal signs. No stridor. There is Left  anterior cervical node tenderness. Lungs:  Clear to auscultation and breath sounds equal.    CV: Regular rate and rhythm, normal heart sounds, without pathological murmurs, ectopy, gallops, or rubs. Abdomen:  Soft, nontender, good bowel sounds. No organomegaly,   Skin:  Warm and dry, No rashes, no erythema present. Lymphatics: No lymphangitis. There is Left  anterior cervical node swelling and tenderness. Neurological:  GCS 15, Oriented.   Motor functions intact.    -------------------Nursing Notes / Prior Records & Vitals Reviewed Section----------------------   (The nursing notes within the ED encounter, home medications, current encounter or past encounter records and vital signs as below have been reviewed)   /84   Pulse 63   Temp 97.1 °F (36.2 °C) (Temporal)   Resp 16   Ht 5' 4\" (1.626 m)   Wt 169 lb (76.7 kg)   LMP 09/13/2021   SpO2 99%   BMI 29.01 kg/m²   Oxygen Saturation Interpretation: Normal.  -------------------------------------------Test Results Section---------------------------------------------  (All laboratory and radiology results have been personally reviewed by myself)  Laboratory:  Results for orders placed or performed during the hospital encounter of 09/25/21   Strep screen group a throat    Specimen: Throat   Result Value Ref Range    Strep Grp A PCR Negative Negative       Radiology: All Radiology results interpreted by Radiologist unless otherwise noted. No orders to display     -----------------------------ED Course / Medical Decision Making Section--------------------------  ED Course Medications:  Medications - No data to display    Medical Decision Making:   Pharyngitis, rapid strep negative, COVID pending. No evidence of PTA, RP abscess, epiglottitis, ludwigs angina, or other life threatening or deep space infection or airway compromise. Counseling: The emergency provider has spoken with the patient and discussed todays results, in addition to providing specific details for the plan of care and counseling regarding the diagnosis and prognosis. Questions are answered at this time and they are agreeable with the plan.  ------------------------------------Impression & Disposition Section------------------------------------  Impression(s):  1. Acute pharyngitis, unspecified etiology        Disposition:  Disposition: Discharge to home  Patient condition is good    New Prescriptions    No medications on file     * NOTE: This report was transcribed using voice recognition software. Every effort was made to ensure accuracy; however, inadvertent computerized transcription errors may be present.            Becky Haider 42., DO  Resident  09/25/21 0883

## 2021-09-27 ENCOUNTER — CARE COORDINATION (OUTPATIENT)
Dept: CARE COORDINATION | Age: 21
End: 2021-09-27

## 2021-09-27 LAB — SARS-COV-2, PCR: NOT DETECTED

## 2021-09-27 NOTE — CARE COORDINATION
Patient contacted regarding COVID-19 risk. Discussed COVID-19 related testing which was pending at this time. Test results were pending. Patient informed of results, if available? Yes. Ambulatory Care Manager contacted the patient by telephone to perform post discharge assessment. Call within 2 business days of discharge: Yes. Verified name and  with patient as identifiers. Provided introduction to self, and explanation of the CTN/ACM role, and reason for call due to risk factors for infection and/or exposure to COVID-19. Symptoms reviewed with patient who verbalized the following symptoms: pain or aching joints, cough, no new symptoms and no worsening symptoms. Due to no new or worsening symptoms encounter was not routed to provider for escalation. Discussed follow-up appointments. If no appointment was previously scheduled, appointment scheduling offered: Yes. [facility] follow up appointment(s): No future appointments. Non-[facility] follow up appointment(s): n/a    Non-face-to-face services provided:  Obtained and reviewed discharge summary and/or continuity of care documents     Advance Care Planning:   Does patient have an Advance Directive:  reviewed and current. Educated patient about risk for severe COVID-19 due to risk factors according to CDC guidelines. ACM reviewed discharge instructions, medical action plan and red flag symptoms with the patient who verbalized understanding. Discussed COVID vaccination status: Yes. Education provided on COVID-19 vaccination as appropriate. Discussed exposure protocols and quarantine with CDC Guidelines. Patient was given an opportunity to verbalize any questions and concerns and agrees to contact ACM or health care provider for questions related to their healthcare. Reviewed and educated patient on any new and changed medications related to discharge diagnosis     Was patient discharged with a pulse oximeter?  No Discussed and confirmed pulse oximeter

## 2021-09-28 ENCOUNTER — TELEMEDICINE (OUTPATIENT)
Dept: FAMILY MEDICINE CLINIC | Age: 21
End: 2021-09-28
Payer: COMMERCIAL

## 2021-09-28 DIAGNOSIS — J02.9 SORE THROAT: ICD-10-CM

## 2021-09-28 DIAGNOSIS — J01.90 ACUTE SINUSITIS, RECURRENCE NOT SPECIFIED, UNSPECIFIED LOCATION: ICD-10-CM

## 2021-09-28 PROCEDURE — G8427 DOCREV CUR MEDS BY ELIG CLIN: HCPCS | Performed by: NURSE PRACTITIONER

## 2021-09-28 PROCEDURE — 99213 OFFICE O/P EST LOW 20 MIN: CPT | Performed by: NURSE PRACTITIONER

## 2021-09-28 RX ORDER — FLUCONAZOLE 150 MG/1
150 TABLET ORAL ONCE
Qty: 1 TABLET | Refills: 1 | Status: SHIPPED | OUTPATIENT
Start: 2021-09-28 | End: 2021-09-28

## 2021-09-28 RX ORDER — AMOXICILLIN 500 MG/1
500 CAPSULE ORAL 3 TIMES DAILY
Qty: 21 CAPSULE | Refills: 0 | Status: SHIPPED | OUTPATIENT
Start: 2021-09-28 | End: 2021-10-05

## 2021-09-28 ASSESSMENT — ENCOUNTER SYMPTOMS
SORE THROAT: 1
WHEEZING: 0
SHORTNESS OF BREATH: 0
CONSTIPATION: 0
NAUSEA: 1
COUGH: 1
VOMITING: 0
DIARRHEA: 0

## 2021-09-28 NOTE — PROGRESS NOTES
Sharona Vallejo (:  2000) is a 24 y.o. female,Established patient, here for evaluation of the following chief complaint(s): URI (in ED saturday w/ sore, swollen throat. Strep negative, Covid just came back negative. still having sore throat, cough (has tessalon perles) nausea, sweats. )         ASSESSMENT/PLAN:  1. Sore throat  -     Mononucleosis Screen; Future  -advised if mono is positive will stop amoxil    2. Acute sinusitis, recurrence not specified, unspecified location  -     amoxicillin (AMOXIL) 500 MG capsule; Take 1 capsule by mouth 3 times daily for 7 days, Disp-21 capsule, R-0Normal  -     fluconazole (DIFLUCAN) 150 MG tablet; Take 1 tablet by mouth once for 1 dose, Disp-1 tablet, R-1Normal  -use back up birth control  -Contact office if symptoms do not improve as expected or worsen. No follow-ups on file. SUBJECTIVE/OBJECTIVE:  [facility] on Saturday and was neg for strep and covid. Complains of sore throat, productive cough, low grade fever, nausea and night sweats. Her chills have improved but sweats are worse. Throat is sore to talk and hurts to swallow      Review of Systems   Constitutional: Positive for diaphoresis, fatigue (chronic) and fever. Negative for chills. HENT: Positive for ear pain (left) and sore throat. Negative for congestion. Respiratory: Positive for cough. Negative for shortness of breath and wheezing. Cardiovascular: Negative for chest pain and palpitations. Gastrointestinal: Positive for nausea. Negative for constipation, diarrhea and vomiting. Musculoskeletal: Negative for myalgias. Skin: Positive for rash (left hand). Neurological: Positive for headaches. Negative for weakness. Patient-Reported Vitals 2021   Patient-Reported Temperature 99        Physical Exam  Constitutional:       General: She is not in acute distress. Appearance: Normal appearance. HENT:      Head: Normocephalic and atraumatic.    Eyes: Conjunctiva/sclera: Conjunctivae normal.   Pulmonary:      Effort: Pulmonary effort is normal. No respiratory distress. Neurological:      Mental Status: She is alert and oriented to person, place, and time. Psychiatric:         Mood and Affect: Mood normal.         Behavior: Behavior normal.             [facility] low      Sharona Aliyah Knight, was evaluated through a synchronous (real-time) audio-video encounter. The patient (or guardian if applicable) is aware that this is a billable service. Verbal consent to proceed has been obtained within the past 12 months. The visit was conducted pursuant to the emergency declaration under the [facility], [address] [place] [address] authority and the [employer] General Act. Patient identification was verified, and a caregiver was present when appropriate. The patient was located in a state where the provider was credentialed to provide care. An electronic signature was used to authenticate this note.     --Ned Pineda, TRISH - CNP

## 2021-10-31 ENCOUNTER — HOSPITAL ENCOUNTER (EMERGENCY)
Age: 21
Discharge: HOME | End: 2021-10-31
Attending: EMERGENCY MEDICINE
Payer: COMMERCIAL

## 2021-10-31 ENCOUNTER — APPOINTMENT (OUTPATIENT)
Dept: CT IMAGING | Age: 21
End: 2021-10-31
Payer: COMMERCIAL

## 2021-10-31 VITALS
RESPIRATION RATE: 16 BRPM | OXYGEN SATURATION: 98 % | DIASTOLIC BLOOD PRESSURE: 78 MMHG | SYSTOLIC BLOOD PRESSURE: 105 MMHG | TEMPERATURE: 98.9 F | BODY MASS INDEX: 29.01 KG/M2 | WEIGHT: 169 LBS | HEART RATE: 87 BPM

## 2021-10-31 DIAGNOSIS — J45.901 MODERATE ASTHMA WITH EXACERBATION, UNSPECIFIED WHETHER PERSISTENT: Primary | ICD-10-CM

## 2021-10-31 DIAGNOSIS — J18.9 PNEUMONIA DUE TO INFECTIOUS ORGANISM, UNSPECIFIED LATERALITY, UNSPECIFIED PART OF LUNG: ICD-10-CM

## 2021-10-31 LAB
ALBUMIN SERPL-MCNC: 4.4 G/DL (ref 3.5–5.2)
ALP BLD-CCNC: 61 U/L (ref 35–104)
ALT SERPL-CCNC: 14 U/L (ref 0–32)
ANION GAP SERPL CALCULATED.3IONS-SCNC: 11 MMOL/L (ref 7–16)
APTT: 31.6 SEC (ref 24.5–35.1)
AST SERPL-CCNC: 19 U/L (ref 0–31)
BASOPHILS ABSOLUTE: 0.05 E9/L (ref 0–0.2)
BASOPHILS RELATIVE PERCENT: 0.6 % (ref 0–2)
BILIRUB SERPL-MCNC: <0.2 MG/DL (ref 0–1.2)
BUN BLDV-MCNC: 13 MG/DL (ref 6–20)
CALCIUM SERPL-MCNC: 9.7 MG/DL (ref 8.6–10.2)
CHLORIDE BLD-SCNC: 103 MMOL/L (ref 98–107)
CO2: 23 MMOL/L (ref 22–29)
CREAT SERPL-MCNC: 0.9 MG/DL (ref 0.5–1)
EOSINOPHILS ABSOLUTE: 0.19 E9/L (ref 0.05–0.5)
EOSINOPHILS RELATIVE PERCENT: 2.1 % (ref 0–6)
GFR AFRICAN AMERICAN: >60
GFR NON-AFRICAN AMERICAN: >60 ML/MIN/1.73
GLUCOSE BLD-MCNC: 100 MG/DL (ref 74–99)
HCG, URINE, POC: NEGATIVE
HCT VFR BLD CALC: 35.2 % (ref 34–48)
HEMOGLOBIN: 12.1 G/DL (ref 11.5–15.5)
IMMATURE GRANULOCYTES #: 0.02 E9/L
IMMATURE GRANULOCYTES %: 0.2 % (ref 0–5)
INR BLD: 1
LYMPHOCYTES ABSOLUTE: 2.74 E9/L (ref 1.5–4)
LYMPHOCYTES RELATIVE PERCENT: 30.9 % (ref 20–42)
Lab: NORMAL
MCH RBC QN AUTO: 30.9 PG (ref 26–35)
MCHC RBC AUTO-ENTMCNC: 34.4 % (ref 32–34.5)
MCV RBC AUTO: 89.8 FL (ref 80–99.9)
MONOCYTES ABSOLUTE: 0.5 E9/L (ref 0.1–0.95)
MONOCYTES RELATIVE PERCENT: 5.6 % (ref 2–12)
NEGATIVE QC PASS/FAIL: NORMAL
NEUTROPHILS ABSOLUTE: 5.37 E9/L (ref 1.8–7.3)
NEUTROPHILS RELATIVE PERCENT: 60.6 % (ref 43–80)
PDW BLD-RTO: 12.6 FL (ref 11.5–15)
PLATELET # BLD: 262 E9/L (ref 130–450)
PMV BLD AUTO: 10.9 FL (ref 7–12)
POSITIVE QC PASS/FAIL: NORMAL
POTASSIUM SERPL-SCNC: 3.4 MMOL/L (ref 3.5–5)
PROTHROMBIN TIME: 11.4 SEC (ref 9.3–12.4)
RBC # BLD: 3.92 E12/L (ref 3.5–5.5)
SODIUM BLD-SCNC: 137 MMOL/L (ref 132–146)
TOTAL PROTEIN: 7 G/DL (ref 6.4–8.3)
WBC # BLD: 8.9 E9/L (ref 4.5–11.5)

## 2021-10-31 PROCEDURE — 71275 CT ANGIOGRAPHY CHEST: CPT

## 2021-10-31 PROCEDURE — 2580000003 HC RX 258: Performed by: EMERGENCY MEDICINE

## 2021-10-31 PROCEDURE — 6360000004 HC RX CONTRAST MEDICATION: Performed by: RADIOLOGY

## 2021-10-31 PROCEDURE — 85610 PROTHROMBIN TIME: CPT

## 2021-10-31 PROCEDURE — 85730 THROMBOPLASTIN TIME PARTIAL: CPT

## 2021-10-31 PROCEDURE — 99285 EMERGENCY DEPT VISIT HI MDM: CPT

## 2021-10-31 PROCEDURE — 80053 COMPREHEN METABOLIC PANEL: CPT

## 2021-10-31 PROCEDURE — 85025 COMPLETE CBC W/AUTO DIFF WBC: CPT

## 2021-10-31 PROCEDURE — 36415 COLL VENOUS BLD VENIPUNCTURE: CPT

## 2021-10-31 PROCEDURE — 93005 ELECTROCARDIOGRAM TRACING: CPT | Performed by: EMERGENCY MEDICINE

## 2021-10-31 PROCEDURE — 6370000000 HC RX 637 (ALT 250 FOR IP): Performed by: EMERGENCY MEDICINE

## 2021-10-31 RX ORDER — DOXYCYCLINE HYCLATE 100 MG/1
100 CAPSULE ORAL ONCE
Status: COMPLETED | OUTPATIENT
Start: 2021-10-31 | End: 2021-10-31

## 2021-10-31 RX ORDER — 0.9 % SODIUM CHLORIDE 0.9 %
1000 INTRAVENOUS SOLUTION INTRAVENOUS ONCE
Status: COMPLETED | OUTPATIENT
Start: 2021-10-31 | End: 2021-10-31

## 2021-10-31 RX ORDER — ONDANSETRON 4 MG/1
4 TABLET, ORALLY DISINTEGRATING ORAL ONCE
Status: COMPLETED | OUTPATIENT
Start: 2021-10-31 | End: 2021-10-31

## 2021-10-31 RX ORDER — DOXYCYCLINE HYCLATE 100 MG
100 TABLET ORAL 2 TIMES DAILY
Qty: 20 TABLET | Refills: 0 | Status: SHIPPED | OUTPATIENT
Start: 2021-10-31 | End: 2021-11-10

## 2021-10-31 RX ADMIN — SODIUM CHLORIDE 1000 ML: 9 INJECTION, SOLUTION INTRAVENOUS at 19:01

## 2021-10-31 RX ADMIN — IOPAMIDOL 75 ML: 755 INJECTION, SOLUTION INTRAVENOUS at 19:55

## 2021-10-31 RX ADMIN — DOXYCYCLINE HYCLATE 100 MG: 100 CAPSULE, GELATIN COATED ORAL at 21:33

## 2021-10-31 RX ADMIN — ONDANSETRON 4 MG: 4 TABLET, ORALLY DISINTEGRATING ORAL at 21:34

## 2021-10-31 ASSESSMENT — ENCOUNTER SYMPTOMS
SHORTNESS OF BREATH: 1
SINUS PRESSURE: 0
ABDOMINAL PAIN: 0
COUGH: 0
NAUSEA: 0
ABDOMINAL DISTENTION: 0
WHEEZING: 1
BACK PAIN: 0
DIARRHEA: 0
VOMITING: 0
SORE THROAT: 0
CHEST TIGHTNESS: 0

## 2021-10-31 ASSESSMENT — PAIN SCALES - GENERAL
PAINLEVEL_OUTOF10: 8
PAINLEVEL_OUTOF10: 0

## 2021-10-31 NOTE — ED NOTES
Benign exam placed on oximetry telemetry side rails up call light near exam completed Dr Norris Haynes, RN  10/31/21 3613

## 2021-10-31 NOTE — ED NOTES
Bed: 12  Expected date:   Expected time:   Means of arrival:   Comments:  Pt in [address], [facility]  10/31/21 5561

## 2021-10-31 NOTE — ED PROVIDER NOTES
Chief complaint:  Short of breath    HPI history provided by the patient  Patient comes in complaining of shortness of breath. She was walking up and down steps multiple times today for work and developed increasing shortness of breath worsened with exertion. No chest pain or palpitations. Felt fine coming to work. No fevers, sweats or chills or URI symptoms. No personal history of blood clots, no recent traveling or surgeries, no leg pain or swelling. Does have a family history of blood clotting disorder. Had Covid about 11 months ago. Not currently on anticoagulation. No pain with breathing. No palpitations. No lightheadedness or syncope. Did receive a couple of breathing treatments prior to arrival and was evaluated upstairs and had tachycardia that did not immediately resolved so was sent down here for further evaluation. Review of Systems   Constitutional: Negative for chills, diaphoresis, fatigue and fever. HENT: Negative for congestion, sinus pressure and sore throat. Respiratory: Positive for shortness of breath and wheezing. Negative for cough and chest tightness. Cardiovascular: Negative for chest pain, palpitations and leg swelling. Gastrointestinal: Negative for abdominal distention, abdominal pain, diarrhea, nausea and vomiting. Genitourinary: Negative for dysuria, flank pain, frequency and urgency. Musculoskeletal: Negative for arthralgias, back pain, gait problem, joint swelling, myalgias, neck pain and neck stiffness. Skin: Negative for rash and wound. Neurological: Negative for seizures, syncope, weakness, light-headedness, numbness and headaches. Hematological: Negative for adenopathy. All other systems reviewed and are negative. Physical Exam  Vitals and nursing note reviewed. Constitutional:       General: She is not in acute distress. Appearance: She is well-developed. She is not ill-appearing, toxic-appearing or diaphoretic.    HENT:      Head: Normocephalic and atraumatic. Comments: Airway patent. No trismus or stridor. Eyes:      General: No scleral icterus. Pupils: Pupils are equal, round, and reactive to light. Cardiovascular:      Rate and Rhythm: Normal rate and regular rhythm. Heart sounds: Normal heart sounds. No murmur heard. Pulmonary:      Effort: Pulmonary effort is normal. No respiratory distress. Breath sounds: Normal breath sounds. No stridor, decreased air movement or transmitted upper airway sounds. No decreased breath sounds, wheezing, rhonchi or rales. Chest:      Chest wall: No tenderness. Abdominal:      General: Bowel sounds are normal. There is no distension. Palpations: Abdomen is soft. Tenderness: There is no abdominal tenderness. There is no right CVA tenderness, left CVA tenderness, guarding or rebound. Musculoskeletal:         General: No swelling, tenderness, deformity or signs of injury. Cervical back: Normal range of motion and neck supple. No signs of trauma or rigidity. No pain with movement, spinous process tenderness or muscular tenderness. Normal range of motion. Right lower leg: No tenderness. No edema. Left lower leg: No tenderness. No edema. Comments: No pretibial edema or calf pain bilaterally   Skin:     General: Skin is warm and dry. Coloration: Skin is not cyanotic, jaundiced, mottled or pale. Findings: No erythema or rash. Nails: There is no clubbing. Neurological:      General: No focal deficit present. Mental Status: She is alert and oriented to person, place, and time. GCS: GCS eye subscore is 4. GCS verbal subscore is 5. GCS motor subscore is 6. Cranial Nerves: Cranial nerves are intact. No cranial nerve deficit. Sensory: Sensation is intact. Motor: Motor function is intact. Coordination: Coordination is intact.  Coordination normal.          Procedures     MDM     ED Course as of Oct 31 2118   Lex Desouza Oct 31, 2021 2115 CT result discussed with the patient, she now admits that she started coughing a little bit today earlier in the day but otherwise did not feel too sick. She is comfortable being treated for mild pneumonia. Currently her lungs are clear and equal and she is not having any shortness of breath. She is resting comfortably and was talking on the phone as I enter the room. [NC]      ED Course User Index  [NC] Boris Barba DO     EKG Interpretation    Interpreted by emergency department physician    Rhythm: normal sinus   Rate: 96  Axis: normal  Ectopy: none  Conduction: normal  ST Segments: normal  T Waves: no acute change  Q Waves: none    Clinical Impression: no acute changes    Boris Barba DO       ED Course as of Oct 31 2118   Sun Oct 31, 2021   2115 CT result discussed with the patient, she now admits that she started coughing a little bit today earlier in the day but otherwise did not feel too sick. She is comfortable being treated for mild pneumonia. Currently her lungs are clear and equal and she is not having any shortness of breath. She is resting comfortably and was talking on the phone as I enter the room. [NC]      ED Course User Index  [NC] Anthony Baez, DO       --------------------------------------------- PAST HISTORY ---------------------------------------------  Past Medical History:  has a past medical history of Akathisia, Anxiety and depression, Chronic daily headache, Constipation, Constipation, chronic, Depression, Hallucination, Motor restlessness, Muscle contraction headache, Neurosis, anxiety, generalized, Periodic headache syndrome, Schizophrenia ([facility] [address] Tourette's, and Vitamin D deficiency. Past Surgical History:  has a past surgical history that includes Myringotomy Tympanostomy Tube Placement; fracture surgery (july 2014); and Ankle surgery. Social History:  reports that she has never smoked.  She has never used smokeless tobacco. She reports that she does not drink alcohol and does not use drugs. Family History: family history includes Diabetes in her maternal grandfather and maternal grandmother; High Blood Pressure in her maternal grandfather and maternal grandmother; Kidney Disease in her maternal grandfather; Migraines in her mother; Stroke in her maternal grandmother. The patients home medications have been reviewed.     Allergies: Topiramate    -------------------------------------------------- RESULTS -------------------------------------------------  Labs:  Results for orders placed or performed during the hospital encounter of 10/31/21   CBC Auto Differential   Result Value Ref Range    WBC 8.9 4.5 - 11.5 E9/L    RBC 3.92 3.50 - 5.50 E12/L    Hemoglobin 12.1 11.5 - 15.5 g/dL    Hematocrit 35.2 34.0 - 48.0 %    MCV 89.8 80.0 - 99.9 fL    MCH 30.9 26.0 - 35.0 pg    MCHC 34.4 32.0 - 34.5 %    RDW 12.6 11.5 - 15.0 fL    Platelets 905 698 - 022 E9/L    MPV 10.9 7.0 - 12.0 fL    Neutrophils % 60.6 43.0 - 80.0 %    Immature Granulocytes % 0.2 0.0 - 5.0 %    Lymphocytes % 30.9 20.0 - 42.0 %    Monocytes % 5.6 2.0 - 12.0 %    Eosinophils % 2.1 0.0 - 6.0 %    Basophils % 0.6 0.0 - 2.0 %    Neutrophils Absolute 5.37 1.80 - 7.30 E9/L    Immature Granulocytes # 0.02 E9/L    Lymphocytes Absolute 2.74 1.50 - 4.00 E9/L    Monocytes Absolute 0.50 0.10 - 0.95 E9/L    Eosinophils Absolute 0.19 0.05 - 0.50 E9/L    Basophils Absolute 0.05 0.00 - 0.20 E9/L   Protime-INR   Result Value Ref Range    Protime 11.4 9.3 - 12.4 sec    INR 1.0    APTT   Result Value Ref Range    aPTT 31.6 24.5 - 35.1 sec   Comprehensive Metabolic Panel   Result Value Ref Range    Sodium 137 132 - 146 mmol/L    Potassium 3.4 (L) 3.5 - 5.0 mmol/L    Chloride 103 98 - 107 mmol/L    CO2 23 22 - 29 mmol/L    Anion Gap 11 7 - 16 mmol/L    Glucose 100 (H) 74 - 99 mg/dL    BUN 13 6 - 20 mg/dL    CREATININE 0.9 0.5 - 1.0 mg/dL    GFR Non-African American >60 >=60 mL/min/1.73    GFR African American >60     Calcium 9.7 8.6 - 10.2 mg/dL    Total Protein 7.0 6.4 - 8.3 g/dL    Albumin 4.4 3.5 - 5.2 g/dL    Total Bilirubin <0.2 0.0 - 1.2 mg/dL    Alkaline Phosphatase 61 35 - 104 U/L    ALT 14 0 - 32 U/L    AST 19 0 - 31 U/L   POC Pregnancy Urine Qual   Result Value Ref Range    HCG, Urine, POC Negative Negative    Lot Number [id number]     Positive QC Pass/Fail Pass     Negative QC Pass/Fail Pass    EKG 12 Lead   Result Value Ref Range    Ventricular Rate 96 BPM    Atrial Rate 96 BPM    P-R Interval 148 ms    QRS Duration 88 ms    Q-T Interval 356 ms    QTc Calculation (Bazett) 449 ms    P Axis 37 degrees    R Axis 7 degrees    T Axis 35 degrees       Radiology:  CTA CHEST W CONTRAST   Final Result   No evidence of pulmonary embolism. Very small focal infiltrate within the right upper lobe just cephalad to the   minor fissure, most compatible with early lobar pneumonia.             ------------------------- NURSING NOTES AND VITALS REVIEWED ---------------------------  Date / Time Roomed:  10/31/2021  6:42 PM  ED Bed Assignment:  12/12    The nursing notes within the ED encounter and vital signs as below have been reviewed. /78   Pulse 96   Temp 98.9 °F (37.2 °C)   Resp 16   Wt 169 lb (76.7 kg)   LMP 10/17/2021   SpO2 98%   BMI 29.01 kg/m²   Oxygen Saturation Interpretation: Normal      ------------------------------------------ PROGRESS NOTES ------------------------------------------  I have spoken with the patient and discussed todays results, in addition to providing specific details for the plan of care and counseling regarding the diagnosis and prognosis. Their questions are answered at this time and they are agreeable with the plan. I discussed at length with them reasons for immediate return here for re evaluation. They will followup with primary care by calling their office tomorrow.       --------------------------------- ADDITIONAL PROVIDER NOTES ---------------------------------  At this time the patient is without objective evidence of an acute process requiring hospitalization or inpatient management. They have remained hemodynamically stable throughout their entire ED visit and are stable for discharge with outpatient follow-up. The plan has been discussed in detail and they are aware of the specific conditions for emergent return, as well as the importance of follow-up. New Prescriptions    DOXYCYCLINE HYCLATE (VIBRA-TABS) 100 MG TABLET    Take 1 tablet by mouth 2 times daily for 10 days       Diagnosis:  1. Moderate asthma with exacerbation, unspecified whether persistent    2. Pneumonia due to infectious organism, unspecified laterality, unspecified part of lung        Disposition:  Patient's disposition: Discharge to home  Patient's condition is stable.            Valentino Haywood DO  10/31/21 3567

## 2021-11-01 DIAGNOSIS — R06.02 SOB (SHORTNESS OF BREATH): ICD-10-CM

## 2021-11-01 DIAGNOSIS — U09.9 POST-COVID SYNDROME: ICD-10-CM

## 2021-11-01 LAB
EKG ATRIAL RATE: 96 BPM
EKG P AXIS: 37 DEGREES
EKG P-R INTERVAL: 148 MS
EKG Q-T INTERVAL: 356 MS
EKG QRS DURATION: 88 MS
EKG QTC CALCULATION (BAZETT): 449 MS
EKG R AXIS: 7 DEGREES
EKG T AXIS: 35 DEGREES
EKG VENTRICULAR RATE: 96 BPM

## 2021-11-01 PROCEDURE — 93010 ELECTROCARDIOGRAM REPORT: CPT | Performed by: INTERNAL MEDICINE

## 2021-11-01 RX ORDER — FLUTICASONE PROPIONATE 110 UG/1
1 AEROSOL, METERED RESPIRATORY (INHALATION) 2 TIMES DAILY
Qty: 1 EACH | Refills: 2 | Status: SHIPPED
Start: 2021-11-01 | End: 2022-01-26 | Stop reason: SDUPTHER

## 2021-11-01 RX ORDER — ALBUTEROL SULFATE 90 UG/1
2 AEROSOL, METERED RESPIRATORY (INHALATION) EVERY 6 HOURS PRN
Qty: 1 EACH | Refills: 2 | Status: SHIPPED | OUTPATIENT
Start: 2021-11-01

## 2021-12-27 ENCOUNTER — APPOINTMENT (OUTPATIENT)
Dept: GENERAL RADIOLOGY | Age: 21
End: 2021-12-27
Payer: COMMERCIAL

## 2021-12-27 ENCOUNTER — NURSE TRIAGE (OUTPATIENT)
Dept: OTHER | Facility: CLINIC | Age: 21
End: 2021-12-27

## 2021-12-27 ENCOUNTER — HOSPITAL ENCOUNTER (EMERGENCY)
Age: 21
Discharge: HOME | End: 2021-12-27
Attending: STUDENT IN AN ORGANIZED HEALTH CARE EDUCATION/TRAINING PROGRAM
Payer: COMMERCIAL

## 2021-12-27 VITALS — HEART RATE: 84 BPM | OXYGEN SATURATION: 100 % | TEMPERATURE: 97.5 F

## 2021-12-27 DIAGNOSIS — J02.9 VIRAL PHARYNGITIS: Primary | ICD-10-CM

## 2021-12-27 DIAGNOSIS — G43.009 MIGRAINE WITHOUT AURA AND WITHOUT STATUS MIGRAINOSUS, NOT INTRACTABLE: ICD-10-CM

## 2021-12-27 DIAGNOSIS — R11.0 NAUSEA: ICD-10-CM

## 2021-12-27 LAB
SARS-COV-2, NAAT: NOT DETECTED
STREP GRP A PCR: NEGATIVE

## 2021-12-27 PROCEDURE — 87635 SARS-COV-2 COVID-19 AMP PRB: CPT

## 2021-12-27 PROCEDURE — 71046 X-RAY EXAM CHEST 2 VIEWS: CPT

## 2021-12-27 PROCEDURE — 99283 EMERGENCY DEPT VISIT LOW MDM: CPT

## 2021-12-27 PROCEDURE — 87880 STREP A ASSAY W/OPTIC: CPT

## 2021-12-27 RX ORDER — DEXAMETHASONE 6 MG/1
6 TABLET ORAL EVERY 12 HOURS SCHEDULED
Status: DISCONTINUED | OUTPATIENT
Start: 2021-12-27 | End: 2021-12-27

## 2021-12-27 RX ORDER — DEXAMETHASONE 6 MG/1
6 TABLET ORAL ONCE
Qty: 1 TABLET | Refills: 0 | Status: SHIPPED | OUTPATIENT
Start: 2021-12-27 | End: 2021-12-27

## 2021-12-27 RX ORDER — DEXAMETHASONE 6 MG/1
TABLET ORAL
Status: DISCONTINUED
Start: 2021-12-27 | End: 2021-12-27 | Stop reason: HOSPADM

## 2021-12-27 RX ORDER — ONDANSETRON 4 MG/1
4 TABLET, ORALLY DISINTEGRATING ORAL 3 TIMES DAILY PRN
Qty: 21 TABLET | Refills: 0 | Status: SHIPPED | OUTPATIENT
Start: 2021-12-27 | End: 2022-07-07

## 2021-12-27 NOTE — ED PROVIDER NOTES
ED  Provider Note  Admit Date/RoomTime: 12/27/2021  4:39 PM  ED Room: -[facility]      History of Present Illness:  12/27/21, Time: 4:55 PM EST  Chief Complaint   Patient presents with    Abdominal Pain     All symptoms started last nights.  Pharyngitis    Headache         Sharona Amezcua is a 24 y.o. female presenting to the ED for headache, sore throat, abdominal pain  Onset: Gradual  Timing: Constant  Duration: Days  Associated symptoms: Patient states that on 12/23, 4 days ago, she began to have a migraine headache consistent with her prior migraines, no neck pain or stiffness, no vision changes. She states that it has been moderately relieved with ibuprofen versus Excedrin Migraine. However, her sore throat was progressively worsening, especially today. She was sent by PCP office for evaluation for Covid and strep throat. She has had a dry cough. No neck swelling. No hearing changes ear pain or ear drainage. Nausea without vomiting. She also states that her stomach has been hurting intermittently, but she feels that this is connected to taking ibuprofen, as has happened in the past.  No diarrhea or constipation. Severity: Moderate  Exacerbated by: None  Relieved by: Moderately relieved, as above with OTC medications. Patient does not take a daily migraine medication anymore. She is unsure which medication she was on previously. No ataxia, no focal weakness or numbness.         Review of Systems:   A complete review of systems was performed and pertinent positives and negatives are stated within HPI, all other systems reviewed and are negative.        --------------------------------------------- PATIENT HISTORY--------------------------------------------  Past Medical History:  has a past medical history of Akathisia, Anxiety and depression, Chronic daily headache, Constipation, Constipation, chronic, Depression, Hallucination, Motor restlessness, Muscle contraction headache, Neurosis, anxiety, generalized, Periodic headache syndrome, Schizophrenia ([facility] [address] Tourette's, and Vitamin D deficiency. Past Surgical History:  has a past surgical history that includes Myringotomy Tympanostomy Tube Placement; fracture surgery (july 2014); and Ankle surgery. Family History: family history includes Diabetes in her maternal grandfather and maternal grandmother; High Blood Pressure in her maternal grandfather and maternal grandmother; Kidney Disease in her maternal grandfather; Migraines in her mother; Stroke in her maternal grandmother. . Unless otherwise noted, family history is non contributory    Social History:  reports that she has never smoked. She has never used smokeless tobacco. She reports that she does not drink alcohol and does not use drugs. The patients home medications have been reviewed. Allergies: Topiramate    I have reviewed the past medical history, past surgical history, social history, and family history    ---------------------------------------------------PHYSICAL EXAM--------------------------------------    Constitutional/General: Alert and oriented x3  Head: Normocephalic and atraumatic  Eyes: PERRL, EOMI, sclera non icteric  ENT: Oropharynx clear, handling secretions, no trismus  Neck: Supple, full ROM, no stridor, no meningismus  Respiratory: lctab  Cardiovascular: RRR, no R/G/M, 2+ peripheral pulses  Chest: No chest wall tenderness, equal chest rise  Gastrointestinal:  S/nt/nd  Musculoskeletal: Extremities warm and well perfused, moving all extremities  Skin: skin warm and dry. No rashes. Neurologic: No focal deficits, strength and sensation grossly intact   Psychiatric: Normal Affect, behavior normal           -------------------------------------------------- RESULTS -------------------------------------------------  I have personally reviewed all laboratory and imaging results for this patient. Results are listed below.      LABS: (Lab results interpreted by me)  Results for orders placed or performed during the hospital encounter of 12/27/21   Strep screen group a throat    Specimen: Throat   Result Value Ref Range    Strep Grp A PCR Negative Negative   COVID-19, Rapid    Specimen: Nasopharyngeal Swab   Result Value Ref Range    SARS-CoV-2, NAAT Not Detected Not Detected   ,         ------------------------- NURSING NOTES AND VITALS REVIEWED ---------------------------  The nursing notes within the ED encounter and vital signs as below have been reviewed by myself  Pulse 84   Temp 97.5 °F (36.4 °C) (Tympanic)   LMP 12/01/2021   SpO2 100%      The patients available past medical records and past encounters were reviewed. ------------------------------ ED COURSE/MEDICAL DECISION MAKING----------------------  Medications - No data to display    I, Dr. Orinda Cabot, am the primary provider of record    Medical Decision Making:   Acute pharyngitis. Strep and COVID negative. Patient is overall uncomfortable but not septic or toxic in appearance. She is saturating well on room air, not tachypneic, not tachycardic, in no acute distress. Will give dose of Decadron here and discharged with Zofran. As for headache, no red flag signs, consistent with her prior headache,. return precautions. ED Counseling: This emergency provider has spoken with the patient and any family present to discuss clinical status, results, plan of care, diagnosis and prognosis as able to be determined at this time. Any questions were answered and patient and/or family/POA are agreeable with the plan.       --------------------------------- IMPRESSION AND DISPOSITION ---------------------------------    IMPRESSION  1. Viral pharyngitis    2. Nausea    3. Migraine without aura and without status migrainosus, not intractable        DISPOSITION  Disposition: Discharge to home  Patient condition is good      This report was transcribed using voice recognition software.  Every effort was made to ensure accuracy; however, transcription errors may be present.          Mari Donovan MD  12/27/21 9835

## 2021-12-27 NOTE — ED TRIAGE NOTES
Department of Emergency Medicine  FIRST PROVIDER TRIAGE NOTE             Independent MLP           12/27/21  2:57 PM EST    Date of Encounter: 12/27/21   MRN: [id number]      HPI: Kenroy Aguillon is a 24 y.o. female who presents to the ED for headache, pharyngitis, cough x 12/23/21. ROS: Negative for cp, sob or fever. PE: Gen Appearance/Constitutional: alert  CV: regular rate  Pulm: CTA bilat    Vitals:    12/27/21 1256   Pulse: 84   Temp: 97.5 °F (36.4 °C)   SpO2: 100%       Past medical history, surgical history, and medications reviewed. Initial Plan of Care: All treatment areas within department are currently occupied. Plan to order/initiate the following while awaiting opening in ED: labs. Initiate treatment/testing, proceed to treatment area when bed available for ED Attending/MLP to continue care.     Electronically signed by TRISH Messina CNP   DD: 12/27/21

## 2021-12-27 NOTE — TELEPHONE ENCOUNTER
Received call from [address] at [facility] with Red Flag Complaint. Subjective: Caller states \"I am having a severe headache and sore throat\"     Current Symptoms: Sick with sore throat since 12/23/21 and some cough, Hx: severe headaches. Previously has seen a neurologist, has not been able to see in a while due to covid and school. 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 Onset: 4 days ago; gradual    Associated Symptoms: reduced activity, drinking is ok, reduced appetite having bland foods and some nausea    Pain Severity: 8/10; numbness; waxing and waning    Temperature:  Not running fever, but feels hot    What has been tried: Tylenol, Ibuprofen, Execedrine    LMP: 12/1/21 Pregnant: No    Recommended disposition: Go to  now. Pt would like to be seen quickly to get more prompt relief of severe headache, confirmed UC disposition. Care advice provided, patient verbalizes understanding; denies any other questions or concerns; instructed to call back for any new or worsening symptoms. Patient/caller proceeding to nearest [facility]      Attention Provider: Thank you for allowing me to participate in the care of your patient. The patient was connected to triage in response to information provided to the ECC/PSC. Please do not respond through this encounter as the response is not directed to a shared pool.       Reason for Disposition   SEVERE headache, states 'worst headache' of life    Protocols used: HEADACHE-ADULT-[place]

## 2021-12-30 ENCOUNTER — HOSPITAL ENCOUNTER (EMERGENCY)
Age: 21
Discharge: HOME | End: 2021-12-30
Attending: EMERGENCY MEDICINE
Payer: COMMERCIAL

## 2021-12-30 VITALS
BODY MASS INDEX: 28.49 KG/M2 | SYSTOLIC BLOOD PRESSURE: 103 MMHG | RESPIRATION RATE: 14 BRPM | OXYGEN SATURATION: 99 % | WEIGHT: 166 LBS | HEART RATE: 64 BPM | TEMPERATURE: 98.3 F | DIASTOLIC BLOOD PRESSURE: 80 MMHG

## 2021-12-30 DIAGNOSIS — G44.209 TENSION HEADACHE: Primary | ICD-10-CM

## 2021-12-30 LAB
BACTERIA: ABNORMAL /HPF
BILIRUBIN URINE: NEGATIVE
BLOOD, URINE: ABNORMAL
CLARITY: ABNORMAL
COLOR: YELLOW
EPITHELIAL CELLS, UA: ABNORMAL /HPF
GLUCOSE URINE: NEGATIVE MG/DL
HCG, URINE, POC: NEGATIVE
INFLUENZA A BY PCR: NOT DETECTED
INFLUENZA B BY PCR: NOT DETECTED
KETONES, URINE: NEGATIVE MG/DL
LEUKOCYTE ESTERASE, URINE: NEGATIVE
Lab: NORMAL
NEGATIVE QC PASS/FAIL: NORMAL
NITRITE, URINE: NEGATIVE
PH UA: 6 (ref 5–9)
POSITIVE QC PASS/FAIL: NORMAL
PROTEIN UA: 30 MG/DL
RBC UA: >20 /HPF (ref 0–2)
SPECIFIC GRAVITY UA: >=1.03 (ref 1–1.03)
UROBILINOGEN, URINE: 1 E.U./DL
WBC UA: ABNORMAL /HPF (ref 0–5)

## 2021-12-30 PROCEDURE — 2580000003 HC RX 258: Performed by: EMERGENCY MEDICINE

## 2021-12-30 PROCEDURE — 96374 THER/PROPH/DIAG INJ IV PUSH: CPT

## 2021-12-30 PROCEDURE — 87502 INFLUENZA DNA AMP PROBE: CPT

## 2021-12-30 PROCEDURE — 99284 EMERGENCY DEPT VISIT MOD MDM: CPT

## 2021-12-30 PROCEDURE — 81001 URINALYSIS AUTO W/SCOPE: CPT

## 2021-12-30 PROCEDURE — 96372 THER/PROPH/DIAG INJ SC/IM: CPT

## 2021-12-30 PROCEDURE — 6360000002 HC RX W HCPCS: Performed by: EMERGENCY MEDICINE

## 2021-12-30 PROCEDURE — 96375 TX/PRO/DX INJ NEW DRUG ADDON: CPT

## 2021-12-30 RX ORDER — DIPHENHYDRAMINE HYDROCHLORIDE 50 MG/ML
25 INJECTION INTRAMUSCULAR; INTRAVENOUS ONCE
Status: COMPLETED | OUTPATIENT
Start: 2021-12-30 | End: 2021-12-30

## 2021-12-30 RX ORDER — ORPHENADRINE CITRATE 30 MG/ML
60 INJECTION INTRAMUSCULAR; INTRAVENOUS ONCE
Status: COMPLETED | OUTPATIENT
Start: 2021-12-30 | End: 2021-12-30

## 2021-12-30 RX ORDER — TIZANIDINE 4 MG/1
4 TABLET ORAL EVERY 8 HOURS PRN
Qty: 10 TABLET | Refills: 0 | Status: SHIPPED | OUTPATIENT
Start: 2021-12-30 | End: 2022-07-07

## 2021-12-30 RX ORDER — 0.9 % SODIUM CHLORIDE 0.9 %
1000 INTRAVENOUS SOLUTION INTRAVENOUS ONCE
Status: COMPLETED | OUTPATIENT
Start: 2021-12-30 | End: 2021-12-30

## 2021-12-30 RX ORDER — KETOROLAC TROMETHAMINE 15 MG/ML
15 INJECTION, SOLUTION INTRAMUSCULAR; INTRAVENOUS ONCE
Status: COMPLETED | OUTPATIENT
Start: 2021-12-30 | End: 2021-12-30

## 2021-12-30 RX ADMIN — DIPHENHYDRAMINE HYDROCHLORIDE 25 MG: 50 INJECTION, SOLUTION INTRAMUSCULAR; INTRAVENOUS at 22:51

## 2021-12-30 RX ADMIN — ORPHENADRINE CITRATE 60 MG: 60 INJECTION INTRAMUSCULAR; INTRAVENOUS at 22:59

## 2021-12-30 RX ADMIN — SODIUM CHLORIDE 1000 ML: 9 INJECTION, SOLUTION INTRAVENOUS at 22:57

## 2021-12-30 RX ADMIN — KETOROLAC TROMETHAMINE 15 MG: 15 INJECTION, SOLUTION INTRAMUSCULAR; INTRAVENOUS at 22:55

## 2021-12-30 ASSESSMENT — ENCOUNTER SYMPTOMS
BACK PAIN: 0
COUGH: 0
ABDOMINAL DISTENTION: 0
EYE REDNESS: 0
EYE DISCHARGE: 0
SHORTNESS OF BREATH: 0
NAUSEA: 0
EYE PAIN: 0
WHEEZING: 0
VOMITING: 0
DIARRHEA: 0

## 2021-12-30 ASSESSMENT — PAIN SCALES - GENERAL
PAINLEVEL_OUTOF10: 10
PAINLEVEL_OUTOF10: 6
PAINLEVEL_OUTOF10: 8

## 2021-12-30 ASSESSMENT — PAIN DESCRIPTION - LOCATION: LOCATION: HEAD

## 2021-12-31 NOTE — ED PROVIDER NOTES
Patient with viral-like symptoms including sinus pressure, runny nose cough and congestion. She was seen here on 1227 for the same complaint. Covid testing at that time was negative. She denies any fever. She reports some myalgia and posterior cervical pain radiating up into her head causing headache. She denies any trauma. She states her headache is unchanged since her recent visit. The history is provided by the patient. Headache  Pain location:  Occipital  Quality:  Dull  Severity currently:  4/10  Severity at highest:  6/10  Onset quality:  Gradual  Duration:  1 week  Timing:  Intermittent  Progression:  Waxing and waning  Chronicity:  New  Similar to prior headaches: yes    Relieved by:  None tried  Worsened by:  Nothing  Ineffective treatments:  None tried  Associated symptoms: congestion, drainage and fatigue    Associated symptoms: no back pain, no cough, no diarrhea, no ear pain, no eye pain, no fever, no nausea, no vomiting and no weakness         Review of Systems   Constitutional: Positive for fatigue. Negative for activity change, appetite change, chills and fever. HENT: Positive for congestion and postnasal drip. Negative for ear pain. Eyes: Negative for pain, discharge and redness. Respiratory: Negative for cough, shortness of breath and wheezing. Cardiovascular: Negative for chest pain. Gastrointestinal: Negative for abdominal distention, diarrhea, nausea and vomiting. Genitourinary: Negative for dysuria and frequency. Musculoskeletal: Negative for arthralgias and back pain. Skin: Negative for rash and wound. Neurological: Positive for headaches. Negative for weakness. Hematological: Negative for adenopathy. All other systems reviewed and are negative. Physical Exam  Vitals and nursing note reviewed. Constitutional:       Appearance: She is well-developed. HENT:      Head: Normocephalic and atraumatic.    Eyes:      Pupils: Pupils are equal, round, and reactive to light. Neck:      Vascular: No carotid bruit. Comments: Tenderness to bilateral paraspinal cervical muscles. Most notably tender at the superior portion of the trapezius bilaterally. Palpation here reproduces her headache. No lymphadenopathy is detected. Cardiovascular:      Rate and Rhythm: Normal rate and regular rhythm. Heart sounds: Normal heart sounds. No murmur heard. Pulmonary:      Effort: Pulmonary effort is normal. No respiratory distress. Breath sounds: Normal breath sounds. No wheezing or rales. Abdominal:      General: Bowel sounds are normal.      Palpations: Abdomen is soft. Tenderness: There is no abdominal tenderness. There is no guarding or rebound. Musculoskeletal:      Cervical back: Normal range of motion and neck supple. Tenderness present. No rigidity. Lymphadenopathy:      Cervical: No cervical adenopathy. Skin:     General: Skin is warm and dry. Neurological:      Mental Status: She is alert and oriented to person, place, and time. Cranial Nerves: No cranial nerve deficit. Coordination: Coordination normal.          Procedures     MDM     11:22 PM EST  Patient states her head pain is gone. She feels much better. We discussed the need for close outpatient follow-up.           --------------------------------------------- PAST HISTORY ---------------------------------------------  Past Medical History:  has a past medical history of Akathisia, Anxiety and depression, Chronic daily headache, Constipation, Constipation, chronic, Depression, Hallucination, Motor restlessness, Muscle contraction headache, Neurosis, anxiety, generalized, Periodic headache syndrome, [facility] Tourette's, and Vitamin D deficiency. Past Surgical History:  has a past surgical history that includes Myringotomy Tympanostomy Tube Placement; fracture surgery (july 2014); and Ankle surgery. Social History:  reports that she has never smoked. She has never used smokeless tobacco. She reports that she does not drink alcohol and does not use drugs. Family History: family history includes Diabetes in her maternal grandfather and maternal grandmother; High Blood Pressure in her maternal grandfather and maternal grandmother; Kidney Disease in her maternal grandfather; Migraines in her mother; Stroke in her maternal grandmother. The patients home medications have been reviewed.     Allergies: Topiramate    -------------------------------------------------- RESULTS -------------------------------------------------  Labs:  Results for orders placed or performed during the hospital encounter of 12/30/21   Rapid influenza A/B antigens    Specimen: Nasopharyngeal   Result Value Ref Range    Influenza A by PCR Not Detected Not Detected    Influenza B by PCR Not Detected Not Detected   Urinalysis   Result Value Ref Range    Color, UA Yellow Straw/Yellow    Clarity, UA SLCLOUDY Clear    Glucose, Ur Negative Negative mg/dL    Bilirubin Urine Negative Negative    Ketones, Urine Negative Negative mg/dL    Specific Gravity, UA >=1.030 1.005 - 1.030    Blood, Urine LARGE (A) Negative    pH, UA 6.0 5.0 - 9.0    Protein, UA 30 (A) Negative mg/dL    Urobilinogen, Urine 1.0 <2.0 E.U./dL    Nitrite, Urine Negative Negative    Leukocyte Esterase, Urine Negative Negative   Microscopic Urinalysis   Result Value Ref Range    WBC, UA 0-1 0 - 5 /HPF    RBC, UA >20 0 - 2 /HPF    Epithelial Cells, UA FEW /HPF    Bacteria, UA FEW (A) None Seen /HPF   POC Pregnancy Urine   Result Value Ref Range    HCG, Urine, POC Negative Negative    Lot Number [id number]     Positive QC Pass/Fail Pass     Negative QC Pass/Fail Pass        Radiology:  No orders to display       ------------------------- NURSING NOTES AND VITALS REVIEWED ---------------------------  Date / Time Roomed:  12/30/2021  9:33 PM  ED Bed Assignment:  13/13    The nursing notes within the ED encounter and vital signs as below have been reviewed. /80   Pulse 64   Temp 98.3 °F (36.8 °C) (Oral)   Resp 14   Wt 166 lb (75.3 kg)   LMP 12/30/2021   SpO2 99%   BMI 28.49 kg/m²   Oxygen Saturation Interpretation: Normal      ------------------------------------------ PROGRESS NOTES ------------------------------------------  11:23 PM EST  I have spoken with the patient and discussed todays results, in addition to providing specific details for the plan of care and counseling regarding the diagnosis and prognosis. Their questions are answered at this time and they are agreeable with the plan. I discussed at length with them reasons for immediate return here for re evaluation. They will followup with their primary care physician by calling their office tomorrow. --------------------------------- ADDITIONAL PROVIDER NOTES ---------------------------------  At this time the patient is without objective evidence of an acute process requiring hospitalization or inpatient management. They have remained hemodynamically stable throughout their entire ED visit and are stable for discharge with outpatient follow-up. The plan has been discussed in detail and they are aware of the specific conditions for emergent return, as well as the importance of follow-up. New Prescriptions    TIZANIDINE (ZANAFLEX) 4 MG TABLET    Take 1 tablet by mouth every 8 hours as needed (neck pain)       Diagnosis:  1. Tension headache        Disposition:  Patient's disposition: Discharge to home  Patient's condition is stable.        Pallavi Guerrero, [place]  12/30/21 5551

## 2021-12-31 NOTE — ED NOTES
Department of Emergency Medicine  FIRST PROVIDER TRIAGE NOTE             Independent MLP           12/30/21  9:31 PM EST    Date of Encounter: 12/30/21   MRN: [id number]      HPI: Fabiana Gomez is a 24 y.o. female who presents to the ED for Migraine (Pt seen here on monday, no relief. Started 7 days ago. Pain 10/10. ) and Neck Pain      ROS: Negative for cp or sob. PE: Gen Appearance/Constitutional: alert  Musculoskeletal: moves all extremities x 4     Initial Plan of Care: All treatment areas with department are currently occupied. Plan to order/Initiate the following while awaiting opening in ED: labs.     Initial Plan of Care: Initiate Treatment-Testing, Proceed toTreatment Area When Bed Available for ED Attending/MLP to Continue Care    Electronically signed by Helga Schuler PA-C   DD: 12/30/21         Helga Schuler PA-C  12/30/21 [id number]

## 2022-01-26 ENCOUNTER — OFFICE VISIT (OUTPATIENT)
Dept: FAMILY MEDICINE CLINIC | Age: 22
End: 2022-01-26
Payer: COMMERCIAL

## 2022-01-26 VITALS
BODY MASS INDEX: 27.96 KG/M2 | HEART RATE: 66 BPM | WEIGHT: 163.8 LBS | SYSTOLIC BLOOD PRESSURE: 130 MMHG | RESPIRATION RATE: 16 BRPM | OXYGEN SATURATION: 96 % | HEIGHT: 64 IN | TEMPERATURE: 96.4 F | DIASTOLIC BLOOD PRESSURE: 82 MMHG

## 2022-01-26 DIAGNOSIS — U09.9 POST-COVID SYNDROME: ICD-10-CM

## 2022-01-26 DIAGNOSIS — G43.009 MIGRAINE WITHOUT AURA AND WITHOUT STATUS MIGRAINOSUS, NOT INTRACTABLE: ICD-10-CM

## 2022-01-26 DIAGNOSIS — R56.9 SEIZURE-LIKE ACTIVITY (HCC): Primary | ICD-10-CM

## 2022-01-26 DIAGNOSIS — R06.02 SOB (SHORTNESS OF BREATH): ICD-10-CM

## 2022-01-26 PROBLEM — J45.901 MODERATE ASTHMA WITH EXACERBATION, UNSPECIFIED WHETHER PERSISTENT: Status: RESOLVED | Noted: 2022-01-26 | Resolved: 2022-01-26

## 2022-01-26 PROBLEM — J45.901 MODERATE ASTHMA WITH EXACERBATION, UNSPECIFIED WHETHER PERSISTENT: Status: ACTIVE | Noted: 2022-01-26

## 2022-01-26 PROCEDURE — 99213 OFFICE O/P EST LOW 20 MIN: CPT | Performed by: NURSE PRACTITIONER

## 2022-01-26 PROCEDURE — G8427 DOCREV CUR MEDS BY ELIG CLIN: HCPCS | Performed by: NURSE PRACTITIONER

## 2022-01-26 PROCEDURE — G8484 FLU IMMUNIZE NO ADMIN: HCPCS | Performed by: NURSE PRACTITIONER

## 2022-01-26 PROCEDURE — 1036F TOBACCO NON-USER: CPT | Performed by: NURSE PRACTITIONER

## 2022-01-26 PROCEDURE — G8419 CALC BMI OUT NRM PARAM NOF/U: HCPCS | Performed by: NURSE PRACTITIONER

## 2022-01-26 RX ORDER — IBUPROFEN 600 MG/1
600 TABLET ORAL EVERY 6 HOURS PRN
Qty: 20 TABLET | Refills: 0 | Status: SHIPPED | OUTPATIENT
Start: 2022-01-26 | End: 2022-07-07

## 2022-01-26 RX ORDER — FLUTICASONE PROPIONATE 110 UG/1
1 AEROSOL, METERED RESPIRATORY (INHALATION) 2 TIMES DAILY
Qty: 1 EACH | Refills: 2 | Status: SHIPPED | OUTPATIENT
Start: 2022-01-26

## 2022-01-26 ASSESSMENT — PATIENT HEALTH QUESTIONNAIRE - PHQ9
SUM OF ALL RESPONSES TO PHQ QUESTIONS 1-9: 0
1. LITTLE INTEREST OR PLEASURE IN DOING THINGS: 0
SUM OF ALL RESPONSES TO PHQ QUESTIONS 1-9: 0
5. POOR APPETITE OR OVEREATING: 0
4. FEELING TIRED OR HAVING LITTLE ENERGY: 0
SUM OF ALL RESPONSES TO PHQ QUESTIONS 1-9: 0
8. MOVING OR SPEAKING SO SLOWLY THAT OTHER PEOPLE COULD HAVE NOTICED. OR THE OPPOSITE, BEING SO FIGETY OR RESTLESS THAT YOU HAVE BEEN MOVING AROUND A LOT MORE THAN USUAL: 0
SUM OF ALL RESPONSES TO PHQ9 QUESTIONS 1 & 2: 0
7. TROUBLE CONCENTRATING ON THINGS, SUCH AS READING THE NEWSPAPER OR WATCHING TELEVISION: 0
9. THOUGHTS THAT YOU WOULD BE BETTER OFF DEAD, OR OF HURTING YOURSELF: 0
6. FEELING BAD ABOUT YOURSELF - OR THAT YOU ARE A FAILURE OR HAVE LET YOURSELF OR YOUR FAMILY DOWN: 0
3. TROUBLE FALLING OR STAYING ASLEEP: 0
2. FEELING DOWN, DEPRESSED OR HOPELESS: 0
SUM OF ALL RESPONSES TO PHQ QUESTIONS 1-9: 0

## 2022-01-26 ASSESSMENT — ENCOUNTER SYMPTOMS
NAUSEA: 0
CONSTIPATION: 0
VOMITING: 0
COUGH: 0
SHORTNESS OF BREATH: 0
WHEEZING: 0
DIARRHEA: 0
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
## 2022-02-25 RX ORDER — LEVONORGESTREL AND ETHINYL ESTRADIOL 0.15-0.03
KIT ORAL
Qty: 1 PACKET | Refills: 0 | Status: SHIPPED | OUTPATIENT
Start: 2022-02-25

## 2022-02-25 NOTE — TELEPHONE ENCOUNTER
Usually gets from ob/gyn, but she can't get a hold of them and she ran out of the birth control   Advised to reach back out to ob/gyn for further refills.

## 2022-03-09 ENCOUNTER — OFFICE VISIT (OUTPATIENT)
Dept: NEUROLOGY | Age: 22
End: 2022-03-09
Payer: COMMERCIAL

## 2022-03-09 VITALS
TEMPERATURE: 97.9 F | HEIGHT: 64 IN | OXYGEN SATURATION: 99 % | SYSTOLIC BLOOD PRESSURE: 119 MMHG | HEART RATE: 67 BPM | DIASTOLIC BLOOD PRESSURE: 79 MMHG | BODY MASS INDEX: 28.17 KG/M2 | WEIGHT: 165 LBS

## 2022-03-09 DIAGNOSIS — G43.709 CHRONIC MIGRAINE WITHOUT AURA WITHOUT STATUS MIGRAINOSUS, NOT INTRACTABLE: ICD-10-CM

## 2022-03-09 DIAGNOSIS — R41.89 UNAWARENESS: ICD-10-CM

## 2022-03-09 DIAGNOSIS — R56.9 SEIZURE-LIKE ACTIVITY (HCC): Primary | ICD-10-CM

## 2022-03-09 PROCEDURE — G8427 DOCREV CUR MEDS BY ELIG CLIN: HCPCS | Performed by: NURSE PRACTITIONER

## 2022-03-09 PROCEDURE — G8484 FLU IMMUNIZE NO ADMIN: HCPCS | Performed by: NURSE PRACTITIONER

## 2022-03-09 PROCEDURE — 99204 OFFICE O/P NEW MOD 45 MIN: CPT | Performed by: NURSE PRACTITIONER

## 2022-03-09 PROCEDURE — G8419 CALC BMI OUT NRM PARAM NOF/U: HCPCS | Performed by: NURSE PRACTITIONER

## 2022-03-09 PROCEDURE — 1036F TOBACCO NON-USER: CPT | Performed by: NURSE PRACTITIONER

## 2022-03-09 RX ORDER — RIZATRIPTAN BENZOATE 10 MG/1
10 TABLET ORAL
Qty: 9 TABLET | Refills: 2 | Status: SHIPPED
Start: 2022-03-09 | End: 2022-06-29 | Stop reason: SDUPTHER
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
## 2022-03-15 ENCOUNTER — TELEPHONE (OUTPATIENT)
Dept: NEUROLOGY | Age: 22
End: 2022-03-15

## 2022-03-15 NOTE — TELEPHONE ENCOUNTER
Aimovig 70 mg denied. Please advise.   Electronically signed by Renny Teresa MA on 3/15/22 at 3:03 PM EDT

## 2022-03-15 NOTE — TELEPHONE ENCOUNTER
Appeal letter resent per George Ramey.     Electronically signed by Maged Roca MA on 3/15/22 at 3:49 PM EDT

## 2022-03-28 ENCOUNTER — HOSPITAL ENCOUNTER (OUTPATIENT)
Dept: NEUROLOGY | Age: 22
Discharge: HOME | End: 2022-03-28
Payer: COMMERCIAL

## 2022-03-28 DIAGNOSIS — R56.9 SEIZURE-LIKE ACTIVITY (HCC): ICD-10-CM

## 2022-03-28 PROCEDURE — 95714 VEEG EA 12-26 HR UNMNTR: CPT

## 2022-03-30 ENCOUNTER — HOSPITAL ENCOUNTER (OUTPATIENT)
Dept: NEUROLOGY | Age: 22
Discharge: HOME | End: 2022-03-30

## 2022-05-05 ENCOUNTER — PROCEDURE VISIT (OUTPATIENT)
Dept: NEUROLOGY | Age: 22
End: 2022-05-05
Payer: COMMERCIAL

## 2022-05-05 DIAGNOSIS — G25.71 AKATHISIA: Primary | ICD-10-CM

## 2022-05-05 PROCEDURE — 95721 EEG PHY/QHP>36<60 HR W/O VID: CPT | Performed by: PSYCHIATRY & NEUROLOGY

## 2022-05-05 NOTE — PROGRESS NOTES
Ambulatory EEG report    This 25-year-old woman, on erenumab, rizatriptan, tizanidine, fluticasone, oral contraceptives, omeprazole, quetiapine and inhalers, displayed the following underlying rhythms---well-organized, synchronous, 11 Hz, 20 to 60 µV alpha rhythms in both posterior regions. 16 Hz, 10 µV beta rhythms were noted in both precentral regions. There was symmetrical attenuation of the posterior alpha rhythms with eye opening. The patient did fall asleep, progressing uneventfully through all stages of somnolence. Despite moderate eye movement, muscle and chewing artifacts, there were no focal abnormalities or epileptiform discharges.     Impression--- normal 48-hour ambulatory EEG

## 2022-06-29 ENCOUNTER — OFFICE VISIT (OUTPATIENT)
Dept: NEUROLOGY | Age: 22
End: 2022-06-29
Payer: COMMERCIAL

## 2022-06-29 ENCOUNTER — TELEPHONE (OUTPATIENT)
Dept: NEUROLOGY | Age: 22
End: 2022-06-29

## 2022-06-29 ENCOUNTER — TELEPHONE (OUTPATIENT)
Dept: ADMINISTRATIVE | Age: 22
End: 2022-06-29

## 2022-06-29 VITALS
OXYGEN SATURATION: 98 % | TEMPERATURE: 97.9 F | HEART RATE: 84 BPM | DIASTOLIC BLOOD PRESSURE: 71 MMHG | SYSTOLIC BLOOD PRESSURE: 106 MMHG

## 2022-06-29 DIAGNOSIS — R55 SYNCOPE, UNSPECIFIED SYNCOPE TYPE: Primary | ICD-10-CM

## 2022-06-29 DIAGNOSIS — G43.709 CHRONIC MIGRAINE WITHOUT AURA WITHOUT STATUS MIGRAINOSUS, NOT INTRACTABLE: ICD-10-CM

## 2022-06-29 PROCEDURE — 1036F TOBACCO NON-USER: CPT | Performed by: NURSE PRACTITIONER

## 2022-06-29 PROCEDURE — G8427 DOCREV CUR MEDS BY ELIG CLIN: HCPCS | Performed by: NURSE PRACTITIONER

## 2022-06-29 PROCEDURE — 99214 OFFICE O/P EST MOD 30 MIN: CPT | Performed by: NURSE PRACTITIONER

## 2022-06-29 PROCEDURE — G8419 CALC BMI OUT NRM PARAM NOF/U: HCPCS | Performed by: NURSE PRACTITIONER

## 2022-06-29 RX ORDER — RIZATRIPTAN BENZOATE 10 MG/1
10 TABLET ORAL
Qty: 9 TABLET | Refills: 5 | Status: SHIPPED | OUTPATIENT
Start: 2022-06-29 | End: 2022-07-07

## 2022-06-29 NOTE — TELEPHONE ENCOUNTER
Approval Aimovig 140 mg Auth#  [id number]. Starting 5/29/22to 12/29/22.   Electronically signed by Inocencio Augustin MA on 6/29/22 at 3:15 PM EDT
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
## 2022-06-29 NOTE — TELEPHONE ENCOUNTER
NP scheduled from the [facility].     Patient Appointment Form:      PCP: Gabriella DYER  Referring: Jonathon Robles     Has the Patient:    Seen a Cardiologist? No    Had a heart catheterization? no    Had heart surgery? no    Had a stress test or nuclear stress test? no    Had an echocardiogram? no    Had a vascular ultrasound? no    Had a 24/48 heart monitor or extended cardiac event monitor? no    Had recent blood work in the last 6 months? no    Had a pacemaker/ICD/ILR implant? no    Seen an Electrophysiologist? no        Will send records via: No prior cardiology hx or recs       Date & time of appointment:  7/7/22 @ 9:30 with Dr. Jose Hooks

## 2022-07-07 ENCOUNTER — NURSE ONLY (OUTPATIENT)
Dept: CARDIOLOGY CLINIC | Age: 22
End: 2022-07-07

## 2022-07-07 ENCOUNTER — OFFICE VISIT (OUTPATIENT)
Dept: CARDIOLOGY CLINIC | Age: 22
End: 2022-07-07
Payer: COMMERCIAL

## 2022-07-07 VITALS
SYSTOLIC BLOOD PRESSURE: 118 MMHG | WEIGHT: 158.4 LBS | RESPIRATION RATE: 18 BRPM | BODY MASS INDEX: 27.04 KG/M2 | HEART RATE: 66 BPM | DIASTOLIC BLOOD PRESSURE: 60 MMHG | HEIGHT: 64 IN

## 2022-07-07 DIAGNOSIS — G43.809 HEADACHE, VARIANT MIGRAINE: ICD-10-CM

## 2022-07-07 DIAGNOSIS — R55 SYNCOPE AND COLLAPSE: Primary | ICD-10-CM

## 2022-07-07 PROCEDURE — 93000 ELECTROCARDIOGRAM COMPLETE: CPT | Performed by: INTERNAL MEDICINE

## 2022-07-07 PROCEDURE — 99204 OFFICE O/P NEW MOD 45 MIN: CPT | Performed by: INTERNAL MEDICINE

## 2022-07-07 PROCEDURE — G8427 DOCREV CUR MEDS BY ELIG CLIN: HCPCS | Performed by: INTERNAL MEDICINE

## 2022-07-07 PROCEDURE — G8419 CALC BMI OUT NRM PARAM NOF/U: HCPCS | Performed by: INTERNAL MEDICINE

## 2022-07-07 PROCEDURE — 1036F TOBACCO NON-USER: CPT | Performed by: INTERNAL MEDICINE

## 2022-07-07 RX ORDER — DESOGESTREL AND ETHINYL ESTRADIOL 0.15-0.03
30 KIT ORAL DAILY
COMMUNITY
Start: 2022-06-25
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
## 2022-07-07 NOTE — PATIENT INSTRUCTIONS
 Continue all your medications at current doses.  Please check blood work (TSH)   We will schedule tilt table, 2 weeks, echocardiogram.    I will give you a handout for healthy diet   Restrict sodium intake to less than 2-2.5 g/day. Restrict fluid intake to less than 2.2 L/day. Goal BP is less than 130/80.  If your weight increases by > 2 lbs in a day or >5 lbs in more than a day, take an extra lasix pill.  Please try to exercise for 150 minutes a week.  I will see you back in the office in 6 months. Please call the office at ([phone], option 2) if you have any questions.

## 2022-07-08 ENCOUNTER — TELEPHONE (OUTPATIENT)
Dept: NON INVASIVE DIAGNOSTICS | Age: 22
End: 2022-07-08

## 2022-07-08 NOTE — TELEPHONE ENCOUNTER
Scheduled Tilt table test with Dr Lita Baez on 08/08/2022 @ 11:30 AM.  Jennifer Wilkerson at the hospital @ 10:30 AM.    Told the patient nothing to eat or drink after midnight  You may take your medication with tiny sip of water the day of the procedure  Hold the following medications: none  It is recommended that you have someone with you to drive following the tilt table test    Mailed instructions to patient to the confirmed address on file.

## 2022-07-18 ENCOUNTER — HOSPITAL ENCOUNTER (OUTPATIENT)
Age: 22
Discharge: HOME | End: 2022-07-18
Payer: COMMERCIAL

## 2022-07-18 DIAGNOSIS — R55 SYNCOPE AND COLLAPSE: ICD-10-CM

## 2022-07-18 LAB
ANION GAP SERPL CALCULATED.3IONS-SCNC: 8 MMOL/L (ref 7–16)
BASOPHILS ABSOLUTE: 0.05 E9/L (ref 0–0.2)
BASOPHILS RELATIVE PERCENT: 0.9 % (ref 0–2)
BUN BLDV-MCNC: 14 MG/DL (ref 6–20)
CALCIUM SERPL-MCNC: 9.4 MG/DL (ref 8.6–10.2)
CHLORIDE BLD-SCNC: 103 MMOL/L (ref 98–107)
CO2: 25 MMOL/L (ref 22–29)
CREAT SERPL-MCNC: 0.8 MG/DL (ref 0.5–1)
EOSINOPHILS ABSOLUTE: 0.11 E9/L (ref 0.05–0.5)
EOSINOPHILS RELATIVE PERCENT: 2 % (ref 0–6)
GFR AFRICAN AMERICAN: >60
GFR NON-AFRICAN AMERICAN: >60 ML/MIN/1.73
GLUCOSE BLD-MCNC: 87 MG/DL (ref 74–99)
HCT VFR BLD CALC: 33.4 % (ref 34–48)
HEMOGLOBIN: 10.5 G/DL (ref 11.5–15.5)
IMMATURE GRANULOCYTES #: 0.01 E9/L
IMMATURE GRANULOCYTES %: 0.2 % (ref 0–5)
LYMPHOCYTES ABSOLUTE: 1.72 E9/L (ref 1.5–4)
LYMPHOCYTES RELATIVE PERCENT: 30.8 % (ref 20–42)
MCH RBC QN AUTO: 27.3 PG (ref 26–35)
MCHC RBC AUTO-ENTMCNC: 31.4 % (ref 32–34.5)
MCV RBC AUTO: 86.8 FL (ref 80–99.9)
MONOCYTES ABSOLUTE: 0.48 E9/L (ref 0.1–0.95)
MONOCYTES RELATIVE PERCENT: 8.6 % (ref 2–12)
NEUTROPHILS ABSOLUTE: 3.22 E9/L (ref 1.8–7.3)
NEUTROPHILS RELATIVE PERCENT: 57.5 % (ref 43–80)
PDW BLD-RTO: 13.4 FL (ref 11.5–15)
PLATELET # BLD: 288 E9/L (ref 130–450)
PMV BLD AUTO: 10.5 FL (ref 7–12)
POTASSIUM SERPL-SCNC: 4.4 MMOL/L (ref 3.5–5)
RBC # BLD: 3.85 E12/L (ref 3.5–5.5)
SODIUM BLD-SCNC: 136 MMOL/L (ref 132–146)
TSH SERPL DL<=0.05 MIU/L-ACNC: 2.45 UIU/ML (ref 0.27–4.2)
WBC # BLD: 5.6 E9/L (ref 4.5–11.5)

## 2022-07-18 PROCEDURE — 36415 COLL VENOUS BLD VENIPUNCTURE: CPT

## 2022-07-18 PROCEDURE — 84443 ASSAY THYROID STIM HORMONE: CPT

## 2022-07-18 PROCEDURE — 85025 COMPLETE CBC W/AUTO DIFF WBC: CPT

## 2022-07-18 PROCEDURE — 80048 BASIC METABOLIC PNL TOTAL CA: CPT

## 2022-07-19 ENCOUNTER — TELEPHONE (OUTPATIENT)
Dept: CARDIOLOGY CLINIC | Age: 22
End: 2022-07-19

## 2022-07-19 NOTE — TELEPHONE ENCOUNTER
----- Message from Marjan Garsia MD sent at 7/18/2022  1:47 PM EDT -----  Her hemoglobin is lower than before. She should follow up with her PCP regarding that. Garcia she have the tilt table scheduled? What about the holter and the echo?

## 2022-07-19 NOTE — TELEPHONE ENCOUNTER
Spoke to patient results and recommendation given per Dr. Mihai Perez ended today patient will mail back     TT scheduled  8-8-22    ECHO pending will follow up with scheduling

## 2022-08-05 ENCOUNTER — TELEPHONE (OUTPATIENT)
Dept: CARDIAC CATH/INVASIVE PROCEDURES | Age: 22
End: 2022-08-05

## 2022-08-08 ENCOUNTER — HOSPITAL ENCOUNTER (OUTPATIENT)
Dept: CARDIAC CATH/INVASIVE PROCEDURES | Age: 22
Discharge: HOME | End: 2022-08-08

## 2023-02-21 LAB
ALANINE AMINOTRANSFERASE (SGPT) (U/L) IN SER/PLAS: 111 U/L (ref 7–45)
ALBUMIN (G/DL) IN SER/PLAS: 2.9 G/DL (ref 3.4–5)
ALKALINE PHOSPHATASE (U/L) IN SER/PLAS: 241 U/L (ref 33–110)
ANION GAP IN SER/PLAS: 15 MMOL/L (ref 10–20)
ASPARTATE AMINOTRANSFERASE (SGOT) (U/L) IN SER/PLAS: 59 U/L (ref 9–39)
BASOPHILS (10*3/UL) IN BLOOD BY AUTOMATED COUNT: 0.07 X10E9/L (ref 0–0.1)
BASOPHILS/100 LEUKOCYTES IN BLOOD BY AUTOMATED COUNT: 0.6 % (ref 0–2)
BILIRUBIN TOTAL (MG/DL) IN SER/PLAS: 0.4 MG/DL (ref 0–1.2)
CALCIUM (MG/DL) IN SER/PLAS: 8.7 MG/DL (ref 8.6–10.3)
CARBON DIOXIDE, TOTAL (MMOL/L) IN SER/PLAS: 21 MMOL/L (ref 21–32)
CHLORIDE (MMOL/L) IN SER/PLAS: 105 MMOL/L (ref 98–107)
CREATININE (MG/DL) IN SER/PLAS: 0.52 MG/DL (ref 0.5–1.05)
CREATININE (MG/DL) IN URINE: 134 MG/DL (ref 20–320)
EOSINOPHILS (10*3/UL) IN BLOOD BY AUTOMATED COUNT: 0.08 X10E9/L (ref 0–0.7)
EOSINOPHILS/100 LEUKOCYTES IN BLOOD BY AUTOMATED COUNT: 0.7 % (ref 0–6)
ERYTHROCYTE DISTRIBUTION WIDTH (RATIO) BY AUTOMATED COUNT: 12.6 % (ref 11.5–14.5)
ERYTHROCYTE MEAN CORPUSCULAR HEMOGLOBIN CONCENTRATION (G/DL) BY AUTOMATED: 33.4 G/DL (ref 32–36)
ERYTHROCYTE MEAN CORPUSCULAR VOLUME (FL) BY AUTOMATED COUNT: 86 FL (ref 80–100)
ERYTHROCYTES (10*6/UL) IN BLOOD BY AUTOMATED COUNT: 3.72 X10E12/L (ref 4–5.2)
GFR FEMALE: >90 ML/MIN/1.73M2
GLUCOSE (MG/DL) IN SER/PLAS: 83 MG/DL (ref 74–99)
HEMATOCRIT (%) IN BLOOD BY AUTOMATED COUNT: 32 % (ref 36–46)
HEMOGLOBIN (G/DL) IN BLOOD: 10.7 G/DL (ref 12–16)
IMMATURE GRANULOCYTES/100 LEUKOCYTES IN BLOOD BY AUTOMATED COUNT: 0.5 % (ref 0–0.9)
LACTATE DEHYDROGENASE (U/L) IN SER/PLAS BY LAC->PYR RXN: 203 U/L (ref 84–246)
LEUKOCYTES (10*3/UL) IN BLOOD BY AUTOMATED COUNT: 12 X10E9/L (ref 4.4–11.3)
LYMPHOCYTES (10*3/UL) IN BLOOD BY AUTOMATED COUNT: 6.21 X10E9/L (ref 1.2–4.8)
LYMPHOCYTES/100 LEUKOCYTES IN BLOOD BY AUTOMATED COUNT: 51.8 % (ref 13–44)
MONOCYTES (10*3/UL) IN BLOOD BY AUTOMATED COUNT: 0.57 X10E9/L (ref 0.1–1)
MONOCYTES/100 LEUKOCYTES IN BLOOD BY AUTOMATED COUNT: 4.8 % (ref 2–10)
NEUTROPHILS (10*3/UL) IN BLOOD BY AUTOMATED COUNT: 5.01 X10E9/L (ref 1.2–7.7)
NEUTROPHILS/100 LEUKOCYTES IN BLOOD BY AUTOMATED COUNT: 41.6 % (ref 40–80)
PLATELETS (10*3/UL) IN BLOOD AUTOMATED COUNT: 212 X10E9/L (ref 150–450)
POTASSIUM (MMOL/L) IN SER/PLAS: 4 MMOL/L (ref 3.5–5.3)
PROTEIN (MG/DL) IN URINE: 35 MG/DL (ref 5–24)
PROTEIN TOTAL: 5.8 G/DL (ref 6.4–8.2)
PROTEIN/CREATININE (MG/MG) IN URINE: 0.26 MG/MG CREAT (ref 0–0.17)
RBC MORPHOLOGY IN BLOOD: NORMAL
SODIUM (MMOL/L) IN SER/PLAS: 137 MMOL/L (ref 136–145)
UREA NITROGEN (MG/DL) IN SER/PLAS: 10 MG/DL (ref 6–23)

## 2023-03-05 LAB — GROUP B STREP SCREEN: NORMAL

## 2023-03-13 LAB — URINE CULTURE: ABNORMAL

## 2023-07-05 LAB
PROLACTIN (UG/L) IN SER/PLAS: 24.2 UG/L (ref 3–20)
THYROTROPIN (MIU/L) IN SER/PLAS BY DETECTION LIMIT <= 0.05 MIU/L: 2.67 MIU/L (ref 0.44–3.98)
THYROXINE (T4) FREE (NG/DL) IN SER/PLAS: 1.07 NG/DL (ref 0.78–1.48)

## 2023-11-02 ENCOUNTER — TELEPHONE (OUTPATIENT)
Dept: OBSTETRICS AND GYNECOLOGY | Facility: CLINIC | Age: 23
End: 2023-11-02
Payer: COMMERCIAL

## 2023-11-10 ENCOUNTER — TELEPHONE (OUTPATIENT)
Dept: OBSTETRICS AND GYNECOLOGY | Facility: CLINIC | Age: 23
End: 2023-11-10
Payer: COMMERCIAL

## 2023-11-10 NOTE — TELEPHONE ENCOUNTER
Pt is 7mo PP. 3-4 days ago she had a negative UPT, and had positive UPT yesterday. Her last cycle was 10/09/23 to 10/12/23. She would like to know if she needs blood work to confirm or if she needs to be scheduled for a new pregnancy.

## 2023-11-14 ENCOUNTER — TELEMEDICINE (OUTPATIENT)
Dept: OBSTETRICS AND GYNECOLOGY | Facility: CLINIC | Age: 23
End: 2023-11-14
Payer: COMMERCIAL

## 2023-11-14 DIAGNOSIS — N93.9 ABNORMAL UTERINE BLEEDING (AUB): Primary | ICD-10-CM

## 2023-11-14 DIAGNOSIS — Z34.81 MULTIGRAVIDA IN FIRST TRIMESTER (HHS-HCC): ICD-10-CM

## 2023-11-14 PROCEDURE — 99213 OFFICE O/P EST LOW 20 MIN: CPT | Performed by: NURSE PRACTITIONER

## 2023-11-14 RX ORDER — PYRIDOXINE HCL (VITAMIN B6) 25 MG
25 TABLET ORAL EVERY 8 HOURS
Qty: 90 TABLET | Refills: 2 | Status: SHIPPED | OUTPATIENT
Start: 2023-11-14 | End: 2023-12-11

## 2023-11-14 NOTE — PROGRESS NOTES
Patient presents for a telehealth visit with cc of missed LMP. 10-    Estimated GA (based on LMP): 5.1  Estimated YOVANY (based on LMP): 2024  She completed a home pregnancy test that was + on 10-12-23  Since + UPT she denies vaginal bleeding or c/o unusual pain. One day of spotting   Unplanned planned pregnancy. Coping well. Ok with the pregnancy.     Latex allergy? No   Blood transfusion acceptable? Yes     28 day cycle? Yes   Age of onset (menarche): 14  BC at conception? Yes not taking regularly   hCG+ at home (date) 10-12-23    She is seeking to establish with  [facility].   Currently she is on PNV medications.   No current outpatient medications on file prior to visit.     No current facility-administered medications on file prior to visit.      She   Denies smoking, Denies  Denies drugs, Denies  Denies alcohol consumption. Before founding out pregnant       OB History    Para Term  AB Living   2 1 1   1 1   SAB IAB Ectopic Multiple Live Births   1              # Outcome Date GA Lbr Natan/2nd Weight Sex Delivery Anes PTL Lv   2 Term 23    M CS-LTranv Gen N       Complications: Fetal Intolerance   1 SAB 2022             Past Medical History:   Diagnosis Date    Depression     Encounter for  screening for malformations 2022    Encounter for  screening for malformations    Migraine       Past Surgical History:   Procedure Laterality Date    OTHER SURGICAL HISTORY  2022    Foot surgery    OTHER SURGICAL HISTORY  2022    Dilation and curettage           She denies complications during the course of her previous pregnancies.      Personal medical and surgical history reviewed:  She denies personal hx of STIs including: HIV, RPR, HEP B, HEP C, and HERPES  Denies gc/ct also.   No personal hx of DM or HTN.   No personal hx of GYN surgeries.      Family Hx:  No family hx concerns of thrombophilias.   Denies family hx of births with  complications, denies chromosomal or structural abnormalities in family member's births.  Uncle Spina Bifida     Previous genetic testing:   CF/ SMA/ Thalassemias- unsure if this was done pregnancy     ROS:  + nausea- yes - planned b6 and benadryl as needed   +breast tenderness- Yes   +missed LMP    Follow up in about 4 weeks (around 12/12/2023) for NOB, ultrasound for dating in 2-3 weeks.     Education about care [place]: Delivery planned for [facility] Reviewed   FOB: Heber Martinez   ASA criteria   PREVIOUS C/S - 7 MONTHS   Still breastfeeding.     Luisa Farah, APRN-CNM, APRN-CNP

## 2023-11-16 ENCOUNTER — TELEPHONE (OUTPATIENT)
Dept: OBSTETRICS AND GYNECOLOGY | Facility: CLINIC | Age: 23
End: 2023-11-16
Payer: COMMERCIAL

## 2023-11-16 NOTE — TELEPHONE ENCOUNTER
Spoke with pt. 5w3d  c/o signficant weight gain in 2 weeks. Reports she had been 180lbs at home 2 weeks ago but when she went to [facility] they told her she was 199lb, and now her home scale says 204lbs. Pt denies swelling of face/hands/feet, just reports abdomen seems bloated but she denies constipation. When asked about SOB, she reports she does get winded easier with activity the past week but no SOB at rest. Reviewed with PURNIMA De Luna CNM. She had me advise pt of progesterone's effect on smooth muscle leading to becoming winded easier with activity, would only be concerned if pt was feeling winded/SOB at rest. Otherwise, advised that weight gain and bloating can happen more rapidly with multiparous pregnancy, and that pt can continue to monitor. She can try limiting salt intake to see if that helps. Pt agreeable with plan of care. Will call back if any worsening symptoms

## 2023-11-22 ENCOUNTER — TELEPHONE (OUTPATIENT)
Dept: OBSTETRICS AND GYNECOLOGY | Facility: CLINIC | Age: 23
End: 2023-11-22
Payer: COMMERCIAL

## 2023-11-22 ENCOUNTER — HOSPITAL ENCOUNTER (EMERGENCY)
Facility: HOSPITAL | Age: 23
Discharge: HOME | End: 2023-11-22
Attending: STUDENT IN AN ORGANIZED HEALTH CARE EDUCATION/TRAINING PROGRAM
Payer: COMMERCIAL

## 2023-11-22 ENCOUNTER — PHARMACY VISIT (OUTPATIENT)
Dept: PHARMACY | Facility: CLINIC | Age: 23
End: 2023-11-22
Payer: MEDICAID

## 2023-11-22 ENCOUNTER — APPOINTMENT (OUTPATIENT)
Dept: RADIOLOGY | Facility: HOSPITAL | Age: 23
End: 2023-11-22
Payer: COMMERCIAL

## 2023-11-22 VITALS
HEART RATE: 78 BPM | WEIGHT: 197 LBS | DIASTOLIC BLOOD PRESSURE: 78 MMHG | SYSTOLIC BLOOD PRESSURE: 115 MMHG | TEMPERATURE: 98.1 F | OXYGEN SATURATION: 100 % | RESPIRATION RATE: 16 BRPM | HEIGHT: 64 IN | BODY MASS INDEX: 33.63 KG/M2

## 2023-11-22 DIAGNOSIS — O23.41 UTI (URINARY TRACT INFECTION) DURING PREGNANCY, FIRST TRIMESTER (HHS-HCC): ICD-10-CM

## 2023-11-22 DIAGNOSIS — O20.9 VAGINAL BLEEDING AFFECTING EARLY PREGNANCY (HHS-HCC): Primary | ICD-10-CM

## 2023-11-22 LAB
ALBUMIN SERPL BCP-MCNC: 4.6 G/DL (ref 3.4–5)
ALP SERPL-CCNC: 84 U/L (ref 33–110)
ALT SERPL W P-5'-P-CCNC: 12 U/L (ref 7–45)
ANION GAP SERPL CALC-SCNC: 10 MMOL/L (ref 10–20)
APPEARANCE UR: ABNORMAL
AST SERPL W P-5'-P-CCNC: 14 U/L (ref 9–39)
B-HCG SERPL-ACNC: ABNORMAL MIU/ML
BACTERIA #/AREA URNS AUTO: ABNORMAL /HPF
BASOPHILS # BLD AUTO: 0.04 X10*3/UL (ref 0–0.1)
BASOPHILS NFR BLD AUTO: 0.5 %
BILIRUB SERPL-MCNC: 0.4 MG/DL (ref 0–1.2)
BILIRUB UR STRIP.AUTO-MCNC: NEGATIVE MG/DL
BUN SERPL-MCNC: 17 MG/DL (ref 6–23)
CALCIUM SERPL-MCNC: 9.5 MG/DL (ref 8.6–10.3)
CHLORIDE SERPL-SCNC: 103 MMOL/L (ref 98–107)
CO2 SERPL-SCNC: 26 MMOL/L (ref 21–32)
COLOR UR: YELLOW
CREAT SERPL-MCNC: 0.66 MG/DL (ref 0.5–1.05)
EOSINOPHIL # BLD AUTO: 0.07 X10*3/UL (ref 0–0.7)
EOSINOPHIL NFR BLD AUTO: 0.8 %
ERYTHROCYTE [DISTWIDTH] IN BLOOD BY AUTOMATED COUNT: 12.9 % (ref 11.5–14.5)
GFR SERPL CREATININE-BSD FRML MDRD: >90 ML/MIN/1.73M*2
GLUCOSE SERPL-MCNC: 88 MG/DL (ref 74–99)
GLUCOSE UR STRIP.AUTO-MCNC: NEGATIVE MG/DL
HCG UR QL IA.RAPID: POSITIVE
HCT VFR BLD AUTO: 38.4 % (ref 36–46)
HGB BLD-MCNC: 13 G/DL (ref 12–16)
HOLD SPECIMEN: NORMAL
IMM GRANULOCYTES # BLD AUTO: 0.02 X10*3/UL (ref 0–0.7)
IMM GRANULOCYTES NFR BLD AUTO: 0.2 % (ref 0–0.9)
KETONES UR STRIP.AUTO-MCNC: ABNORMAL MG/DL
LEUKOCYTE ESTERASE UR QL STRIP.AUTO: ABNORMAL
LYMPHOCYTES # BLD AUTO: 2.69 X10*3/UL (ref 1.2–4.8)
LYMPHOCYTES NFR BLD AUTO: 31.1 %
MCH RBC QN AUTO: 29.4 PG (ref 26–34)
MCHC RBC AUTO-ENTMCNC: 33.9 G/DL (ref 32–36)
MCV RBC AUTO: 87 FL (ref 80–100)
MONOCYTES # BLD AUTO: 0.44 X10*3/UL (ref 0.1–1)
MONOCYTES NFR BLD AUTO: 5.1 %
MUCOUS THREADS #/AREA URNS AUTO: ABNORMAL /LPF
NEUTROPHILS # BLD AUTO: 5.39 X10*3/UL (ref 1.2–7.7)
NEUTROPHILS NFR BLD AUTO: 62.3 %
NITRITE UR QL STRIP.AUTO: NEGATIVE
NRBC BLD-RTO: 0 /100 WBCS (ref 0–0)
PH UR STRIP.AUTO: 5 [PH]
PLATELET # BLD AUTO: 316 X10*3/UL (ref 150–450)
POTASSIUM SERPL-SCNC: 3.8 MMOL/L (ref 3.5–5.3)
PROT SERPL-MCNC: 7.5 G/DL (ref 6.4–8.2)
PROT UR STRIP.AUTO-MCNC: ABNORMAL MG/DL
RBC # BLD AUTO: 4.42 X10*6/UL (ref 4–5.2)
RBC # UR STRIP.AUTO: NEGATIVE /UL
RBC #/AREA URNS AUTO: ABNORMAL /HPF
SODIUM SERPL-SCNC: 135 MMOL/L (ref 136–145)
SP GR UR STRIP.AUTO: 1.04
SQUAMOUS #/AREA URNS AUTO: ABNORMAL /HPF
UROBILINOGEN UR STRIP.AUTO-MCNC: 2 MG/DL
WBC # BLD AUTO: 8.7 X10*3/UL (ref 4.4–11.3)
WBC #/AREA URNS AUTO: ABNORMAL /HPF

## 2023-11-22 PROCEDURE — 76817 TRANSVAGINAL US OBSTETRIC: CPT | Performed by: RADIOLOGY

## 2023-11-22 PROCEDURE — 99285 EMERGENCY DEPT VISIT HI MDM: CPT | Mod: 25 | Performed by: STUDENT IN AN ORGANIZED HEALTH CARE EDUCATION/TRAINING PROGRAM

## 2023-11-22 PROCEDURE — 36415 COLL VENOUS BLD VENIPUNCTURE: CPT | Performed by: NURSE PRACTITIONER

## 2023-11-22 PROCEDURE — 87086 URINE CULTURE/COLONY COUNT: CPT | Mod: PORLAB | Performed by: NURSE PRACTITIONER

## 2023-11-22 PROCEDURE — 2500000004 HC RX 250 GENERAL PHARMACY W/ HCPCS (ALT 636 FOR OP/ED): Performed by: NURSE PRACTITIONER

## 2023-11-22 PROCEDURE — 81025 URINE PREGNANCY TEST: CPT | Performed by: NURSE PRACTITIONER

## 2023-11-22 PROCEDURE — 76817 TRANSVAGINAL US OBSTETRIC: CPT

## 2023-11-22 PROCEDURE — 96360 HYDRATION IV INFUSION INIT: CPT

## 2023-11-22 PROCEDURE — RXMED WILLOW AMBULATORY MEDICATION CHARGE

## 2023-11-22 PROCEDURE — 99284 EMERGENCY DEPT VISIT MOD MDM: CPT | Mod: 25

## 2023-11-22 PROCEDURE — 80053 COMPREHEN METABOLIC PANEL: CPT | Performed by: NURSE PRACTITIONER

## 2023-11-22 PROCEDURE — 76815 OB US LIMITED FETUS(S): CPT | Performed by: RADIOLOGY

## 2023-11-22 PROCEDURE — 81001 URINALYSIS AUTO W/SCOPE: CPT | Performed by: NURSE PRACTITIONER

## 2023-11-22 PROCEDURE — 84702 CHORIONIC GONADOTROPIN TEST: CPT | Performed by: NURSE PRACTITIONER

## 2023-11-22 PROCEDURE — 85025 COMPLETE CBC W/AUTO DIFF WBC: CPT | Performed by: NURSE PRACTITIONER

## 2023-11-22 RX ORDER — CEPHALEXIN 500 MG/1
500 CAPSULE ORAL 4 TIMES DAILY
Qty: 28 CAPSULE | Refills: 0 | Status: SHIPPED | OUTPATIENT
Start: 2023-11-22 | End: 2023-11-22 | Stop reason: SDUPTHER

## 2023-11-22 RX ORDER — CEPHALEXIN 500 MG/1
500 CAPSULE ORAL 4 TIMES DAILY
Qty: 28 CAPSULE | Refills: 0 | Status: SHIPPED | OUTPATIENT
Start: 2023-11-22 | End: 2023-11-28 | Stop reason: WASHOUT

## 2023-11-22 RX ORDER — ONDANSETRON 4 MG/1
4 TABLET, ORALLY DISINTEGRATING ORAL EVERY 8 HOURS PRN
Status: DISCONTINUED | OUTPATIENT
Start: 2023-11-22 | End: 2023-11-22 | Stop reason: HOSPADM

## 2023-11-22 RX ORDER — ACETAMINOPHEN 325 MG/1
975 TABLET ORAL ONCE AS NEEDED
Status: DISCONTINUED | OUTPATIENT
Start: 2023-11-22 | End: 2023-11-22 | Stop reason: HOSPADM

## 2023-11-22 RX ADMIN — SODIUM CHLORIDE 1000 ML: 9 INJECTION, SOLUTION INTRAVENOUS at 17:23

## 2023-11-22 ASSESSMENT — PAIN - FUNCTIONAL ASSESSMENT: PAIN_FUNCTIONAL_ASSESSMENT: 0-10

## 2023-11-22 ASSESSMENT — COLUMBIA-SUICIDE SEVERITY RATING SCALE - C-SSRS
6. HAVE YOU EVER DONE ANYTHING, STARTED TO DO ANYTHING, OR PREPARED TO DO ANYTHING TO END YOUR LIFE?: NO
1. IN THE PAST MONTH, HAVE YOU WISHED YOU WERE DEAD OR WISHED YOU COULD GO TO SLEEP AND NOT WAKE UP?: NO
2. HAVE YOU ACTUALLY HAD ANY THOUGHTS OF KILLING YOURSELF?: NO

## 2023-11-22 ASSESSMENT — LIFESTYLE VARIABLES
HAVE YOU EVER FELT YOU SHOULD CUT DOWN ON YOUR DRINKING: NO
EVER HAD A DRINK FIRST THING IN THE MORNING TO STEADY YOUR NERVES TO GET RID OF A HANGOVER: NO
HAVE PEOPLE ANNOYED YOU BY CRITICIZING YOUR DRINKING: NO
REASON UNABLE TO ASSESS: NO
EVER FELT BAD OR GUILTY ABOUT YOUR DRINKING: NO

## 2023-11-22 ASSESSMENT — PAIN SCALES - GENERAL: PAINLEVEL_OUTOF10: 4

## 2023-11-22 ASSESSMENT — PAIN DESCRIPTION - LOCATION: LOCATION: ABDOMEN

## 2023-11-22 ASSESSMENT — PAIN DESCRIPTION - PAIN TYPE: TYPE: ACUTE PAIN

## 2023-11-22 NOTE — ED TRIAGE NOTES
Pt to ER with c/o vaginal bleeding that started today. Pt has not seen a provider yet, but had virtual visit with midwife. Pt states she is six weeks pregnant.

## 2023-11-22 NOTE — DISCHARGE INSTRUCTIONS
URINARY TRACT INFECTION PATIENT INSTRUCTIONS:    BLADDER INFECTION OVERVIEW  Bladder infections are one of the most common infections, causing symptoms of burning with urination and needing to urinate frequently. A bladder infection is a type of urinary tract infection (UTI). Bladder infections are more common is women than men. Most women have an uncomplicated bladder infection that is easily treated with a short course of antibiotics. In men, bladder infections may also affect the prostate gland, and a longer course of treatment may be needed.    BLADDER INFECTION CAUSES  The urinary tract includes the kidneys (which filter urine), ureters (the tube that carries urine from the kidneys to the bladder), the bladder (which stores urine), and urethra (the tube that carries urine out of the bladder).  Bacteria do not normally live in these areas. However, bacteria normally live close to the urethra in women and men who are not circumcised. Bladder infections occur when bacteria travel up the urethra into the bladder.  Factors that increase the risk of developing a bladder infection include:  Vaginal sex  Use of spermicides  History of past bladder infections  Diabetes  In men, not being circumcised or having anal sex increase the risk of bladder infections.    BLADDER INFECTION SYMPTOMS  The typical symptoms of a bladder infection include:  Pain or burning when urinating  Frequent need to urinate  Urgent need to urinate  Blood in the urine  Fever, back pain, nausea, or vomiting are not common symptoms of a bladder infection, but can occur in people with a kidney infection (pyelonephritis). If you have these symptoms, you should call your doctor or nurse immediately.    Is it a bladder infection or something else? -- Burning with urination can also occur in people with vaginitis (eg, yeast infection) or urethritis (inflammation of the urethra). For this reason, it is important to call your healthcare provider before  "assuming you have a bladder infection.    BLADDER INFECTION DIAGNOSIS  Simple bladder infections are usually diagnosed based upon your symptoms alone. However, most patients, especially those who have bladder infection symptoms for the first time, should see a healthcare provider for urine testing.    Urine culture -- A urine culture is a test that uses a sample of urine to try and grow bacteria in a laboratory. It usually requires about 48 hours to get results.  However, a urine culture is not always required to diagnose a bladder infection. Urine culture is often recommended if:  You have never had a bladder infection before  You have symptoms that are not typical for bladder infection  You have had \"resistant\" bladder infections before  You have frequent bladder infections  You do not begin to feel better within 24 to 48 hours after starting antibiotics  You are pregnant    BLADDER INFECTION TREATMENT  Bladder infection -- In young, healthy adolescents and adults with a bladder infection, the usual treatment includes a three to seven day course of antibiotics. The typical drugs chosen are: trimethoprim-sulfamethoxazole (Bactrim®), nitrofurantoin (Macrobid®), ciprofloxacin (Cipro®) or levofloxacin (Levaquin®).  In men, the infection may involve your prostate gland and treatment is usually given for at least 7 days.    Your symptoms should begin to resolve within one day after starting treatment. It is important to take the full course of antibiotics to completely eliminate the infection. If your symptoms persist for more than two or three days after starting treatment, call your healthcare provider.    If needed, you can take a prescription medication that numbs the bladder and urethra (phenazopyridine [Pyridium®]) to reduce the burning pain of some UTIs. A similar medication is available without a prescription (eg, Uristat). Both medications change the color of the urine (usually blue or orange) and can interfere " with laboratory testing. You should not take these medications for more than 48 hours due to the risk of side effects. These medications do not treat the infection and must be taken along with an antibiotic.  Some providers recommend drinking more fluids while treating bladder infections to help flush bacteria from the bladder. Others believe that drinking more fluids may dilute the antibiotic in the bladder and make the medication less effective. No studies have been performed to address this issue.    There are also no good studies on the effectiveness of cranberry juice for treating a bladder infection; we do not recommend using cranberry juice to treat bladder infections.    Follow-up care -- Follow-up testing is not needed in healthy, young men or women with a bladder infection if symptoms resolve. Pregnant women are usually asked to have a repeat urine culture one to two weeks after treatment has ended to make sure the bacteria are no longer in the urine.    RECURRENT BLADDER INFECTIONS  Bladder infections versus other causes -- Some adults, especially women, develop bladder infections frequently. In this case, it is important to confirm that your symptoms (eg, pain or burning, frequency, and urgency) are caused by a bladder infection. Symptoms are usually similar from one infection to another. The best way to confirm an infection is to have a urine culture.  If your urine culture is negative for infection, other causes of pain, burning, and frequency should be investigated. There is no reason to take antibiotics if your urine culture is negative.   Need for further testing -- If you continue to develop bladder infections, you may require further testing.   If you continue to notice blood in your urine after your bladder infection has cleared, you should have further testing.   Preventing recurrent UTIs -- Women with recurrent urinary tract infections may be advised to take steps to prevent bladder infections,  including one or more of the following:  Changes in birth control -- Women who develop frequent bladder infections and use spermicides, particularly those who also use a diaphragm, may be encouraged to use an alternate method of birth control.   Cranberry products -- Taking cranberry juice or cranberry tablets has been promoted as one way to help prevent frequent bladder infections. However, this has not been proven.  Drinking more fluid and urinating after intercourse -- Although studies have not proven that drinking more fluids or urinating soon after intercourse can prevent infection, some healthcare providers recommend these measures since they are not harmful. Drinking more fluid may help to wash out bacteria that enter the bladder.  Postmenopausal women -- Postmenopausal women who develop recurrent bladder infections may benefit from using vaginal estrogen. Vaginal estrogen is available in a flexible ring that is worn in the vagina for three months (eg, Estring®), a small tablet (Vagifem®), or a cream (eg, Premarin® or Estrace®). Vaginal estrogen is discussed in more detail in a separate topic review.   Antibiotics -- A preventive antibiotic treatment may be recommended if you repeatedly develop bladder infections and have not responded to other preventive measures. Antibiotics are highly effective in preventing recurrent bladder infections and can be taken in several different ways.  Preventive antibiotic -- You can take a low dose of an antibiotic once per day or three times per week for six months to several years.  Antibiotics following intercourse -- In women who develop urinary tract infections after sex, taking a single low dose antibiotic after intercourse can help to prevent bladder infections.  Self-treatment -- A plan to begin antibiotics at the first sign of a bladder infection may be recommended in some situations. Before starting this regimen, it is important that you have had testing (urine  "cultures) to confirm that your symptoms are caused by a bladder infection; some people have symptoms of a bladder infection but do not actually have an infection.  INFORMATION FROM UP TO DATE - ALL RIGHTS RESERVED    PATIENT INSTRUCTIONS - THREATENED MISCARRIAGE    What is pregnancy loss?  This is the medical term for when a pregnancy ends before a person has been pregnant for 20 weeks. (A normal pregnancy lasts about 40 weeks.) Pregnancy loss is also called \"miscarriage.\"    To understand pregnancy and pregnancy loss, it can help to know these terms:    ?Uterus - This is the part of your body where a pregnancy grows (figure 1).    ?Embryo - This is the group of cells that starts growing when a person gets pregnant.    ?Fetus - At about 10 weeks of pregnancy, the embryo becomes a fetus. This is what it is called up until birth.    What causes pregnancy loss?  Most of the time, when a person has a pregnancy loss, it is not because of anything they did.    Pregnancy loss can happen if:    ?The embryo begins to develop but then stops growing - This is often due to genetic problems.    ?The pregnant person has certain medical problems - Examples include diabetes that is not well controlled or an abnormal uterus shape.    What are the symptoms of pregnancy loss?  The most common symptoms are bleeding from the vagina and belly pain or cramping. See your doctor or nurse right away if you are pregnant and have these symptoms. If you are not sure if you are pregnant, take a home pregnancy test.    You should also see your doctor or nurse if you are pregnant and:    ?You have a fever of 100°F (37.8°C) or higher.    ?Anything solid comes out of your vagina.    ?Thick fluid that smells bad comes out of your vagina.    If you cannot talk with your doctor or nurse, or if you have heavy bleeding (soaking a pad in 1 to 2 hours), go to the emergency department.    These symptoms do not always mean that you are having a pregnancy " "loss. Your doctor or nurse can help figure out if anything is wrong.    Will I need tests?  It depends. Your doctor or nurse might be able to tell if you have had a pregnancy loss just by asking you questions and doing a pelvic exam.    They might also look at your uterus by doing an ultrasound. This test uses sound waves to create pictures of the inside of your body. It lets the doctor look at the embryo or fetus and check for heart activity. If they can see heart activity, this means that you have not had a pregnancy loss.    You might also need a blood test and then another blood test several days later to check on your pregnancy.    How is pregnancy loss treated?  It is not possible to stop a pregnancy loss that has already started. If you have had a pregnancy loss, the pregnancy tissue needs to leave your body. Your options include:    ?Waiting to let it exit through your vagina by itself    ?Medicine to help it exit your vagina    ?Surgery to remove it from your uterus    In most cases, you get to decide. Your doctor or nurse will talk to you about each option to help you decide.    This is a very personal choice. Some people prefer to wait and let things happen naturally. Other people prefer specific treatment so they can have a better idea of what to expect and how long it will take. Sometimes, depending on your situation, 1 or more of these might not be an option.    If you have a negative blood type (for example, \"O negative\"), you might need a special injection to help prevent problems in future pregnancies. If you don't know your blood type, ask your doctor or nurse to check.    Can I prevent pregnancy loss?  There is no way to make sure that you will not have a pregnancy loss. But there are some things that you can do during pregnancy to lower your chances of having one:    ?Avoid tobacco products (including vaping), alcohol, cocaine, and other substances. Try to avoid injury to your belly.    ?Certain " infections increase the risk of pregnancy loss. Your doctor or nurse can talk to you about how to prevent these.    ?Some of the invasive tests used to check on a fetus during pregnancy can, in rare cases, cause pregnancy loss. If your doctor or nurse suggest any of these tests, ask whether the test could increase the risk of pregnancy loss.    ?Some medicines or other treatments can harm a fetus. Talk to your doctor or nurse before taking any medicines. This includes prescription medicines, over-the-counter products, herbs, and supplements. If you are pregnant and your doctor or nurse recommends a medical treatment or X-ray, ask if it could hurt your fetus.    If you have had a pregnancy loss in the past and you want to get pregnant again, your doctor or nurse might suggest taking daily prenatal vitamins and low-dose aspirin before and during your next pregnancy. This might lower your risk of having another pregnancy loss. Aspirin is not usually recommended for people who have not had a past pregnancy loss, or for people who have never given birth before. Do not take aspirin or any other medicines unless your doctor, nurse, or midwife tells you that it is safe.    What do I do after a pregnancy loss?  After a pregnancy loss, it's important to take care of yourself. This includes both your physical and emotional health.    ?Physical - After a pregnancy loss, your doctor or nurse will probably tell you not to have sex or put anything in your vagina for 2 weeks. This might help lower the risk of infection. If you plan to start birth control, they can talk to you about when and how to do this.    Get plenty of rest, and let other people help you if possible. When you feel ready, it can help to get physical activity. Even gentle activities, like walking, are good for your health.    ?Emotional - It's normal to feel sad or anxious or have other emotions after a pregnancy loss. Some people feel shock, numbness, or  emptiness. Others feel guilt, fear, confusion, or relief. There is no right way to feel, and your feelings might change each day.    Try to be gentle with yourself. Talking to loved ones or others who have had a pregnancy loss can help.    If you are struggling or think you might be depressed, tell your doctor or nurse. There are treatments that can help.    Can I have a normal pregnancy after a loss?  Probably. People who have had a pregnancy loss are somewhat more likely than those who have not to have another loss. But most people who have a pregnancy loss are able to have a healthy pregnancy in the future.    Your doctor will tell you if you should wait before trying to get pregnant again. In most cases, it's safe to start trying again as soon as you feel ready. But it's normal if it takes some time for you to feel ready again.    If you have 3 or more pregnancy losses, your doctor might want to do some tests to try to figure out why.

## 2023-11-22 NOTE — TELEPHONE ENCOUNTER
Pt's dentist called (Dr. Castillo) stating pt is there for wisdom teeth extraction. They've already received a dental clearance from us that states not to use any epinephrine. Dentist states they need to use epinephrine for the procedure and needs clearance from pt's CNM. I advised him that Elham is not in the office until after 1pm today but I will ask her. Their office is going to send a fax for clearance.

## 2023-11-22 NOTE — ED PROVIDER NOTES
"HPI   Chief Complaint   Patient presents with    Vaginal Bleeding - Pregnant     C/O VAGINAL BLEEDING THAT BEGAN TODAY, AND ALSO NOTED PAIN WHEN URINATING.       Patient presents to the emergency department for concern of miscarriage.  Patient is  and follows with [facility], with her last appointment on 2023 via telemedicine with Luisa CAMEJO.  She has had confirmation of pregnancy via urine pregnancy test however has yet to have confirmation of IUP via ultrasound.  Yesterday she had a \"dark, splotch of blood\" in her pad that she typically wears when pregnant and then today after urinating she wiped pink.  Otherwise she is having a \"little bit of cramping like a period,\" nausea and urinary frequency.  She has had no associated fever, syncope, URI symptoms, chest pain, shortness of breath, vomiting, abdominal pain, vaginal odor or concern for STI.  There is no calf pain, leg swelling or rash.  She came to the emergency department as this is a similar presentation when she had fetal loss at 6 weeks.  She did not take any over-the-counter intervention for symptoms.  She is on prenatal vitamins and vitamin B6 for nausea.  She reports her blood type to be be positive.  She denies a history of endometriosis, PCOS or ectopic pregnancy.  Her symptoms are mild in severity and persistent nature.      History provided by:  Patient   used: No                        [place] Coma Scale Score: 15                  Patient History   Past Medical History:   Diagnosis Date    Depression     Encounter for  screening for malformations 2022    Encounter for  screening for malformations    Migraine      Past Surgical History:   Procedure Laterality Date    OTHER SURGICAL HISTORY  2022    Foot surgery    OTHER SURGICAL HISTORY  2022    Dilation and curettage     No family history on file.  Social History     Tobacco Use    Smoking status: Never "    Smokeless tobacco: Never   Substance Use Topics    Alcohol use: Not Currently    Drug use: Never       Physical Exam   ED Triage Vitals [11/22/23 1553]   Temp Heart Rate Resp BP   36.7 °C (98.1 °F) 110 -- 109/58      SpO2 Temp Source Heart Rate Source Patient Position   98 % Tympanic Monitor Sitting      BP Location FiO2 (%)     Left arm --       Physical Exam  Vitals reviewed.   Constitutional:       General: She is not in acute distress.     Appearance: Normal appearance.   HENT:      Head: Normocephalic and atraumatic.      Mouth/Throat:      Lips: Pink.      Mouth: Mucous membranes are moist.   Cardiovascular:      Rate and Rhythm: Normal rate and regular rhythm.      Pulses:           Radial pulses are 2+ on the left side.      Heart sounds: No murmur heard.     Comments: No resting tachycardia on exam, HR 96 bpm.  Pulmonary:      Effort: Pulmonary effort is normal.      Breath sounds: Normal breath sounds.   Abdominal:      General: Bowel sounds are normal.      Palpations: Abdomen is soft.      Tenderness: There is no abdominal tenderness.      Comments: Patient talks through abdominal exam and no grimace.   Genitourinary:     Comments: Deferred for imaging.  Musculoskeletal:      Cervical back: Full passive range of motion without pain and neck supple.      Comments: ARANGO randomly.   Skin:     General: Skin is warm and dry.      Capillary Refill: Capillary refill takes less than 2 seconds.      Coloration: Skin is not pale.   Neurological:      General: No focal deficit present.      Mental Status: She is alert and oriented to person, place, and time.         ED Course & MDM   ED Course as of 11/22/23 1848   Wed Nov 22, 2023 1816 Report to Dr. Schwartz.  Plan to treat UTI with cephalexin.  Patient updated on partial results.  She verbalizes no antibiotic allergies and okay with cephalexin.  Awaiting ultrasound results. Will order M2B. [NA]   1831 IMPRESSION:  Intrauterine gestational sac with calculated  mean gestational sac  diameter of 1.4 cm, which corresponds to 6 week and 2 day gestational  age. There is an indeterminate peripheral area of lobularity in the  gestational sac, however no definite fetal pole. Given the lack of  identified fetal pole, serial B-HCG and short-term progress  ultrasound is recommended in 7 to 10 days for further evaluation to  confirm viability of the pregnancy.      Small amount of free fluid in the pelvis.   [CF]   1842 Patient evaluated by Dr. Schwartz.  Ready to go however recommends beta quant to be ordered by midwife not through us as they would likely want repeat ultrasound as well.  Patient made aware. [NA]      ED Course User Index  [CF] Rossy Schwartz MD  [NA] Sherie Smith, APRN-CNP         Diagnoses as of 11/22/23 1848   Vaginal bleeding affecting early pregnancy   UTI (urinary tract infection) during pregnancy, first trimester       Medical Decision Making  Patient presents the emergency department for concern of possible miscarriage.  This is a part of her differential diagnosis in addition to urinary tract infection, not ideation bleeding and plan is for urinalysis, baseline labs, beta hcg, IV fluids and ultrasound for confirmation of IUP.  Patient is aware she will require repeat beta hCG early next week despite today's results.  She declines offer for Tylenol and is amenable to this management plan.  Review of previous results confirms her ABO to be be positive.    Patient evaluated by Dr. Schwartz.   Labs and diagnostics as resulted above with US as interpreted by radiologist showing intrauterine gestational sac corresponding with 6 weeks 2 days however no definite fetal pole.  Patient does have a beta quantitative level of greater than 32,000 however she does require serial beta quant levels with her midwife.  Otherwise she does have bacteria that will be treated with cephalexin while pending urine culture result.  There is no leukocytosis, electrolyte  imbalance, renal insufficiency or transaminitis requiring inpatient management at this time.      Plan is for continuation of prenatal vitamin, antiemetic as needed, cephalexin 500 mg 4 times daily and to contact her midwife next open business day for close follow-up appointment for repeat beta quant and ultrasound. Patient is non-toxic, not hypoxic and appropriate for this outpatient management plan which they prefer. They are encouraged to arrange close follow up as discussed as well as provided in a written handout of discharge instructions. Patient was educated on signs of symptoms to watch for indicative of re-evaluation in the emergency department setting to include any worsening of current symptoms. Patient verbalized understanding of instructions and is amenable to this treatment plan with no social determinants of health that would obscure this plan. Patient departed ambulatory in stable condition after receiving meds to beds.        Procedure  Procedures     Sherie Smith, KUMAR-RENATA  11/22/23 8057

## 2023-11-24 LAB — BACTERIA UR CULT: NORMAL

## 2023-11-27 ENCOUNTER — TELEPHONE (OUTPATIENT)
Dept: OBSTETRICS AND GYNECOLOGY | Facility: CLINIC | Age: 23
End: 2023-11-27

## 2023-11-27 ENCOUNTER — TELEPHONE (OUTPATIENT)
Dept: LACTATION | Facility: CLINIC | Age: 23
End: 2023-11-27
Payer: COMMERCIAL

## 2023-11-28 ENCOUNTER — TELEPHONE (OUTPATIENT)
Dept: LACTATION | Facility: CLINIC | Age: 23
End: 2023-11-28

## 2023-11-28 ENCOUNTER — APPOINTMENT (OUTPATIENT)
Dept: OBSTETRICS AND GYNECOLOGY | Facility: CLINIC | Age: 23
End: 2023-11-28
Payer: COMMERCIAL

## 2023-11-28 ENCOUNTER — LAB (OUTPATIENT)
Dept: LAB | Facility: LAB | Age: 23
End: 2023-11-28
Payer: COMMERCIAL

## 2023-11-28 ENCOUNTER — HOSPITAL ENCOUNTER (OUTPATIENT)
Dept: RADIOLOGY | Facility: HOSPITAL | Age: 23
Discharge: HOME | End: 2023-11-28
Payer: COMMERCIAL

## 2023-11-28 ENCOUNTER — OFFICE VISIT (OUTPATIENT)
Dept: OBSTETRICS AND GYNECOLOGY | Facility: CLINIC | Age: 23
End: 2023-11-28
Payer: COMMERCIAL

## 2023-11-28 DIAGNOSIS — O20.0 THREATENED ABORTION, ANTEPARTUM CONDITION OR COMPLICATION (HHS-HCC): ICD-10-CM

## 2023-11-28 DIAGNOSIS — Z20.2 STD EXPOSURE: ICD-10-CM

## 2023-11-28 DIAGNOSIS — R10.31 RIGHT LOWER QUADRANT PAIN: Primary | ICD-10-CM

## 2023-11-28 LAB
ABO GROUP (TYPE) IN BLOOD: NORMAL
ANTIBODY SCREEN: NORMAL
B-HCG SERPL-ACNC: ABNORMAL MIU/ML
RH FACTOR (ANTIGEN D): NORMAL

## 2023-11-28 PROCEDURE — 76815 OB US LIMITED FETUS(S): CPT | Performed by: RADIOLOGY

## 2023-11-28 PROCEDURE — 86901 BLOOD TYPING SEROLOGIC RH(D): CPT

## 2023-11-28 PROCEDURE — 1036F TOBACCO NON-USER: CPT | Performed by: NURSE PRACTITIONER

## 2023-11-28 PROCEDURE — 87800 DETECT AGNT MULT DNA DIREC: CPT

## 2023-11-28 PROCEDURE — 86850 RBC ANTIBODY SCREEN: CPT

## 2023-11-28 PROCEDURE — 76817 TRANSVAGINAL US OBSTETRIC: CPT | Performed by: RADIOLOGY

## 2023-11-28 PROCEDURE — 76801 OB US < 14 WKS SINGLE FETUS: CPT

## 2023-11-28 PROCEDURE — 36415 COLL VENOUS BLD VENIPUNCTURE: CPT

## 2023-11-28 PROCEDURE — 99214 OFFICE O/P EST MOD 30 MIN: CPT | Performed by: NURSE PRACTITIONER

## 2023-11-28 PROCEDURE — 84702 CHORIONIC GONADOTROPIN TEST: CPT

## 2023-11-28 PROCEDURE — 86900 BLOOD TYPING SEROLOGIC ABO: CPT

## 2023-11-28 NOTE — LACTATION NOTE
Follow up phone call for message from patient. Left message for patient to call [facility] at [phone] to discuss  concerns.

## 2023-11-28 NOTE — LACTATION NOTE
Follow up phone call for phone message from patient. Left message for patient to call IB[facility] at [phone] to discuss concerns.

## 2023-11-28 NOTE — LACTATION NOTE
Phone call from patient infant 8 months old and is teething and biting.  Has not broken skin but is getting sore. Discussed offering cold teething toys and different textures prior to nursing. If infant bites, take infant off breast and say no. Discussed that many infant will also bit toward end of feeding, so watching closely for when infat seems about done.  Denies questions at this time.

## 2023-11-28 NOTE — PROGRESS NOTES
Subjective   Babs Brown is a 23 y.o. female who presents for threatened AB. S/P ED visit last week.     Since Wednesday, bleeding has come and gone. Nothing heavy. Mostly with wiping.   Pelvic pain: sharp and stabbing on right side that comes and goes, the pain has persisted since her ultrasound. Follow up ultrasound was recommended by radiology.     Menstrual History:  OB History          3    Para   1    Term   1            AB   1    Living   1         SAB   1    IAB        Ectopic        Multiple        Live Births                      No LMP recorded. Patient is pregnant.       Past Medical History:   Diagnosis Date    Depression     Encounter for  screening for malformations 2022    Encounter for  screening for malformations    Migraine       Past Surgical History:   Procedure Laterality Date    OTHER SURGICAL HISTORY  2022    Foot surgery    OTHER SURGICAL HISTORY  2022    Dilation and curettage      Objective   There were no vitals taken for this visit.    Genitourinary:  Palpation of the lymph nodes of the groin-no inguinal lymphadenopathy  External genitalia/perianal: Without lesions normal in appearance   Urethra: In appearance without lesions Bladder: non-tender to palpate  Vagina: Without lesions including Bartholin, urethra, Gillett's glands within normal limits all WNL   Cervix: Normal in appearance without lesions, no cervical motion tenderness to palpation  Uterus: Without enlargement, mobile, nontender to palpate  Bilateral adnexa:  without masses, nontender to palpate    Skin: Normal skin color and pigmentation    Psychiatric: Alert and oriented x3. Affect normal to patient baseline.  Mood: appropriate    Assessment/Plan   Diagnoses and all orders for this visit:  Right lower quadrant pain  Threatened , antepartum condition or complication  -     hCG, quantitative; Future  -     Type And Screen; Future  -     US OB < 14 weeks early;  Future  -     C. trachomatis + N. gonorrhoeae, Amplified  STD exposure      Problem List Items Addressed This Visit    None  Visit Diagnoses       Right lower quadrant pain    -  Primary    Threatened , antepartum condition or complication        Relevant Orders    hCG, quantitative (Completed)    Type And Screen (Completed)    US OB < 14 weeks early (Completed)    C. trachomatis + N. gonorrhoeae, Amplified (Completed)    STD exposure               Follow up in about 2 days (around 2023) for MAC image and NEW OBV .     Luisa Farah, APRN-CNM, APRN-CNP

## 2023-11-29 DIAGNOSIS — Z34.81 MULTIGRAVIDA IN FIRST TRIMESTER (HHS-HCC): Primary | ICD-10-CM

## 2023-11-29 DIAGNOSIS — O20.9 VAGINAL BLEEDING IN PREGNANCY, FIRST TRIMESTER (HHS-HCC): ICD-10-CM

## 2023-11-29 DIAGNOSIS — Z34.81 MULTIGRAVIDA IN FIRST TRIMESTER (HHS-HCC): ICD-10-CM

## 2023-11-29 LAB
C TRACH RRNA SPEC QL NAA+PROBE: NEGATIVE
N GONORRHOEA DNA SPEC QL PROBE+SIG AMP: NEGATIVE

## 2023-11-30 ENCOUNTER — ANCILLARY PROCEDURE (OUTPATIENT)
Dept: RADIOLOGY | Facility: CLINIC | Age: 23
End: 2023-11-30
Payer: COMMERCIAL

## 2023-11-30 DIAGNOSIS — Z34.90 PREGNANT (HHS-HCC): ICD-10-CM

## 2023-11-30 PROCEDURE — 76817 TRANSVAGINAL US OBSTETRIC: CPT | Performed by: OBSTETRICS & GYNECOLOGY

## 2023-11-30 PROCEDURE — 76801 OB US < 14 WKS SINGLE FETUS: CPT | Performed by: OBSTETRICS & GYNECOLOGY

## 2023-11-30 PROCEDURE — 76817 TRANSVAGINAL US OBSTETRIC: CPT

## 2023-12-04 ENCOUNTER — APPOINTMENT (OUTPATIENT)
Dept: OBSTETRICS AND GYNECOLOGY | Facility: CLINIC | Age: 23
End: 2023-12-04
Payer: COMMERCIAL

## 2023-12-07 ENCOUNTER — ANCILLARY PROCEDURE (OUTPATIENT)
Dept: RADIOLOGY | Facility: CLINIC | Age: 23
End: 2023-12-07
Payer: COMMERCIAL

## 2023-12-07 DIAGNOSIS — Z34.81 MULTIGRAVIDA IN FIRST TRIMESTER (HHS-HCC): ICD-10-CM

## 2023-12-07 PROCEDURE — 76816 OB US FOLLOW-UP PER FETUS: CPT | Performed by: OBSTETRICS & GYNECOLOGY

## 2023-12-07 PROCEDURE — 76816 OB US FOLLOW-UP PER FETUS: CPT

## 2023-12-08 DIAGNOSIS — N93.9 ABNORMAL UTERINE BLEEDING (AUB): ICD-10-CM

## 2023-12-11 ENCOUNTER — INITIAL PRENATAL (OUTPATIENT)
Dept: OBSTETRICS AND GYNECOLOGY | Facility: CLINIC | Age: 23
End: 2023-12-11
Payer: COMMERCIAL

## 2023-12-11 VITALS — DIASTOLIC BLOOD PRESSURE: 84 MMHG | BODY MASS INDEX: 34.02 KG/M2 | WEIGHT: 198.2 LBS | SYSTOLIC BLOOD PRESSURE: 130 MMHG

## 2023-12-11 DIAGNOSIS — Z82.79 FAMILY HISTORY OF SPINA BIFIDA: ICD-10-CM

## 2023-12-11 DIAGNOSIS — Z98.891 H/O CESAREAN SECTION: ICD-10-CM

## 2023-12-11 DIAGNOSIS — Z34.81 MULTIGRAVIDA IN FIRST TRIMESTER (HHS-HCC): Primary | ICD-10-CM

## 2023-12-11 DIAGNOSIS — Z34.91 PRENATAL CARE IN FIRST TRIMESTER (HHS-HCC): ICD-10-CM

## 2023-12-11 DIAGNOSIS — Z34.81 SUPERVISION OF NORMAL INTRAUTERINE PREGNANCY IN MULTIGRAVIDA IN FIRST TRIMESTER (HHS-HCC): ICD-10-CM

## 2023-12-11 DIAGNOSIS — O99.210 OBESITY IN PREGNANCY (HHS-HCC): ICD-10-CM

## 2023-12-11 DIAGNOSIS — Z3A.09 9 WEEKS GESTATION OF PREGNANCY (HHS-HCC): ICD-10-CM

## 2023-12-11 PROCEDURE — 99213 OFFICE O/P EST LOW 20 MIN: CPT | Performed by: NURSE PRACTITIONER

## 2023-12-11 PROCEDURE — H1000 PRENATAL CARE ATRISK ASSESSM: HCPCS | Performed by: NURSE PRACTITIONER

## 2023-12-11 RX ORDER — PYRIDOXINE HCL (VITAMIN B6) 25 MG
25 TABLET ORAL EVERY 8 HOURS
Qty: 270 TABLET | Refills: 1 | Status: SHIPPED | OUTPATIENT
Start: 2023-12-11

## 2023-12-11 NOTE — PROGRESS NOTES
Genetic Screening Genetic Screening/Teratology Counseling- Includes patient, baby's father, or anyone in either family with:      Positive       Intellectual disability and/or autism   Yes        Any other   Scoliosis, spina bifida, pt's cousin had a baby born with an enlarged heart                  Negative       Patient's age 35 years or older as of estimated date of delivery   No        Thalassemia (Italian, Greek, Mediterranean, or  background): MCV less than 80   No        Neural tube defect (Meningomyelocele, Spina bifida, or Anencephaly)   No        Congenital heart defect   No        Down syndrome   No        Beto-Sachs (Ashkenazi Oriental orthodox, Cajun, Faroese Hen[place])   No        Canavan disease (Ashkenazi Oriental orthodox)   No        Familial dysautonomia (Ashkenazi Oriental orthodox)   No        Sickle cell disease or trait ()   No        Hemophilia or other blood disorders   No        Muscular dystrophy   No        Cystic fibrosis   No        Valley's chorea   No        Other inherited genetic or chromosomal disorder   No        Maternal metabolic disorder (eg. Type 1 diabetes, PKU)   No        Patient or baby's father had child with birth defects not listed above   No        Recurrent pregnancy loss, or a stillbirth   No        Medications (including supplements, vitamins, herbs, or OTC drugs)/illicit/recreational drugs/alcohol since last menstrual period   No

## 2023-12-11 NOTE — PROGRESS NOTES
Subjective   Patient ID [id number]   Babs Brown is a 23 y.o.  at 9w0d with a working estimated date of delivery of 7/15/2024, by Last Menstrual Period who presents for an initial prenatal visit. This pregnancy is planned. Follow up for dating ultrasound.     Her pregnancy is complicated by:  Hx LTCS  --TOLAC r/f MFMU needs total  Family history of spinal bifida   -uncle, surgical repair   Obesity in pregnancy   BMI 34 at first visit     OB History    Para Term  AB Living   3 1 1   1 1   SAB IAB Ectopic Multiple Live Births   1              # Outcome Date GA Lbr Natan/2nd Weight Sex Delivery Anes PTL Lv   3 Current            2 Term 23    M CS-LTranv Gen N       Complications: Fetal Intolerance   1 SAB 2022                  Objective   Physical Exam- completed at last apt  Weight: 89.9 kg (198 lb 3.2 oz)  Expected Total Weight Gain: Could not be calculated   Pregravid BMI: Could not be calculated  BP: 130/84      Prenatal Labs  pending    Assessment/Plan   Diagnoses and all orders for this visit:  Multigravida in first trimester  -     CBC Anemia Panel With Reflex,Pregnancy; Future  -     Type And Screen; Future  -     Hepatitis B surface antigen; Future  -     Hepatitis C antibody; Future  -     Rubella Antibody, IgG; Future  -     Syphilis Screen with Reflex; Future  -     HIV 1/2 Antigen/Antibody Screen with Reflex to Confirmation; Future  -     Varicella Zoster Antibody, IgG; Future  -     Urine Culture  -     Hemoglobin A1C; Future  Family history of spina bifida  9 weeks gestation of pregnancy  H/O  section  Obesity in pregnancy  Prenatal care in first trimester  Supervision of normal intrauterine pregnancy in multigravida in first trimester      Immunizations: flu shot discussed   Prenatal Labs ordered  Daily prenatal vitamins prescribed  First trimester screening and second trimester screening discussed. Patient decided to proceed cfDNA and NT.   Follow up in 4 weeks  for return OB visit.  RTO 2 weeks labs for cfDNA and pregnancy labs only. Nurse visit.     Discussed TOLAC vs RLTCS. Will discuss further after 20 wga ultrasound. Pt would like time to consider.     Luisa Farah, KUMAR-EDDIE, KUMAR-CNP

## 2023-12-19 ENCOUNTER — TELEPHONE (OUTPATIENT)
Dept: OBSTETRICS AND GYNECOLOGY | Facility: CLINIC | Age: 23
End: 2023-12-19
Payer: COMMERCIAL

## 2023-12-19 NOTE — TELEPHONE ENCOUNTER
Pt called OBGYN office c/o HA and blurry vision. Denies abdominal pain/vaginal bleeding. Pt states she has been well hydrated and eating regularly. Will talk to CG and call pt back. Pt agreeable with plan of care with no further questions.

## 2023-12-26 ENCOUNTER — LAB REQUISITION (OUTPATIENT)
Dept: LAB | Facility: HOSPITAL | Age: 23
End: 2023-12-26
Payer: COMMERCIAL

## 2023-12-26 ENCOUNTER — CLINICAL SUPPORT (OUTPATIENT)
Dept: OBSTETRICS AND GYNECOLOGY | Facility: CLINIC | Age: 23
End: 2023-12-26
Payer: COMMERCIAL

## 2023-12-26 DIAGNOSIS — Z34.91 ENCOUNTER FOR SUPERVISION OF NORMAL PREGNANCY, UNSPECIFIED, FIRST TRIMESTER (HHS-HCC): ICD-10-CM

## 2023-12-26 DIAGNOSIS — Z34.81 ENCOUNTER FOR SUPERVISION OF OTHER NORMAL PREGNANCY, FIRST TRIMESTER (HHS-HCC): ICD-10-CM

## 2023-12-26 DIAGNOSIS — Z34.81 MULTIGRAVIDA IN FIRST TRIMESTER (HHS-HCC): ICD-10-CM

## 2023-12-26 DIAGNOSIS — Z34.91 PRENATAL CARE IN FIRST TRIMESTER (HHS-HCC): ICD-10-CM

## 2023-12-26 DIAGNOSIS — Z34.81 SUPERVISION OF NORMAL INTRAUTERINE PREGNANCY IN MULTIGRAVIDA IN FIRST TRIMESTER (HHS-HCC): ICD-10-CM

## 2023-12-26 LAB
ABO GROUP (TYPE) IN BLOOD: NORMAL
ANTIBODY SCREEN: NORMAL
ERYTHROCYTE [DISTWIDTH] IN BLOOD BY AUTOMATED COUNT: 13.2 % (ref 11.5–14.5)
HBV SURFACE AG SERPL QL IA: NONREACTIVE
HCT VFR BLD AUTO: 34 % (ref 36–46)
HCV AB SER QL: NONREACTIVE
HGB BLD-MCNC: 11.7 G/DL (ref 12–16)
HIV 1+2 AB+HIV1 P24 AG SERPL QL IA: NONREACTIVE
MCH RBC QN AUTO: 30.1 PG (ref 26–34)
MCHC RBC AUTO-ENTMCNC: 34.4 G/DL (ref 32–36)
MCV RBC AUTO: 87 FL (ref 80–100)
NRBC BLD-RTO: 0 /100 WBCS (ref 0–0)
PLATELET # BLD AUTO: 324 X10*3/UL (ref 150–450)
RBC # BLD AUTO: 3.89 X10*6/UL (ref 4–5.2)
REFLEX ADDED, ANEMIA PANEL: NORMAL
RH FACTOR (ANTIGEN D): NORMAL
RUBV IGG SERPL IA-ACNC: 0.9 IA
RUBV IGG SERPL QL IA: NORMAL
T PALLIDUM AB SER QL: NONREACTIVE
VARICELLA ZOSTER IGG INDEX: 0.8 IA
VZV IGG SER QL IA: NEGATIVE
WBC # BLD AUTO: 8.5 X10*3/UL (ref 4.4–11.3)

## 2023-12-26 PROCEDURE — 86317 IMMUNOASSAY INFECTIOUS AGENT: CPT

## 2023-12-26 PROCEDURE — 85027 COMPLETE CBC AUTOMATED: CPT

## 2023-12-26 PROCEDURE — 87389 HIV-1 AG W/HIV-1&-2 AB AG IA: CPT

## 2023-12-26 PROCEDURE — 86901 BLOOD TYPING SEROLOGIC RH(D): CPT | Mod: OUT | Performed by: NURSE PRACTITIONER

## 2023-12-26 PROCEDURE — 86780 TREPONEMA PALLIDUM: CPT

## 2023-12-26 PROCEDURE — 86850 RBC ANTIBODY SCREEN: CPT

## 2023-12-26 PROCEDURE — 87340 HEPATITIS B SURFACE AG IA: CPT

## 2023-12-26 PROCEDURE — 83036 HEMOGLOBIN GLYCOSYLATED A1C: CPT

## 2023-12-26 PROCEDURE — 86900 BLOOD TYPING SEROLOGIC ABO: CPT

## 2023-12-26 PROCEDURE — 87086 URINE CULTURE/COLONY COUNT: CPT

## 2023-12-26 PROCEDURE — 86803 HEPATITIS C AB TEST: CPT

## 2023-12-26 PROCEDURE — 86901 BLOOD TYPING SEROLOGIC RH(D): CPT

## 2023-12-26 PROCEDURE — 86787 VARICELLA-ZOSTER ANTIBODY: CPT

## 2023-12-26 PROCEDURE — 36415 COLL VENOUS BLD VENIPUNCTURE: CPT

## 2023-12-27 LAB
BACTERIA UR CULT: NORMAL
EST. AVERAGE GLUCOSE BLD GHB EST-MCNC: 105 MG/DL
HBA1C MFR BLD: 5.3 %

## 2023-12-31 PROBLEM — O09.899 RUBELLA NON-IMMUNE STATUS, ANTEPARTUM (HHS-HCC): Status: ACTIVE | Noted: 2023-12-31

## 2023-12-31 PROBLEM — Z28.39 MATERNAL VARICELLA, NON-IMMUNE (HHS-HCC): Status: ACTIVE | Noted: 2023-12-31

## 2023-12-31 PROBLEM — Z28.39 RUBELLA NON-IMMUNE STATUS, ANTEPARTUM (HHS-HCC): Status: ACTIVE | Noted: 2023-12-31

## 2023-12-31 PROBLEM — O09.899 MATERNAL VARICELLA, NON-IMMUNE (HHS-HCC): Status: ACTIVE | Noted: 2023-12-31

## 2024-01-07 ENCOUNTER — TELEPHONE (OUTPATIENT)
Dept: OBSTETRICS AND GYNECOLOGY | Facility: HOSPITAL | Age: 24
End: 2024-01-07
Payer: COMMERCIAL

## 2024-01-07 NOTE — TELEPHONE ENCOUNTER
Returned call to OB pager.   Babs reports severe abdominal pain that she is rating a 7/10 today at and near her previous c/s scar. She denies any VB. She has tried heat, a hot bath, and 1000 mg of Tylenol without real relief. She does endorse nausea and vomiting though states she had nausea prior to onset of abdominal pain.   Given pt is reporting pain as severe and not improving with the usual interventions, Babs was instructed to present to the ED for further evaluation. She voiced understanding and agreement with plan.     NELL Jerome

## 2024-01-08 ENCOUNTER — APPOINTMENT (OUTPATIENT)
Dept: OBSTETRICS AND GYNECOLOGY | Facility: CLINIC | Age: 24
End: 2024-01-08
Payer: COMMERCIAL

## 2024-01-08 NOTE — PROGRESS NOTES
"Subjective   Patient ID [id number]   Babs Brown is a 23 y.o.  at 13w0d with a working estimated date of delivery of 7/15/2024, by Last Menstrual Period who presents for a routine prenatal visit. She denies vaginal bleeding, leakage of fluid, decreased fetal movements, or contractions.    Babs is s/p LTCS with close baby spacing and called the midwifery service yesterday with complaint of abd pain at incision site, advised to follow up at ED.     Her pregnancy is complicated by:  ***    Objective   Physical Exam     Expected Total Weight Gain: Could not be calculated   Pregravid BMI: Could not be calculated            Prenatal Labs  Urine dip:  Lab Results   Component Value Date    KETONESU 5 (TRACE) (A) 2023       Lab Results   Component Value Date    HGB 11.7 (L) 2023    HCT 34.0 (L) 2023    ABO B 2023    HEPBSAG Nonreactive 2023     No results found for: \"PAPPA\", \"AFP\", \"HCG\", \"ESTRIOL\", \"INHBA\"  No results found for: \"GLUF\", \"GLUT1\", \"NHJJDLO6DG\", \"IEBEPJK2AC\"    Imaging  The most recent ultrasound was performed on The most recent ultrasound study is not finalized with a study GA of The most recent ultrasound study is not finalized and EFW of The most recent ultrasound study is not finalized.  The most recent ultrasound study is not finalized  The most recent ultrasound study is not finalized    Assessment/Plan   {Assess/Plan SmartLinks (Optional):83844}    Continue prenatal vitamin.  Labs reviewed.  Rhogam ***  GTT ***.  ***  Follow up in 2 weeks for a routine prenatal visit.  "

## 2024-01-09 ENCOUNTER — ROUTINE PRENATAL (OUTPATIENT)
Dept: OBSTETRICS AND GYNECOLOGY | Facility: CLINIC | Age: 24
End: 2024-01-09
Payer: COMMERCIAL

## 2024-01-09 ENCOUNTER — HOSPITAL ENCOUNTER (OUTPATIENT)
Dept: RADIOLOGY | Facility: HOSPITAL | Age: 24
Discharge: HOME | End: 2024-01-09
Payer: COMMERCIAL

## 2024-01-09 VITALS — SYSTOLIC BLOOD PRESSURE: 126 MMHG | BODY MASS INDEX: 33.47 KG/M2 | DIASTOLIC BLOOD PRESSURE: 82 MMHG | WEIGHT: 195 LBS

## 2024-01-09 DIAGNOSIS — Z34.81 SUPERVISION OF NORMAL INTRAUTERINE PREGNANCY IN MULTIGRAVIDA IN FIRST TRIMESTER (HHS-HCC): ICD-10-CM

## 2024-01-09 DIAGNOSIS — O36.8390: ICD-10-CM

## 2024-01-09 DIAGNOSIS — R10.2 PELVIC PAIN: ICD-10-CM

## 2024-01-09 DIAGNOSIS — Z28.39 RUBELLA NON-IMMUNE STATUS, ANTEPARTUM (HHS-HCC): ICD-10-CM

## 2024-01-09 DIAGNOSIS — O09.899 MATERNAL VARICELLA, NON-IMMUNE (HHS-HCC): ICD-10-CM

## 2024-01-09 DIAGNOSIS — Z3A.13 13 WEEKS GESTATION OF PREGNANCY (HHS-HCC): ICD-10-CM

## 2024-01-09 DIAGNOSIS — R35.0 FREQUENCY OF URINATION: Primary | ICD-10-CM

## 2024-01-09 DIAGNOSIS — O09.899 RUBELLA NON-IMMUNE STATUS, ANTEPARTUM (HHS-HCC): ICD-10-CM

## 2024-01-09 DIAGNOSIS — O99.210 OBESITY IN PREGNANCY (HHS-HCC): ICD-10-CM

## 2024-01-09 DIAGNOSIS — Z28.39 MATERNAL VARICELLA, NON-IMMUNE (HHS-HCC): ICD-10-CM

## 2024-01-09 LAB
POC BILIRUBIN, URINE: ABNORMAL
POC BLOOD, URINE: NEGATIVE
POC GLUCOSE, URINE: NEGATIVE MG/DL
POC KETONES, URINE: ABNORMAL MG/DL
POC LEUKOCYTES, URINE: NEGATIVE
POC NITRITE,URINE: NEGATIVE
POC PH, URINE: 6.5 PH
POC PROTEIN, URINE: ABNORMAL MG/DL
POC SPECIFIC GRAVITY, URINE: 1.01
POC UROBILINOGEN, URINE: 0.2 EU/DL

## 2024-01-09 PROCEDURE — 76801 OB US < 14 WKS SINGLE FETUS: CPT

## 2024-01-09 PROCEDURE — 99213 OFFICE O/P EST LOW 20 MIN: CPT | Performed by: NURSE PRACTITIONER

## 2024-01-09 PROCEDURE — 87086 URINE CULTURE/COLONY COUNT: CPT

## 2024-01-09 PROCEDURE — 76815 OB US LIMITED FETUS(S): CPT | Performed by: RADIOLOGY

## 2024-01-09 RX ORDER — FERROUS SULFATE 325(65) MG
65 TABLET, DELAYED RELEASE (ENTERIC COATED) ORAL
COMMUNITY
End: 2024-03-14 | Stop reason: WASHOUT

## 2024-01-09 NOTE — PROGRESS NOTES
Not enough blood collected when Prequel was initially drawn - needs redrawn at outpatient lab due to being a hard stick.     Subjective   Patient ID [id number]   Babs Brown is a 23 y.o.  at 13w0d with a working estimated date of delivery of 7/15/2024, by Last Menstrual Period who presents for a routine prenatal visit. She denies vaginal bleeding, leakage of fluid, decreased fetal movements, or contractions.    Babs is s/p LTCS with close baby spacing and called the midwifery service over the weekend with complaint of abd pain at incision site, advised to follow up at ED. Pain resolved, pt states urinary frequency.     Her pregnancy is complicated by:  Hx LTCS  --TOLAC r/f MFMU needed, plan to discuss further, around 20 wga  Family history of spinal bifida   -uncle, surgical repair   Obesity in pregnancy   BMI 34 at first visit     Objective   Physical Exam  Weight: 88.5 kg (195 lb)  Expected Total Weight Gain: Could not be calculated   Pregravid BMI: Could not be calculated  BP: 126/82  Fetal Heart Rate: u/s      Prenatal Labs  Urine dip:  Lab Results   Component Value Date    KETONESU TRACE (A) 2024       Lab Results   Component Value Date    HGB 11.7 (L) 2023    HCT 34.0 (L) 2023    ABO B 2023    HEPBSAG Nonreactive 2023       Imaging  The most recent ultrasound was performed on 2024 with a study GA of   and EFW of  .          Assessment/Plan   Diagnoses and all orders for this visit:  Frequency of urination  -     Urine culture  -     POCT UA (nonautomated) manually resulted  Obesity in pregnancy  Rubella non-immune status, antepartum  Maternal varicella, non-immune  Supervision of normal intrauterine pregnancy in multigravida in first trimester  -     [facility] Prequel Prenatal Screen; Future  13 weeks gestation of pregnancy  -     US OB < 14 weeks early; Future  Unable to hear fetal heart tones as reason for ultrasound scan  -     US OB < 14 weeks early; Future  Pelvic  pain  -     POCT UA (nonautomated) manually resulted  -     US OB < 14 weeks early; Future      Continue prenatal vitamin.  Labs reviewed.  Stat ultrasound confirmed viability   -Returned with adequate dating and +FHT  Keep NT this week  Prequel needs repeated due to the testing   Follow up in 4 weeks for a routine prenatal visit.  MFMU score pending, anantomy       Luisa Farah, APRN-CNM, APRN-CNP

## 2024-01-10 LAB — BACTERIA UR CULT: NORMAL

## 2024-01-12 ENCOUNTER — ANCILLARY PROCEDURE (OUTPATIENT)
Dept: RADIOLOGY | Facility: CLINIC | Age: 24
End: 2024-01-12
Payer: COMMERCIAL

## 2024-01-12 DIAGNOSIS — Z36.82 ENCOUNTER FOR ANTENATAL SCREENING FOR NUCHAL TRANSLUCENCY (HHS-HCC): ICD-10-CM

## 2024-01-12 DIAGNOSIS — O28.3 NUCHAL FOLD THICKENING ON PRENATAL ULTRASOUND: ICD-10-CM

## 2024-01-12 PROCEDURE — 76813 OB US NUCHAL MEAS 1 GEST: CPT

## 2024-01-12 PROCEDURE — 76816 OB US FOLLOW-UP PER FETUS: CPT | Performed by: OBSTETRICS & GYNECOLOGY

## 2024-01-12 PROCEDURE — 76816 OB US FOLLOW-UP PER FETUS: CPT

## 2024-01-16 ENCOUNTER — LAB (OUTPATIENT)
Dept: LAB | Facility: LAB | Age: 24
End: 2024-01-16
Payer: COMMERCIAL

## 2024-01-16 DIAGNOSIS — Z34.81 SUPERVISION OF NORMAL INTRAUTERINE PREGNANCY IN MULTIGRAVIDA IN FIRST TRIMESTER (HHS-HCC): ICD-10-CM

## 2024-01-16 PROCEDURE — 36415 COLL VENOUS BLD VENIPUNCTURE: CPT

## 2024-01-26 ENCOUNTER — HOSPITAL ENCOUNTER (EMERGENCY)
Facility: HOSPITAL | Age: 24
Discharge: HOME | End: 2024-01-26
Attending: EMERGENCY MEDICINE
Payer: COMMERCIAL

## 2024-01-26 ENCOUNTER — APPOINTMENT (OUTPATIENT)
Dept: RADIOLOGY | Facility: HOSPITAL | Age: 24
End: 2024-01-26
Payer: COMMERCIAL

## 2024-01-26 VITALS
DIASTOLIC BLOOD PRESSURE: 71 MMHG | HEIGHT: 64 IN | TEMPERATURE: 97.1 F | BODY MASS INDEX: 32.44 KG/M2 | WEIGHT: 190 LBS | SYSTOLIC BLOOD PRESSURE: 106 MMHG | HEART RATE: 83 BPM | RESPIRATION RATE: 16 BRPM | OXYGEN SATURATION: 100 %

## 2024-01-26 DIAGNOSIS — N39.0 LOWER URINARY TRACT INFECTIOUS DISEASE: Primary | ICD-10-CM

## 2024-01-26 DIAGNOSIS — R10.2 PELVIC PAIN: ICD-10-CM

## 2024-01-26 LAB
ABO GROUP (TYPE) IN BLOOD: NORMAL
ANTIBODY SCREEN: NORMAL
APPEARANCE UR: ABNORMAL
B-HCG SERPL-ACNC: ABNORMAL MIU/ML
BACTERIA #/AREA URNS AUTO: ABNORMAL /HPF
BILIRUB UR STRIP.AUTO-MCNC: NEGATIVE MG/DL
COLOR UR: YELLOW
ERYTHROCYTE [DISTWIDTH] IN BLOOD BY AUTOMATED COUNT: 13.2 % (ref 11.5–14.5)
GLUCOSE UR STRIP.AUTO-MCNC: NEGATIVE MG/DL
HCT VFR BLD AUTO: 34.3 % (ref 36–46)
HGB BLD-MCNC: 12 G/DL (ref 12–16)
KETONES UR STRIP.AUTO-MCNC: NEGATIVE MG/DL
LEUKOCYTE ESTERASE UR QL STRIP.AUTO: ABNORMAL
MCH RBC QN AUTO: 30.3 PG (ref 26–34)
MCHC RBC AUTO-ENTMCNC: 35 G/DL (ref 32–36)
MCV RBC AUTO: 87 FL (ref 80–100)
MUCOUS THREADS #/AREA URNS AUTO: ABNORMAL /LPF
NITRITE UR QL STRIP.AUTO: NEGATIVE
NRBC BLD-RTO: 0 /100 WBCS (ref 0–0)
PH UR STRIP.AUTO: 6 [PH]
PLATELET # BLD AUTO: 273 X10*3/UL (ref 150–450)
PROT UR STRIP.AUTO-MCNC: NEGATIVE MG/DL
RBC # BLD AUTO: 3.96 X10*6/UL (ref 4–5.2)
RBC # UR STRIP.AUTO: NEGATIVE /UL
RBC #/AREA URNS AUTO: ABNORMAL /HPF
RH FACTOR (ANTIGEN D): NORMAL
SP GR UR STRIP.AUTO: 1.02
SQUAMOUS #/AREA URNS AUTO: ABNORMAL /HPF
UROBILINOGEN UR STRIP.AUTO-MCNC: <2 MG/DL
WBC # BLD AUTO: 10.4 X10*3/UL (ref 4.4–11.3)
WBC #/AREA URNS AUTO: ABNORMAL /HPF

## 2024-01-26 PROCEDURE — 86901 BLOOD TYPING SEROLOGIC RH(D): CPT | Performed by: EMERGENCY MEDICINE

## 2024-01-26 PROCEDURE — 76816 OB US FOLLOW-UP PER FETUS: CPT | Performed by: RADIOLOGY

## 2024-01-26 PROCEDURE — 81001 URINALYSIS AUTO W/SCOPE: CPT | Performed by: EMERGENCY MEDICINE

## 2024-01-26 PROCEDURE — 99284 EMERGENCY DEPT VISIT MOD MDM: CPT | Performed by: EMERGENCY MEDICINE

## 2024-01-26 PROCEDURE — 84702 CHORIONIC GONADOTROPIN TEST: CPT | Performed by: EMERGENCY MEDICINE

## 2024-01-26 PROCEDURE — 76816 OB US FOLLOW-UP PER FETUS: CPT

## 2024-01-26 PROCEDURE — 36415 COLL VENOUS BLD VENIPUNCTURE: CPT | Performed by: EMERGENCY MEDICINE

## 2024-01-26 PROCEDURE — 85027 COMPLETE CBC AUTOMATED: CPT | Performed by: EMERGENCY MEDICINE

## 2024-01-26 RX ORDER — CEPHALEXIN 250 MG/1
500 CAPSULE ORAL 3 TIMES DAILY
Qty: 15 CAPSULE | Refills: 0 | Status: SHIPPED | OUTPATIENT
Start: 2024-01-26 | End: 2024-02-05 | Stop reason: WASHOUT

## 2024-01-26 ASSESSMENT — LIFESTYLE VARIABLES
REASON UNABLE TO ASSESS: NO
HAVE YOU EVER FELT YOU SHOULD CUT DOWN ON YOUR DRINKING: NO
EVER FELT BAD OR GUILTY ABOUT YOUR DRINKING: NO
HAVE PEOPLE ANNOYED YOU BY CRITICIZING YOUR DRINKING: NO
EVER HAD A DRINK FIRST THING IN THE MORNING TO STEADY YOUR NERVES TO GET RID OF A HANGOVER: NO

## 2024-01-26 ASSESSMENT — PAIN DESCRIPTION - FREQUENCY: FREQUENCY: CONSTANT/CONTINUOUS

## 2024-01-26 ASSESSMENT — PAIN DESCRIPTION - LOCATION: LOCATION: ABDOMEN

## 2024-01-26 ASSESSMENT — PAIN - FUNCTIONAL ASSESSMENT: PAIN_FUNCTIONAL_ASSESSMENT: 0-10

## 2024-01-26 ASSESSMENT — COLUMBIA-SUICIDE SEVERITY RATING SCALE - C-SSRS
1. IN THE PAST MONTH, HAVE YOU WISHED YOU WERE DEAD OR WISHED YOU COULD GO TO SLEEP AND NOT WAKE UP?: NO
6. HAVE YOU EVER DONE ANYTHING, STARTED TO DO ANYTHING, OR PREPARED TO DO ANYTHING TO END YOUR LIFE?: NO
2. HAVE YOU ACTUALLY HAD ANY THOUGHTS OF KILLING YOURSELF?: NO

## 2024-01-26 ASSESSMENT — PAIN DESCRIPTION - ORIENTATION: ORIENTATION: LOWER

## 2024-01-26 ASSESSMENT — PAIN DESCRIPTION - PAIN TYPE: TYPE: ACUTE PAIN

## 2024-01-26 ASSESSMENT — PAIN SCALES - GENERAL: PAINLEVEL_OUTOF10: 8

## 2024-01-26 ASSESSMENT — PAIN DESCRIPTION - ONSET: ONSET: GRADUAL

## 2024-01-26 ASSESSMENT — PAIN DESCRIPTION - DESCRIPTORS: DESCRIPTORS: CRAMPING

## 2024-01-26 ASSESSMENT — PAIN DESCRIPTION - PROGRESSION: CLINICAL_PROGRESSION: GRADUALLY WORSENING

## 2024-01-26 NOTE — ED PROVIDER NOTES
HPI   Chief Complaint   Patient presents with    Vaginal Discharge     15 weeks pregnant,     Abdominal Pain     Cramping lower       HPI:  23-year-old female approximately 15 weeks pregnant she is G3, P1 delivered by  who comes in with vaginal discharge and lower abdominal cramping pain that started this morning around 6:00 she took Tylenol but despite the Tylenol her crampy pain has persisted she is following with OB/GYN and sees the midwives.  She denies any los vaginal blood no vomiting or dehydration she is not concerned for any STDs does endorse urinary frequency that she has had this entire pregnancy does not believe she has a urinary tract infection    Limitations to history: None  Independent Historians: None  External Records Reviewed: EMR records  ------------------------------------------------------------------------------------------------------------------------------------------  ROS: a ten point review of systems was performed and negative except as per HPI.  ------------------------------------------------------------------------------------------------------------------------------------------  PMH / PSH: as per HPI, reviewed in EMR and discussed with the patient []  MEDS:  reviewed in EMR and discussed with the patient []  ALLERGIES: reviewed in EMR[]  SocH:  as per HPI, otherwise reviewed in EMR []  FH:  as per HPI, otherwise reviewed in EMR []  ------------------------------------------------------------------------------------------------------------------------------------------  Physical Exam:  VS: As documented in the triage note and EMR flowsheet from this visit was reviewed  General: Well appearing. No acute distress.   Eyes:  Extraocular movements grossly intact. No scleral icterus.   HEENT: Atraumatic. Normocephalic.    Neck: Supple. No gross masses  CV: RRR, audible S1/S2, 2+ symmetric peripheral pulses  Resp: Clear to auscultation bilaterally. No respiratory distress.   Non-labored respirations  GI: Low  scar well-healed soft, non-tender, non-distended, no rebound or gaurding  MSK: Symmetric muscle bulk. No gross step offs or deformities.  Skin: Warm, dry, no obvious rash.  Neuro: Speech fluent. Awake. Alert. Appropriate conversation.  Psych: Appropriate mood and affect for situation  ------------------------------------------------------------------------------------------------------------------------------------------  Hospital Course / Medical Decision Makin-year-old female G3, P1 about 15 weeks gestation presents with lower abdominal cramping pain.  She and urinary frequency.  She is well-appearing her abdominal exam was benign she had blood work obtained her H&H is normal her Rh+ pelvic exam was declined pelvic ultrasound showed a live IUP at 15 weeks and 4 days urine show +1 bacteria without any significant urinary tract infection patient was counseled regarding her workup here today she is feeling better without any significant cramping and have given her reassurance regarding her workup she understands to call her midwife for close follow-up appointment next week and a prescription for Keflex for 5 days was sent to her pharmacy of preference and she was discharged in stable condition advised to follow-up with her midwife    Patient care discussed with:  Social Determinants affecting care:     Final diagnosis and disposition: Abdominal pain in early pregnancy and bacteriuria in early pregnancy            Jeanna Fernandez MD                                      [place] Coma Scale Score: 15                  Patient History   Past Medical History:   Diagnosis Date    ADHD     Anxiety     Depression     Encounter for  screening for malformations 2022    Encounter for  screening for malformations    Migraine      Past Surgical History:   Procedure Laterality Date     SECTION, LOW TRANSVERSE      OTHER SURGICAL HISTORY  2022     Foot surgery    OTHER SURGICAL HISTORY  05/16/2022    Dilation and curettage     Family History   Problem Relation Name Age of Onset    Depression Mother      Blood clot Mother      Other (heart condition) Father      Heart disease Father      Asthma Sister      Sleep apnea Mother's Sister      Spina bifida Mother's Brother      Diabetes Maternal Grandmother      Gout Maternal Grandmother      Diabetes Maternal Grandfather      Kidney failure Maternal Grandfather      Spina bifida Other Cousin      Social History     Tobacco Use    Smoking status: Never    Smokeless tobacco: Never   Vaping Use    Vaping Use: Never used   Substance Use Topics    Alcohol use: Not Currently    Drug use: Never       Physical Exam   ED Triage Vitals [01/26/24 0850]   Temperature Heart Rate Respirations BP   36.3 °C (97.3 °F) 92 18 110/72      Pulse Ox Temp Source Heart Rate Source Patient Position   96 % Temporal Monitor Sitting      BP Location FiO2 (%)     Left arm --       Physical Exam    ED Course & MDM   Diagnoses as of 01/26/24 1246   Lower urinary tract infectious disease   Pelvic pain       Medical Decision Making      Procedure  Procedures     Jeanna Fernandez MD  01/26/24 1247

## 2024-01-29 ENCOUNTER — TELEPHONE (OUTPATIENT)
Dept: OBSTETRICS AND GYNECOLOGY | Facility: CLINIC | Age: 24
End: 2024-01-29

## 2024-01-29 NOTE — TELEPHONE ENCOUNTER
Patient calling she is 6 weeks pregnant and has bacteria in her urine and they gave her an antibiotic that stated it can cause diarrhea in babies with breastfeeding her baby has surgery tomorrow and she doesn't want to take something that can pass through the breastmilk and cause diarrhea or something towards the baby since they have surgery tomorrow. Is there another antibiotic she can take? They gave her Cephalexin. She went to the [facility] er over the weekend. Is there another antibiotic she can get called or what do you guys recommend? Thanks!

## 2024-02-02 ENCOUNTER — HOSPITAL ENCOUNTER (OUTPATIENT)
Dept: RADIOLOGY | Facility: CLINIC | Age: 24
Discharge: HOME | End: 2024-02-02
Payer: COMMERCIAL

## 2024-02-02 DIAGNOSIS — Z36.89 SCREENING, ANTENATAL, FOR FETAL ANATOMIC SURVEY (HHS-HCC): ICD-10-CM

## 2024-02-02 PROCEDURE — 76815 OB US LIMITED FETUS(S): CPT

## 2024-02-02 PROCEDURE — 76815 OB US LIMITED FETUS(S): CPT | Performed by: OBSTETRICS & GYNECOLOGY

## 2024-02-05 ENCOUNTER — OFFICE VISIT (OUTPATIENT)
Dept: PEDIATRIC CARDIOLOGY | Facility: HOSPITAL | Age: 24
End: 2024-02-05
Payer: COMMERCIAL

## 2024-02-05 ENCOUNTER — HOSPITAL ENCOUNTER (OUTPATIENT)
Dept: PEDIATRIC CARDIOLOGY | Facility: HOSPITAL | Age: 24
Discharge: HOME | End: 2024-02-05
Payer: COMMERCIAL

## 2024-02-05 ENCOUNTER — ROUTINE PRENATAL (OUTPATIENT)
Dept: OBSTETRICS AND GYNECOLOGY | Facility: CLINIC | Age: 24
End: 2024-02-05
Payer: COMMERCIAL

## 2024-02-05 VITALS
DIASTOLIC BLOOD PRESSURE: 74 MMHG | BODY MASS INDEX: 35.04 KG/M2 | HEIGHT: 63 IN | HEART RATE: 109 BPM | WEIGHT: 197.75 LBS | OXYGEN SATURATION: 99 % | SYSTOLIC BLOOD PRESSURE: 110 MMHG

## 2024-02-05 VITALS — DIASTOLIC BLOOD PRESSURE: 82 MMHG | WEIGHT: 197 LBS | SYSTOLIC BLOOD PRESSURE: 120 MMHG | BODY MASS INDEX: 33.81 KG/M2

## 2024-02-05 DIAGNOSIS — O28.3 ABNORMAL FETAL ULTRASOUND: ICD-10-CM

## 2024-02-05 DIAGNOSIS — Z3A.17 17 WEEKS GESTATION OF PREGNANCY (HHS-HCC): ICD-10-CM

## 2024-02-05 DIAGNOSIS — O35.8XX0 MATERNAL CARE FOR OTHER (SUSPECTED) FETAL ABNORMALITY AND DAMAGE, NOT APPLICABLE OR UNSPECIFIED (HHS-HCC): ICD-10-CM

## 2024-02-05 DIAGNOSIS — Z34.82 MULTIGRAVIDA IN SECOND TRIMESTER (HHS-HCC): Primary | ICD-10-CM

## 2024-02-05 DIAGNOSIS — O35.9XX0 SUSPECTED FETAL ANOMALY, ANTEPARTUM, SINGLE OR UNSPECIFIED FETUS (HHS-HCC): Primary | ICD-10-CM

## 2024-02-05 DIAGNOSIS — Z86.69 HISTORY OF MIGRAINE DURING PREGNANCY: ICD-10-CM

## 2024-02-05 DIAGNOSIS — Z87.59 HISTORY OF MIGRAINE DURING PREGNANCY: ICD-10-CM

## 2024-02-05 PROCEDURE — 99214 OFFICE O/P EST MOD 30 MIN: CPT | Performed by: PEDIATRICS

## 2024-02-05 PROCEDURE — 99204 OFFICE O/P NEW MOD 45 MIN: CPT | Performed by: PEDIATRICS

## 2024-02-05 PROCEDURE — 93325 DOPPLER ECHO COLOR FLOW MAPG: CPT | Performed by: PEDIATRICS

## 2024-02-05 PROCEDURE — 99213 OFFICE O/P EST LOW 20 MIN: CPT | Performed by: NURSE PRACTITIONER

## 2024-02-05 PROCEDURE — 76827 ECHO EXAM OF FETAL HEART: CPT

## 2024-02-05 PROCEDURE — 1036F TOBACCO NON-USER: CPT | Performed by: PEDIATRICS

## 2024-02-05 PROCEDURE — 76827 ECHO EXAM OF FETAL HEART: CPT | Performed by: PEDIATRICS

## 2024-02-05 RX ORDER — MAGNESIUM 200 MG
200 TABLET ORAL 2 TIMES DAILY
Qty: 60 TABLET | Refills: 11 | Status: SHIPPED | OUTPATIENT
Start: 2024-02-05

## 2024-02-05 NOTE — PROGRESS NOTES
Subjective   Patient ID [id number]   Babs Brown is a 23 y.o.  at 17w0d with a working estimated date of delivery of 7/15/2024, by Last Menstrual Period who presents for a routine prenatal visit. She denies vaginal bleeding, leakage of fluid, decreased fetal movements, or contractions.    Worsening migraines, not going away with tylenol.     Her pregnancy is complicated by:  Hx LTCS  --TOLAC r/f MFMU needed, plan to discuss further, around 20 wga  Family history of spinal bifida   -uncle, surgical repair   Obesity in pregnancy   BMI 34 at first visit     Objective   Physical Exam  Weight: 89.4 kg (197 lb)  Expected Total Weight Gain: Could not be calculated   Pregravid BMI: Could not be calculated  BP: 120/82  Fetal Heart Rate: 160 Fundal Height (cm): 18 cm    Prenatal Labs  Urine dip:  Lab Results   Component Value Date    KETONESU NEGATIVE 2024       Lab Results   Component Value Date    HGB 12.0 2024    HCT 34.3 (L) 2024    ABO B 2024    HEPBSAG Nonreactive 2023       Assessment/Plan   Diagnoses and all orders for this visit:  Multigravida in second trimester  17 weeks gestation of pregnancy  History of migraine during pregnancy  -     magnesium 200 mg tablet; Take 1 tablet (200 mg) by mouth 2 times a day.  -     Referral to Neurology; Future      Continue prenatal vitamin.  Labs reviewed.  Follow up in 4 weeks for a routine prenatal visit.  Fetal echo schedule and anatomy scan to follow.     Migraines- trial tylenol and Excedrin. Hx of relief with NSAIDs, unable to take in pregnancy.   Increase p.o fludis to 90 oz of water a day.   Start Magnesium supplements.   Need to updated glasses.     Luisa Farah, APRN-CNM, APRN-CNP

## 2024-02-05 NOTE — PATIENT INSTRUCTIONS
On fetal echocardiogram today the structure, function, and rhythm of your fetus' heart were normal.  Fetal echocardiogram is optimally performed between 20-22 weeks gestational age, as this is when the heart structures are large enough to be well seen, but the fetus´ bones are not too calcified to obscure the images.  Thus, we recommend that you come back in ~4-5 weeks for repeat imaging to ensure that everything still looks normal.  We will communicate these results with your obstetrician.    It was a pleasure to see you today.  We will see you back in 4-5 weeks.  However, we would be happy to see you sooner if new issues or concerns arise.  If you have any questions or concerns regarding this evaluation, please do not hesitate to contact me.    Germania Mejia MD   of Pediatrics  Division of Pediatric Cardiology  [facility] [place] [address]  Phone: [phone]  Fax: [phone]  e-mail: [email or web address]

## 2024-02-05 NOTE — PROGRESS NOTES
[facility]  [facility]  Division of Pediatric Cardiology  [facility]  [address], [address], [place] [address]  Tel: [phone], Fax [phone]      Clinical Nurse Midwife: Luisa Farah    Babs Brown was seen at the request of Dr. Quinn James for increased nuchal translucency.  Records were reviewed, and a summary of those records is integrated within the history of present illness.  A report with my findings is being sent via written or electronic means to the referring physician with my recommendations.    History obtained from: patient    History of Presentation   History of Present Illness:   Babs Brown is a 23 y.o. female presenting for initial prenatal cardiology consultation and fetal echocardiogram for increased nuchal translucency.  She is  and approximately 17w0d weeks pregnant (Patient's last menstrual period was 10/09/2023., Estimated Date of Delivery: 7/15/24) with a Male fetus.  Her obstetric history is significant for one full term delivery via  section.  Complications of her current pregnancy include obesity.  Ms. Babs Brown had a first trimester screen which demonstrated a nuchal translucency of 2.9mm.  Her level 2 obstetric ultrasound for anatomy was performed at 16 weeks gestational age and was normal.  She has undergone cell free fetal DNA testing and it was normal.  She has not had invasive genetic testing such as amniocentesis or chorionic villous sampling.  This pregnancy was not the result of in vitro fertilization.  She was not using potentially teratogenic medication at the time of conception.  There have been no other complications of this pregnancy.  Ms. Babs Brown plans to deliver her baby at [facility].    Ms. Babs Brown feels well today.  She denies any shortness of breath, abdominal  cramping, contractions, bleeding, or swelling of the extremities.  She notes frequent fetal movement.      Medical History     Medical Conditions:  Patient Active Problem List   Diagnosis    Family history of spina bifida    H/O  section    Obesity in pregnancy    Rubella non-immune status, antepartum    Maternal varicella, non-immune    History of migraine during pregnancy    Multigravida in second trimester    Suspected fetal anomaly, antepartum     Past Surgeries:  Past Surgical History:   Procedure Laterality Date     SECTION, LOW TRANSVERSE      OTHER SURGICAL HISTORY  2022    Foot surgery    OTHER SURGICAL HISTORY  2022    Dilation and curettage     Current Outpatient Medications   Medication Instructions    ferrous sulfate 65 mg, oral, 3 times daily with meals, Do not crush, chew, or split.    magnesium 200 mg, oral, 2 times daily    prenatal vitamin, iron-folic, (prenatal vit no.130-iron-folic) 27 mg iron-800 mcg folic acid tablet 1 tablet, oral, Daily    Vitamin B-6 25 mg, oral, Every 8 hours      Allergies:  Lactose and Topamax [topiramate]    Social History:  Patient lives with significant other and son.  Occupation: Stay at home mom  Smoking: None  Alcohol: None  Drug Use: None  She wears a seatbelt while in the car. She denies any verbal, sexual or physical abuse.     Cardiac Family History (for patient and father of baby):  Cousin's child born with congenital heart disease, otherwise, there is no history of early sudden/unexplained death including SIDS and drowning.  There is no history of cardiomyopathy of any type or heart transplant.  There is no history of arrhythmias/pacemaker/defibrillator or arrhythmia syndromes, including Long QT syndrome, Tobin-Parkinson-White syndrome or Brugada syndrome.  There is no history of heart attack or stroke before the age of 55 years in a close family member.  There is no history of Marfan syndrome or aortic aneurysm.  There is no  "history of deafness.  There is no history of syncope/fainting.  There is no history of high blood pressure or high cholesterol.  There is no history of DiGeorge Syndrome (22q11).    Physical Examination     /74 (BP Location: Right arm, Patient Position: Sitting, BP Cuff Size: Large adult)   Pulse 109   Ht 1.611 m (5' 3.43\")   Wt 89.7 kg (197 lb 12 oz)   LMP 10/09/2023   SpO2 99%   BMI 34.56 kg/m²     General: Alert, well-appearing and in no acute distress.    Abdomen: Soft, nontender, not distended. Gravid.  Extremities: No swelling or edema.  Neurologic / Psychiatric: Grossly intact without focal deficits.  Appropriate demeanor.      Results     Fetal Echocardiogram:    I ordered and interpreted a transabdominal fetal echocardiogram.  I performed a portion of the study myself.  The complete report is available under separate cover.  The results are summarized as follows:    Image quality: Fair (limited by gestational age).  Cardiac situs: Cardiac mass in the left chest.  Left ventricular apex points leftward.  Segmental anatomy: Atrio-ventricular concordance.  Ventriculo-arterial concordance.  Normally-related great arteries.  Superior vena cava: Normal connection to the right atrium.  Inferior vena cava: Normal connection to the right atrium.  Pulmonary veins: Not well visualized.  Atrial septum: Patent foramen ovale, with flap valve bowing into the left atrium.  Tricuspid valve: Structurally normal.  No obvious stenosis or insufficiency.  Mitral valve: Structurally normal.  No obvious stenosis or insufficiency.  Right ventricle: Normal ventricular size, wall thickness, and systolic function (qualitative).  Left ventricle: Normal ventricular size, wall thickness, and systolic function (qualitative).  Interventricular septum: No obvious septal defect.  Aortic valve: Structurally normal.  No obvious stenosis or insufficiency.  Pulmonary valve: Structurally normal.  No obvious stenosis or " insufficiency.  Pulmonary artery: Normal in size.  Ductal arch: Patent with right to left shunting.  Aortic arch: Left-sided.  Patent.  Pericardial effusion: None.  Umbilical arteries: Two umbilical arteries.  Normal arterial flow pattern.  Umbilical vein: Normal venous flow pattern.  Electrophysiology: Normal fetal heart rate and rhythm.  Normal mechanical AK interval.      Assessment & Plan   Assessment:  Ms. Babs Brown is a 23 y.o. old female who is  and currently at 17w0d weeks gestational age with a male  fetus.  A first trimester fetal echocardiogram was performed today for increased nuchal transluency.     Fetal echocardiogram today demonstrated grossly normal fetal cardiac anatomy, qualitatively normal fetal cardiac function, normal fetal heart rhythm, and no evidence of in utero congestive heart failure or hydrops fetalis.  I reviewed the results of today's evaluation, including the findings of the fetal echocardiogram, with Ms. Babs Brown in detail.  As a consequence of the small size of cardiac structures, image resolution at 11 to 14 weeks is typically less than that observed at later gestational ages; however, detailed segmental evaluations are still possible in the majority of fetuses, particularly at 12 to 16 weeks of gestation, with the aid of color Doppler. Furthermore, at these earlier gestational ages, growth of the fetal heart and great arteries is more accelerated than at later gestational ages; thus, the potential for evaluating anatomic details improves significantly every week. Given the limitations in image resolution with potential to miss more subtle cardiac lesions and the potential for the progression of lesions undetectable at earlier gestation, repeat mid-trimester (18-20 weeks gestation) assessment of all pregnancies evaluated before 15 to 16 weeks should be performed.    I discussed limitations of the technology of fetal echocardiography at this gestational age with  Ms. Babs Brown in detail.  I explained that fetal echocardiography cannot exclude all forms of congenital heart disease.  Fetal echocardiography may be insensitive to some defects of atrial and ventricular septation, minor valvular abnormalities, partial anomalous pulmonary venous return, and coarctation of the aorta.  In addition, normal fetal echocardiogram does not ensure that the fetal ductus arteriosus or foramen ovale will close.      Recommendations:  No changes to prenatal care.    Repeat fetal echocardiogram at 20-22 weeks gestation.    Triage code 0:        No changes to delivery planning.  Delivery per obstetrics at patient´s preferred hospital.  Standard  care per  team.        No pediatric cardiology consult needed after birth unless there are concerns/issues.    I spent greater than 60 minutes in performance of this consultation, of which greater than 50% was related to coordination of care or counseling.    It was a pleasure to see Ms. Babs Brown today.  If you have any questions or concerns regarding this evaluation, do not hesitate to contact me.      Germania Mejia MD, FACC, FAAP   of Pediatrics  Division of Pediatric Cardiology  [place] [facility] [place] [address]  Phone: [phone]  Fax: [phone]  e-mail: [email or web address]

## 2024-02-05 NOTE — LETTER
2024     Quinn James MD   [facility], [address]  [facility] [address]    Patient: Babs Brown   YOB: 2000   Date of Visit: 2024       Dear Dr. Quinn James MD:    Thank you for referring Babs Brown to me for evaluation. Below are my notes for this consultation.  If you have questions, please do not hesitate to call me. I look forward to following your patient along with you.       Sincerely,     Germania Mejia MD      CC: Luisa Farah, KUMAR-EDDIE, APRN-CNP  Magda Saldana, KUAMR-CNP  Maeve Cardoso, KUMAR-EDDIE, DNP  ______________________________________________________________________________________         [facility]  [facility]  Division of Pediatric Cardiology  [facility]  [address] [place] [address], [address] [place] [address]  Tel: [phone], Fax [phone]      Clinical Nurse Midwife: Luisa Farah    Babs Brown was seen at the request of Dr. Quinn James for increased nuchal translucency.  Records were reviewed, and a summary of those records is integrated within the history of present illness.  A report with my findings is being sent via written or electronic means to the referring physician with my recommendations.    History obtained from: patient    History of Presentation   History of Present Illness:   Babs Brown is a 23 y.o. female presenting for initial prenatal cardiology consultation and fetal echocardiogram for increased nuchal translucency.  She is  and approximately 17w0d weeks pregnant (Patient's last menstrual period was 10/09/2023., Estimated Date of Delivery: 7/15/24) with a Male fetus.  Her obstetric history is significant for one full term delivery via  section.  Complications of her current pregnancy include obesity.  Ms.  Babs Brown had a first trimester screen which demonstrated a nuchal translucency of 2.9mm.  Her level 2 obstetric ultrasound for anatomy was performed at 16 weeks gestational age and was normal.  She has undergone cell free fetal DNA testing and it was normal.  She has not had invasive genetic testing such as amniocentesis or chorionic villous sampling.  This pregnancy was not the result of in vitro fertilization.  She was not using potentially teratogenic medication at the time of conception.  There have been no other complications of this pregnancy.  Ms. Babs Brown plans to deliver her baby at [facility].    Ms. Babs Brown feels well today.  She denies any shortness of breath, abdominal cramping, contractions, bleeding, or swelling of the extremities.  She notes frequent fetal movement.      Medical History     Medical Conditions:  Patient Active Problem List   Diagnosis   • Family history of spina bifida   • H/O  section   • Obesity in pregnancy   • Rubella non-immune status, antepartum   • Maternal varicella, non-immune   • History of migraine during pregnancy   • Multigravida in second trimester   • Suspected fetal anomaly, antepartum     Past Surgeries:  Past Surgical History:   Procedure Laterality Date   •  SECTION, LOW TRANSVERSE     • OTHER SURGICAL HISTORY  2022    Foot surgery   • OTHER SURGICAL HISTORY  2022    Dilation and curettage     Current Outpatient Medications   Medication Instructions   • ferrous sulfate 65 mg, oral, 3 times daily with meals, Do not crush, chew, or split.   • magnesium 200 mg, oral, 2 times daily   • prenatal vitamin, iron-folic, (prenatal vit no.130-iron-folic) 27 mg iron-800 mcg folic acid tablet 1 tablet, oral, Daily   • Vitamin B-6 25 mg, oral, Every 8 hours      Allergies:  Lactose and Topamax [topiramate]    Social History:  Patient lives with significant other and son.  Occupation: Stay at home mom  Smoking:  "None  Alcohol: None  Drug Use: None  She wears a seatbelt while in the car. She denies any verbal, sexual or physical abuse.     Cardiac Family History (for patient and father of baby):  Cousin's child born with congenital heart disease, otherwise, there is no history of early sudden/unexplained death including SIDS and drowning.  There is no history of cardiomyopathy of any type or heart transplant.  There is no history of arrhythmias/pacemaker/defibrillator or arrhythmia syndromes, including Long QT syndrome, Tobin-Parkinson-White syndrome or Brugada syndrome.  There is no history of heart attack or stroke before the age of 55 years in a close family member.  There is no history of Marfan syndrome or aortic aneurysm.  There is no history of deafness.  There is no history of syncope/fainting.  There is no history of high blood pressure or high cholesterol.  There is no history of DiGeorge Syndrome (22q11).    Physical Examination     /74 (BP Location: Right arm, Patient Position: Sitting, BP Cuff Size: Large adult)   Pulse 109   Ht 1.611 m (5' 3.43\")   Wt 89.7 kg (197 lb 12 oz)   LMP 10/09/2023   SpO2 99%   BMI 34.56 kg/m²     General: Alert, well-appearing and in no acute distress.    Abdomen: Soft, nontender, not distended. Gravid.  Extremities: No swelling or edema.  Neurologic / Psychiatric: Grossly intact without focal deficits.  Appropriate demeanor.      Results     Fetal Echocardiogram:    I ordered and interpreted a transabdominal fetal echocardiogram.  I performed a portion of the study myself.  The complete report is available under separate cover.  The results are summarized as follows:    Image quality: Fair (limited by gestational age).  Cardiac situs: Cardiac mass in the left chest.  Left ventricular apex points leftward.  Segmental anatomy: Atrio-ventricular concordance.  Ventriculo-arterial concordance.  Normally-related great arteries.  Superior vena cava: Normal connection to the " right atrium.  Inferior vena cava: Normal connection to the right atrium.  Pulmonary veins: Not well visualized.  Atrial septum: Patent foramen ovale, with flap valve bowing into the left atrium.  Tricuspid valve: Structurally normal.  No obvious stenosis or insufficiency.  Mitral valve: Structurally normal.  No obvious stenosis or insufficiency.  Right ventricle: Normal ventricular size, wall thickness, and systolic function (qualitative).  Left ventricle: Normal ventricular size, wall thickness, and systolic function (qualitative).  Interventricular septum: No obvious septal defect.  Aortic valve: Structurally normal.  No obvious stenosis or insufficiency.  Pulmonary valve: Structurally normal.  No obvious stenosis or insufficiency.  Pulmonary artery: Normal in size.  Ductal arch: Patent with right to left shunting.  Aortic arch: Left-sided.  Patent.  Pericardial effusion: None.  Umbilical arteries: Two umbilical arteries.  Normal arterial flow pattern.  Umbilical vein: Normal venous flow pattern.  Electrophysiology: Normal fetal heart rate and rhythm.  Normal mechanical MI interval.      Assessment & Plan   Assessment:  Ms. Babs Brown is a 23 y.o. old female who is  and currently at 17w0d weeks gestational age with a male  fetus.  A first trimester fetal echocardiogram was performed today for increased nuchal transluency.     Fetal echocardiogram today demonstrated grossly normal fetal cardiac anatomy, qualitatively normal fetal cardiac function, normal fetal heart rhythm, and no evidence of in utero congestive heart failure or hydrops fetalis.  I reviewed the results of today's evaluation, including the findings of the fetal echocardiogram, with Ms. Babs Brown in detail.  As a consequence of the small size of cardiac structures, image resolution at 11 to 14 weeks is typically less than that observed at later gestational ages; however, detailed segmental evaluations are still possible in the  majority of fetuses, particularly at 12 to 16 weeks of gestation, with the aid of color Doppler. Furthermore, at these earlier gestational ages, growth of the fetal heart and great arteries is more accelerated than at later gestational ages; thus, the potential for evaluating anatomic details improves significantly every week. Given the limitations in image resolution with potential to miss more subtle cardiac lesions and the potential for the progression of lesions undetectable at earlier gestation, repeat mid-trimester (18-20 weeks gestation) assessment of all pregnancies evaluated before 15 to 16 weeks should be performed.    I discussed limitations of the technology of fetal echocardiography at this gestational age with Ms. Babs Brown in detail.  I explained that fetal echocardiography cannot exclude all forms of congenital heart disease.  Fetal echocardiography may be insensitive to some defects of atrial and ventricular septation, minor valvular abnormalities, partial anomalous pulmonary venous return, and coarctation of the aorta.  In addition, normal fetal echocardiogram does not ensure that the fetal ductus arteriosus or foramen ovale will close.      Recommendations:  No changes to prenatal care.    Repeat fetal echocardiogram at 20-22 weeks gestation.    Triage code 0:        No changes to delivery planning.  Delivery per obstetrics at patient´s preferred hospital.  Standard  care per  team.        No pediatric cardiology consult needed after birth unless there are concerns/issues.    I spent greater than 60 minutes in performance of this consultation, of which greater than 50% was related to coordination of care or counseling.    It was a pleasure to see MsImmanuel Babs Brown today.  If you have any questions or concerns regarding this evaluation, do not hesitate to contact me.      Germania Mejia MD, FACC, FAAP   of Pediatrics  Division of Pediatric  Cardiology  [facility], [place], [place] [address]  Phone: [phone]  Fax: [phone]  e-mail: [email or web address]

## 2024-02-06 NOTE — TELEPHONE ENCOUNTER
PLEASE CALL [facility] AT [facility] [employer] REGARDING THE DOCUMENT OF PREGNANCY.    PLEASE CALL [facility] [phone]    SHE HAS PERMISSION FROM BENITO TO SPEAK WITH YOU.

## 2024-02-21 ENCOUNTER — HOSPITAL ENCOUNTER (OUTPATIENT)
Dept: RADIOLOGY | Facility: CLINIC | Age: 24
Discharge: HOME | End: 2024-02-21
Payer: COMMERCIAL

## 2024-02-21 DIAGNOSIS — Z36.89 SCREENING, ANTENATAL, FOR FETAL ANATOMIC SURVEY (HHS-HCC): ICD-10-CM

## 2024-02-21 PROBLEM — O28.3 NUCHAL FOLD THICKENING ON PRENATAL ULTRASOUND: Status: ACTIVE | Noted: 2024-02-21

## 2024-02-21 PROCEDURE — 76811 OB US DETAILED SNGL FETUS: CPT | Performed by: OBSTETRICS & GYNECOLOGY

## 2024-02-21 PROCEDURE — 76811 OB US DETAILED SNGL FETUS: CPT

## 2024-02-27 ENCOUNTER — HOSPITAL ENCOUNTER (OUTPATIENT)
Facility: HOSPITAL | Age: 24
End: 2024-02-27
Attending: OBSTETRICS & GYNECOLOGY | Admitting: OBSTETRICS & GYNECOLOGY
Payer: COMMERCIAL

## 2024-02-27 ENCOUNTER — HOSPITAL ENCOUNTER (OUTPATIENT)
Facility: HOSPITAL | Age: 24
Discharge: HOME | End: 2024-02-27
Attending: OBSTETRICS & GYNECOLOGY | Admitting: OBSTETRICS & GYNECOLOGY
Payer: COMMERCIAL

## 2024-02-27 ENCOUNTER — TELEPHONE (OUTPATIENT)
Dept: OBSTETRICS AND GYNECOLOGY | Facility: CLINIC | Age: 24
End: 2024-02-27

## 2024-02-27 VITALS
WEIGHT: 203.26 LBS | SYSTOLIC BLOOD PRESSURE: 109 MMHG | BODY MASS INDEX: 36.02 KG/M2 | RESPIRATION RATE: 17 BRPM | TEMPERATURE: 97.3 F | HEIGHT: 63 IN | HEART RATE: 91 BPM | OXYGEN SATURATION: 99 % | DIASTOLIC BLOOD PRESSURE: 59 MMHG

## 2024-02-27 DIAGNOSIS — G43.801 OTHER MIGRAINE WITH STATUS MIGRAINOSUS, NOT INTRACTABLE: Primary | ICD-10-CM

## 2024-02-27 LAB
ABO GROUP (TYPE) IN BLOOD: NORMAL
ALBUMIN SERPL BCP-MCNC: 3.3 G/DL (ref 3.4–5)
ALP SERPL-CCNC: 69 U/L (ref 33–110)
ALT SERPL W P-5'-P-CCNC: 8 U/L (ref 7–45)
ANION GAP SERPL CALC-SCNC: 12 MMOL/L (ref 10–20)
ANTIBODY SCREEN: NORMAL
AST SERPL W P-5'-P-CCNC: 12 U/L (ref 9–39)
BASOPHILS # BLD AUTO: 0.03 X10*3/UL (ref 0–0.1)
BASOPHILS NFR BLD AUTO: 0.3 %
BILIRUB SERPL-MCNC: 0.2 MG/DL (ref 0–1.2)
BUN SERPL-MCNC: 9 MG/DL (ref 6–23)
CALCIUM SERPL-MCNC: 9.1 MG/DL (ref 8.6–10.3)
CHLORIDE SERPL-SCNC: 102 MMOL/L (ref 98–107)
CO2 SERPL-SCNC: 25 MMOL/L (ref 21–32)
CREAT SERPL-MCNC: 0.42 MG/DL (ref 0.5–1.05)
EGFRCR SERPLBLD CKD-EPI 2021: >90 ML/MIN/1.73M*2
EOSINOPHIL # BLD AUTO: 0.13 X10*3/UL (ref 0–0.7)
EOSINOPHIL NFR BLD AUTO: 1.2 %
ERYTHROCYTE [DISTWIDTH] IN BLOOD BY AUTOMATED COUNT: 13.2 % (ref 11.5–14.5)
GLUCOSE SERPL-MCNC: 93 MG/DL (ref 74–99)
HCT VFR BLD AUTO: 29.8 % (ref 36–46)
HGB BLD-MCNC: 10.2 G/DL (ref 12–16)
IMM GRANULOCYTES # BLD AUTO: 0.04 X10*3/UL (ref 0–0.7)
IMM GRANULOCYTES NFR BLD AUTO: 0.4 % (ref 0–0.9)
LDH SERPL L TO P-CCNC: 147 U/L (ref 84–246)
LYMPHOCYTES # BLD AUTO: 3.03 X10*3/UL (ref 1.2–4.8)
LYMPHOCYTES NFR BLD AUTO: 27.4 %
MCH RBC QN AUTO: 29.5 PG (ref 26–34)
MCHC RBC AUTO-ENTMCNC: 34.2 G/DL (ref 32–36)
MCV RBC AUTO: 86 FL (ref 80–100)
MONOCYTES # BLD AUTO: 0.56 X10*3/UL (ref 0.1–1)
MONOCYTES NFR BLD AUTO: 5.1 %
NEUTROPHILS # BLD AUTO: 7.27 X10*3/UL (ref 1.2–7.7)
NEUTROPHILS NFR BLD AUTO: 65.6 %
NRBC BLD-RTO: 0 /100 WBCS (ref 0–0)
PLATELET # BLD AUTO: 271 X10*3/UL (ref 150–450)
POTASSIUM SERPL-SCNC: 3.8 MMOL/L (ref 3.5–5.3)
PROT SERPL-MCNC: 6.1 G/DL (ref 6.4–8.2)
RBC # BLD AUTO: 3.46 X10*6/UL (ref 4–5.2)
RH FACTOR (ANTIGEN D): NORMAL
SODIUM SERPL-SCNC: 135 MMOL/L (ref 136–145)
WBC # BLD AUTO: 11.1 X10*3/UL (ref 4.4–11.3)

## 2024-02-27 PROCEDURE — 99214 OFFICE O/P EST MOD 30 MIN: CPT

## 2024-02-27 PROCEDURE — 80053 COMPREHEN METABOLIC PANEL: CPT | Performed by: ADVANCED PRACTICE MIDWIFE

## 2024-02-27 PROCEDURE — 86900 BLOOD TYPING SEROLOGIC ABO: CPT | Performed by: ADVANCED PRACTICE MIDWIFE

## 2024-02-27 PROCEDURE — 2500000004 HC RX 250 GENERAL PHARMACY W/ HCPCS (ALT 636 FOR OP/ED): Performed by: ADVANCED PRACTICE MIDWIFE

## 2024-02-27 PROCEDURE — 99215 OFFICE O/P EST HI 40 MIN: CPT | Performed by: ADVANCED PRACTICE MIDWIFE

## 2024-02-27 PROCEDURE — 36415 COLL VENOUS BLD VENIPUNCTURE: CPT | Performed by: ADVANCED PRACTICE MIDWIFE

## 2024-02-27 PROCEDURE — 85025 COMPLETE CBC W/AUTO DIFF WBC: CPT | Performed by: ADVANCED PRACTICE MIDWIFE

## 2024-02-27 PROCEDURE — 83615 LACTATE (LD) (LDH) ENZYME: CPT | Performed by: ADVANCED PRACTICE MIDWIFE

## 2024-02-27 RX ORDER — METOCLOPRAMIDE HYDROCHLORIDE 5 MG/ML
10 INJECTION INTRAMUSCULAR; INTRAVENOUS ONCE
Status: COMPLETED | OUTPATIENT
Start: 2024-02-27 | End: 2024-02-27

## 2024-02-27 RX ORDER — DIPHENHYDRAMINE HCL 25 MG
25 CAPSULE ORAL EVERY 6 HOURS PRN
Qty: 30 CAPSULE | Refills: 0 | Status: SHIPPED | OUTPATIENT
Start: 2024-02-27 | End: 2024-05-16 | Stop reason: ALTCHOICE

## 2024-02-27 RX ORDER — ONDANSETRON HYDROCHLORIDE 2 MG/ML
4 INJECTION, SOLUTION INTRAVENOUS EVERY 6 HOURS PRN
Status: DISCONTINUED | OUTPATIENT
Start: 2024-02-27 | End: 2024-02-27 | Stop reason: HOSPADM

## 2024-02-27 RX ORDER — ONDANSETRON 4 MG/1
4 TABLET, FILM COATED ORAL EVERY 6 HOURS PRN
Status: DISCONTINUED | OUTPATIENT
Start: 2024-02-27 | End: 2024-02-27 | Stop reason: HOSPADM

## 2024-02-27 RX ORDER — ACETAMINOPHEN 325 MG/1
975 TABLET ORAL ONCE
Status: COMPLETED | OUTPATIENT
Start: 2024-02-27 | End: 2024-02-27

## 2024-02-27 RX ORDER — NIFEDIPINE 10 MG/1
10 CAPSULE ORAL ONCE AS NEEDED
Status: DISCONTINUED | OUTPATIENT
Start: 2024-02-27 | End: 2024-02-27 | Stop reason: HOSPADM

## 2024-02-27 RX ORDER — HYDRALAZINE HYDROCHLORIDE 20 MG/ML
5 INJECTION INTRAMUSCULAR; INTRAVENOUS ONCE AS NEEDED
Status: DISCONTINUED | OUTPATIENT
Start: 2024-02-27 | End: 2024-02-27 | Stop reason: HOSPADM

## 2024-02-27 RX ORDER — LABETALOL HYDROCHLORIDE 5 MG/ML
20 INJECTION, SOLUTION INTRAVENOUS ONCE AS NEEDED
Status: DISCONTINUED | OUTPATIENT
Start: 2024-02-27 | End: 2024-02-27 | Stop reason: HOSPADM

## 2024-02-27 RX ORDER — ACETAMINOPHEN 500 MG
1000 TABLET ORAL EVERY 6 HOURS PRN
Qty: 30 TABLET | Refills: 0 | Status: SHIPPED | OUTPATIENT
Start: 2024-02-27

## 2024-02-27 RX ORDER — METOCLOPRAMIDE 10 MG/1
10 TABLET ORAL EVERY 6 HOURS PRN
Qty: 60 TABLET | Refills: 0 | Status: SHIPPED | OUTPATIENT
Start: 2024-02-27 | End: 2024-04-01 | Stop reason: WASHOUT

## 2024-02-27 RX ORDER — LIDOCAINE HYDROCHLORIDE 10 MG/ML
0.5 INJECTION INFILTRATION; PERINEURAL ONCE AS NEEDED
Status: DISCONTINUED | OUTPATIENT
Start: 2024-02-27 | End: 2024-02-27 | Stop reason: HOSPADM

## 2024-02-27 RX ORDER — DIPHENHYDRAMINE HYDROCHLORIDE 50 MG/ML
25 INJECTION INTRAMUSCULAR; INTRAVENOUS ONCE
Status: COMPLETED | OUTPATIENT
Start: 2024-02-27 | End: 2024-02-27

## 2024-02-27 RX ADMIN — SODIUM CHLORIDE, SODIUM LACTATE, POTASSIUM CHLORIDE, AND CALCIUM CHLORIDE 1000 ML: 600; 310; 30; 20 INJECTION, SOLUTION INTRAVENOUS at 19:45

## 2024-02-27 RX ADMIN — METOCLOPRAMIDE 10 MG: 5 INJECTION, SOLUTION INTRAMUSCULAR; INTRAVENOUS at 19:23

## 2024-02-27 RX ADMIN — ACETAMINOPHEN 975 MG: 325 TABLET ORAL at 19:50

## 2024-02-27 RX ADMIN — DIPHENHYDRAMINE HYDROCHLORIDE 25 MG: 50 INJECTION INTRAMUSCULAR; INTRAVENOUS at 19:25

## 2024-02-27 SDOH — SOCIAL STABILITY: SOCIAL INSECURITY: DOES ANYONE TRY TO KEEP YOU FROM HAVING/CONTACTING OTHER FRIENDS OR DOING THINGS OUTSIDE YOUR HOME?: NO

## 2024-02-27 SDOH — HEALTH STABILITY: MENTAL HEALTH: WERE YOU ABLE TO COMPLETE ALL THE BEHAVIORAL HEALTH SCREENINGS?: YES

## 2024-02-27 SDOH — SOCIAL STABILITY: SOCIAL INSECURITY: ABUSE SCREEN: ADULT

## 2024-02-27 SDOH — HEALTH STABILITY: MENTAL HEALTH: HAVE YOU USED ANY SUBSTANCES (CANABIS, COCAINE, HEROIN, HALLUCINOGENS, INHALANTS, ETC.) IN THE PAST 12 MONTHS?: NO

## 2024-02-27 SDOH — SOCIAL STABILITY: SOCIAL INSECURITY: PHYSICAL ABUSE: DENIES

## 2024-02-27 SDOH — SOCIAL STABILITY: SOCIAL INSECURITY: ARE YOU OR HAVE YOU BEEN THREATENED OR ABUSED PHYSICALLY, EMOTIONALLY, OR SEXUALLY BY ANYONE?: NO

## 2024-02-27 SDOH — SOCIAL STABILITY: SOCIAL INSECURITY: HAVE YOU HAD THOUGHTS OF HARMING ANYONE ELSE?: NO

## 2024-02-27 SDOH — HEALTH STABILITY: MENTAL HEALTH: HAVE YOU USED ANY PRESCRIPTION DRUGS OTHER THAN PRESCRIBED IN THE PAST 12 MONTHS?: NO

## 2024-02-27 SDOH — HEALTH STABILITY: MENTAL HEALTH: NON-SPECIFIC ACTIVE SUICIDAL THOUGHTS (PAST 1 MONTH): NO

## 2024-02-27 SDOH — SOCIAL STABILITY: SOCIAL INSECURITY: HAS ANYONE EVER THREATENED TO HURT YOUR FAMILY OR YOUR PETS?: NO

## 2024-02-27 SDOH — ECONOMIC STABILITY: HOUSING INSECURITY: DO YOU FEEL UNSAFE GOING BACK TO THE PLACE WHERE YOU ARE LIVING?: NO

## 2024-02-27 SDOH — HEALTH STABILITY: MENTAL HEALTH: SUICIDAL BEHAVIOR (LIFETIME): NO

## 2024-02-27 SDOH — SOCIAL STABILITY: SOCIAL INSECURITY: ARE THERE ANY APPARENT SIGNS OF INJURIES/BEHAVIORS THAT COULD BE RELATED TO ABUSE/NEGLECT?: NO

## 2024-02-27 SDOH — HEALTH STABILITY: MENTAL HEALTH: WISH TO BE DEAD (PAST 1 MONTH): NO

## 2024-02-27 SDOH — SOCIAL STABILITY: SOCIAL INSECURITY: VERBAL ABUSE: DENIES

## 2024-02-27 SDOH — SOCIAL STABILITY: SOCIAL INSECURITY: DO YOU FEEL ANYONE HAS EXPLOITED OR TAKEN ADVANTAGE OF YOU FINANCIALLY OR OF YOUR PERSONAL PROPERTY?: NO

## 2024-02-27 ASSESSMENT — PAIN SCALES - GENERAL
PAINLEVEL_OUTOF10: 10 - WORST POSSIBLE PAIN
PAINLEVEL_OUTOF10: 0 - NO PAIN

## 2024-02-27 ASSESSMENT — PATIENT HEALTH QUESTIONNAIRE - PHQ9
SUM OF ALL RESPONSES TO PHQ9 QUESTIONS 1 & 2: 0
1. LITTLE INTEREST OR PLEASURE IN DOING THINGS: NOT AT ALL
2. FEELING DOWN, DEPRESSED OR HOPELESS: NOT AT ALL

## 2024-02-27 ASSESSMENT — LIFESTYLE VARIABLES
AUDIT-C TOTAL SCORE: 0
HOW OFTEN DO YOU HAVE A DRINK CONTAINING ALCOHOL: NEVER
AUDIT-C TOTAL SCORE: 0
HOW MANY STANDARD DRINKS CONTAINING ALCOHOL DO YOU HAVE ON A TYPICAL DAY: PATIENT DOES NOT DRINK
HOW OFTEN DO YOU HAVE 6 OR MORE DRINKS ON ONE OCCASION: NEVER
SKIP TO QUESTIONS 9-10: 1

## 2024-02-27 NOTE — TELEPHONE ENCOUNTER
Patient called stating she has had a migraine all day. She took Tylenol and a shower to help relieve it. She tried the freezer headache hat that did not work. She is 20w1d. Pain is now 9/10. Please advise

## 2024-02-28 NOTE — TELEPHONE ENCOUNTER
Pt was seen in [facility] L&D triage yesterday for migraine that was not improved with interventions at home. Pt states she picked up her script from the pharmacy this morning and the pharmacist told her that the Benadryl/Tylenol/Reglan prescribed may not be safe in pregnancy. Pt was calling in to confirm that this is safe. Per Monique's note (CNM that saw Babs at [facility]), pt advised that these medications are safe to take as long as she's taking as prescribed. Pt agreeable with plan of care with no further questions.

## 2024-02-28 NOTE — H&P
"Obstetrical Triage Note    Reason for Triage Observation: Headache    Assessment   headaches  Prior history of migraine treated by neurologist in [place] prior to first pregnancy. No diagnostic reason known, treated with meds that were stopped with first pregnancy.  Patient's complaints have resolved with treatment     Plan   Discharge home.     Subjective   History of Present Pregnancy  Babs Brown is a 23 y.o.  gravid, female. Patient's last menstrual period was 10/09/2023. with an Estimated Date of Delivery of 7/15/2024, by Last Menstrual Period, who is now 20w1d gestation. The patient's blood type is B POS.  Pt has used Excedrin, Tylenol, ice cap with no improvement of symptoms    Pregnancy notable for: Previous csection for TOLAC,     Triage Course:  IV fluids ordered  Reglan/Benadryl ordered  CBC, Type and screen, CMP, LDH ordered    Addendum: tylenol 975 po ordered x one dose. Last dose taken was at noon per patient.         Disposition:  Patients headache has resolved completely in triage  Dc'd home with script for tylenol, benadryl and reglan  Patient to follow up with primary OB and neurology      Objective   Recent Vital Signs:                                 /75   Pulse 94   Temp 36.3 °C (97.3 °F) (Temporal)   Resp 16   Ht 1.6 m (5' 3\")   Wt 92.2 kg (203 lb 4.2 oz)   BMI 36.01 kg/m²     BP & Temp Min/Max Last 24 Hours:     BP  Min: 119/75   Min taken time: 24  Max: 119/75   Max taken time: 24  Temp  Av.3 °C (97.3 °F)  Min: 36.3 °C (97.3 °F)   Min taken time: 24  Max: 36.3 °C (97.3 °F)   Max taken time: 24    Physical Examination:  Constitutional: Alert and in no acute distress. Well developed, well nourished.  Head and Face: Head and face: Normal.   External inspection of ears and nose: Normal.  Pulmonary: Normal respiratory effort.  Psychiatric: Alert and oriented x 3. Affect normal to patient baseline. Mood: Appropriate  Fetal: "         Lab Review:   Labs in chart were reviewed.

## 2024-03-01 ENCOUNTER — APPOINTMENT (OUTPATIENT)
Dept: RADIOLOGY | Facility: CLINIC | Age: 24
End: 2024-03-01
Payer: COMMERCIAL

## 2024-03-04 ENCOUNTER — TELEMEDICINE (OUTPATIENT)
Dept: OBSTETRICS AND GYNECOLOGY | Facility: CLINIC | Age: 24
End: 2024-03-04
Payer: COMMERCIAL

## 2024-03-04 DIAGNOSIS — O99.012 ANEMIA AFFECTING PREGNANCY IN SECOND TRIMESTER (HHS-HCC): ICD-10-CM

## 2024-03-04 DIAGNOSIS — Z98.891 H/O CESAREAN SECTION: ICD-10-CM

## 2024-03-04 DIAGNOSIS — Z28.39 RUBELLA NON-IMMUNE STATUS, ANTEPARTUM (HHS-HCC): ICD-10-CM

## 2024-03-04 DIAGNOSIS — Z82.79 FAMILY HISTORY OF SPINA BIFIDA: ICD-10-CM

## 2024-03-04 DIAGNOSIS — O35.9XX1 SUSPECTED FETAL ANOMALY, ANTEPARTUM, FETUS 1 OF MULTIPLE GESTATION (HHS-HCC): ICD-10-CM

## 2024-03-04 DIAGNOSIS — O28.3 NUCHAL FOLD THICKENING ON PRENATAL ULTRASOUND: Primary | ICD-10-CM

## 2024-03-04 DIAGNOSIS — Z87.59 HISTORY OF MIGRAINE DURING PREGNANCY: ICD-10-CM

## 2024-03-04 DIAGNOSIS — O99.210 OBESITY IN PREGNANCY (HHS-HCC): ICD-10-CM

## 2024-03-04 DIAGNOSIS — Z86.69 HISTORY OF MIGRAINE DURING PREGNANCY: ICD-10-CM

## 2024-03-04 DIAGNOSIS — Z34.82 MULTIGRAVIDA IN SECOND TRIMESTER (HHS-HCC): ICD-10-CM

## 2024-03-04 DIAGNOSIS — O09.899 RUBELLA NON-IMMUNE STATUS, ANTEPARTUM (HHS-HCC): ICD-10-CM

## 2024-03-04 DIAGNOSIS — O09.899 MATERNAL VARICELLA, NON-IMMUNE (HHS-HCC): ICD-10-CM

## 2024-03-04 DIAGNOSIS — Z3A.21 21 WEEKS GESTATION OF PREGNANCY (HHS-HCC): ICD-10-CM

## 2024-03-04 DIAGNOSIS — Z28.39 MATERNAL VARICELLA, NON-IMMUNE (HHS-HCC): ICD-10-CM

## 2024-03-04 PROCEDURE — 99213 OFFICE O/P EST LOW 20 MIN: CPT | Performed by: NURSE PRACTITIONER

## 2024-03-04 PROCEDURE — 1036F TOBACCO NON-USER: CPT | Performed by: NURSE PRACTITIONER

## 2024-03-04 RX ORDER — FERROUS SULFATE 325(65) MG
325 TABLET ORAL 2 TIMES DAILY
Qty: 60 TABLET | Refills: 11 | Status: SHIPPED | OUTPATIENT
Start: 2024-03-04 | End: 2024-05-23 | Stop reason: ALTCHOICE

## 2024-03-04 NOTE — PROGRESS NOTES
Virtual or Telephone Consent    An interactive audio and video telecommunication system which permits real time communications between the patient (at the originating site) and provider (at the distant site) was utilized to provide this telehealth service.   Verbal consent was requested and obtained from Babs Brown on this date, 24 for a telehealth visit.      Subjective   Patient ID [id number]   Babs Brown is a 23 y.o.  at 21w0d with a working estimated date of delivery of 7/15/2024, by Last Menstrual Period who presents for a routine prenatal visit. She denies vaginal bleeding, leakage of fluid, decreased fetal movements, or contractions.    Worsening migraines, not going away with tylenol followed in Triage with Neurology. Follow up from the triage, Negative HELLP evaluation last week.     Her pregnancy is complicated by:  Hx LTCS  --TOLAC r/f [facility]   --anterior placenta   Family history of spinal bifida   -uncle, surgical repair   Obesity in pregnancy   BMI 34 at first visit   Migraines in pregnancy-   -->Taking Tylenol/Reglan and Benadryl   -->Referred to neurology       Objective   Physical Exam     Expected Total Weight Gain: Could not be calculated   Pregravid BMI: Could not be calculated            Prenatal Labs  Urine dip:  Lab Results   Component Value Date    KETONESU NEGATIVE 2024       Lab Results   Component Value Date    HGB 10.2 (L) 2024    HCT 29.8 (L) 2024    ABO B 2024    HEPBSAG Nonreactive 2023       Assessment/Plan   Diagnoses and all orders for this visit:  Nuchal fold thickening on prenatal ultrasound  Anemia affecting pregnancy in second trimester  Comments:  Start Iron supplement second trimester   25 week check retic count with CBC  Orders:  -     ferrous sulfate, 325 mg ferrous sulfate, (iron) tablet; Take 1 tablet by mouth 2 times a day.  H/O  section  Obesity in pregnancy  History of migraine during  pregnancy  Comments:  Referred to Neurology  Maternal varicella, non-immune  Suspected fetal anomaly, antepartum, fetus 1 of multiple gestation  21 weeks gestation of pregnancy  Rubella non-immune status, antepartum  Family history of spina bifida  Multigravida in second trimester    Continue prenatal vitamin.  Labs reviewed.  Follow up in 4 weeks for a routine prenatal visit.  Fetal echo schedule and anatomy scan to follow.     Migraines- trial tylenol and Excedrin. Hx of relief with NSAIDs, unable to take in pregnancy.   Increase p.o fludis to 90 oz of water a day.   Start Magnesium supplements, continue daily.   Need to updated glasses.   Follow up with Neurology planned     Started on Iron, recheck labs in 4 weeks.       Luisa Farah, APRN-CNM, APRN-CNP

## 2024-03-08 ENCOUNTER — TELEPHONE (OUTPATIENT)
Dept: OBSTETRICS AND GYNECOLOGY | Facility: CLINIC | Age: 24
End: 2024-03-08
Payer: COMMERCIAL

## 2024-03-08 NOTE — TELEPHONE ENCOUNTER
"Pt called OBGYN office c/o stomach pain/tightening and headache/migraine that isn't relieved with \"migraine concoction\" she was prescribed in L&D triage. Pt denies LOF/vaginal bleeding. Pt states she thinks she feels good fetal movement. Will talk to CNM on call and call pt back. Pt agreeable with plan of care with no further questions.    "

## 2024-03-14 ENCOUNTER — ROUTINE PRENATAL (OUTPATIENT)
Dept: OBSTETRICS AND GYNECOLOGY | Facility: CLINIC | Age: 24
End: 2024-03-14
Payer: COMMERCIAL

## 2024-03-14 VITALS — DIASTOLIC BLOOD PRESSURE: 82 MMHG | WEIGHT: 204 LBS | SYSTOLIC BLOOD PRESSURE: 112 MMHG | BODY MASS INDEX: 36.14 KG/M2

## 2024-03-14 DIAGNOSIS — Z28.39 RUBELLA NON-IMMUNE STATUS, ANTEPARTUM (HHS-HCC): ICD-10-CM

## 2024-03-14 DIAGNOSIS — O34.219 UTERINE SCAR FROM PREVIOUS CESAREAN DELIVERY AFFECTING PREGNANCY (HHS-HCC): Primary | ICD-10-CM

## 2024-03-14 DIAGNOSIS — Z3A.22 22 WEEKS GESTATION OF PREGNANCY (HHS-HCC): ICD-10-CM

## 2024-03-14 DIAGNOSIS — O09.899 RUBELLA NON-IMMUNE STATUS, ANTEPARTUM (HHS-HCC): ICD-10-CM

## 2024-03-14 DIAGNOSIS — Z82.79 FAMILY HISTORY OF SPINA BIFIDA: ICD-10-CM

## 2024-03-14 DIAGNOSIS — Z34.82 MULTIGRAVIDA IN SECOND TRIMESTER (HHS-HCC): ICD-10-CM

## 2024-03-14 PROCEDURE — 99213 OFFICE O/P EST LOW 20 MIN: CPT | Performed by: MIDWIFE

## 2024-03-14 NOTE — PROGRESS NOTES
"Subjective   Patient ID [id number]   Babs Brown is a 23 y.o.  at 22w3d with a working estimated date of delivery of 7/15/2024, by Last Menstrual Period who presents with her partner and son for a routine prenatal visit. She endorses daily fetal movement and is no longer having h/v, urinary discomfort, vaginal bleeding or LOF. She has decreased caffeine intake and notes that HA frequency has improved, but she continues to have them occasionally. She is scheduled for a neurology consult next month.     Her pregnancy is complicated by:  -previous LTCS with short interval pregnancies   -desires TOLAC  -FH spina bifida, uncle w/ surgical repair  -obesity  -migraines   -Rubella non-immune    She had a question related to a specialist follow up but was uncertain as to who or for what reason, chart review notes a slightly thickened NT with no other soft markers and no recommendation for genetics or otherwise. She is s/p normal fetal echocardiogram with no note for follow up indicated. She is scheduled for a 30-week growth US in [facility] so this can be better reviewed with MFM PRN, but at this time there does not seem to be an additional pending follow up necessary.     Objective   Physical Exam: NAD, easy respiratory effort, CN grossly intact, no edema; alert & oriented @ baseline   Weight: 92.5 kg (204 lb)  Expected Total Weight Gain: Could not be calculated   Pregravid BMI: Could not be calculated  BP: 112/82         Prenatal Labs  Urine dip:  Lab Results   Component Value Date    KETONESU NEGATIVE 2024       Lab Results   Component Value Date    HGB 10.2 (L) 2024    HCT 29.8 (L) 2024    ABO B 2024    HEPBSAG Nonreactive 2023     No results found for: \"PAPPA\", \"AFP\", \"HCG\", \"ESTRIOL\", \"INHBA\"  No results found for: \"GLUF\", \"GLUT1\", \"MBCMYLU3JN\", \"UAJFSDJ6XB\"    Imaging  See imaging reports.     Assessment/Plan   Diagnoses and all orders for this visit:  Uterine scar from previous "  delivery affecting pregnancy  Rubella non-immune status, antepartum  Family history of spina bifida  Multigravida in second trimester  22 weeks gestation of pregnancy    Continue prenatal vitamin.  Follow up in 4 weeks for a routine prenatal visit and third trimester care.    Neuro consult 2024.     PURNIMA CAMEJO

## 2024-03-18 ENCOUNTER — HOSPITAL ENCOUNTER (OUTPATIENT)
Dept: PEDIATRIC CARDIOLOGY | Facility: HOSPITAL | Age: 24
Discharge: HOME | End: 2024-03-18
Payer: COMMERCIAL

## 2024-03-18 ENCOUNTER — OFFICE VISIT (OUTPATIENT)
Dept: PEDIATRIC CARDIOLOGY | Facility: HOSPITAL | Age: 24
End: 2024-03-18
Payer: COMMERCIAL

## 2024-03-18 VITALS
WEIGHT: 200.84 LBS | HEART RATE: 99 BPM | HEIGHT: 64 IN | BODY MASS INDEX: 34.29 KG/M2 | DIASTOLIC BLOOD PRESSURE: 77 MMHG | SYSTOLIC BLOOD PRESSURE: 115 MMHG | OXYGEN SATURATION: 98 %

## 2024-03-18 DIAGNOSIS — O35.9XX1 SUSPECTED FETAL ABNORMALITY AFFECTING MANAGEMENT OF MOTHER, FETUS 1 OF MULTIPLE GESTATION (HHS-HCC): ICD-10-CM

## 2024-03-18 DIAGNOSIS — O35.8XX0 MATERNAL CARE FOR OTHER (SUSPECTED) FETAL ABNORMALITY AND DAMAGE, NOT APPLICABLE OR UNSPECIFIED (HHS-HCC): ICD-10-CM

## 2024-03-18 DIAGNOSIS — O35.9XX0 SUSPECTED FETAL ANOMALY, ANTEPARTUM, SINGLE OR UNSPECIFIED FETUS (HHS-HCC): Primary | ICD-10-CM

## 2024-03-18 LAB — BODY SURFACE AREA: 2.03 M2

## 2024-03-18 PROCEDURE — 99214 OFFICE O/P EST MOD 30 MIN: CPT | Performed by: PEDIATRICS

## 2024-03-18 PROCEDURE — 93325 DOPPLER ECHO COLOR FLOW MAPG: CPT

## 2024-03-18 PROCEDURE — 93325 DOPPLER ECHO COLOR FLOW MAPG: CPT | Performed by: PEDIATRICS

## 2024-03-18 PROCEDURE — 1036F TOBACCO NON-USER: CPT | Performed by: PEDIATRICS

## 2024-03-18 PROCEDURE — 76827 ECHO EXAM OF FETAL HEART: CPT | Performed by: PEDIATRICS

## 2024-03-18 NOTE — LETTER
2024     NELL Waters, APRN-CNP  [address] [place] [address]  Department Of Ob/Gyn  [place] [address]    Patient: Babs Brown   YOB: 2000   Date of Visit: 3/18/2024       Dear Dr. Luisa Farah, NELL, APRN-CNP:    Thank you for referring Babs Brown to me for evaluation. Below are my notes for this consultation.  If you have questions, please do not hesitate to call me. I look forward to following your patient along with you.       Sincerely,     Germania Mejia MD      CC: MD Magda Eason, KUMAR-CNP  Maeve Cardoso, NELL, DNP  ______________________________________________________________________________________         [facility]  [facility]  Division of Pediatric Cardiology  [facility]  [address] [place] [address], 1st Floor, [place] [address]  Tel: [phone], Fax [phone]      Clinical Nurse Midwife: Luisa Farah    Babs Brown was seen at the request of Dr. Quinn James for increased nuchal translucency.  Records were reviewed, and a summary of those records is integrated within the history of present illness.  A report with my findings is being sent via written or electronic means to the referring physician with my recommendations.    History obtained from: patient    History of Presentation   History of Present Illness:   Babs Brown is a 23 y.o. female presenting for initial prenatal cardiology consultation and fetal echocardiogram for increased nuchal translucency.  She is  and approximately 23w0d weeks pregnant (Patient's last menstrual period was 10/09/2023., Estimated Date of Delivery: 7/15/24) with a Male fetus.  Her obstetric history is significant for one full term delivery via  section.  Complications of her current pregnancy include obesity.  Ms. Babs Brown had a  first trimester screen which demonstrated a nuchal translucency of 2.9mm.  Her level 2 obstetric ultrasound for anatomy was performed at 19 weeks gestational age and was normal.  A fetal echocardiogram performed at 17 weeks demonstrated  grossly normal fetal cardiac anatomy, qualitatively normal fetal cardiac function, normal fetal heart rhythm, and no evidence of in utero congestive heart failure or hydrops fetalis, but was limited secondary to gestational age.  She has undergone cell free fetal DNA testing and it was normal.  She has not had invasive genetic testing such as amniocentesis or chorionic villous sampling.  This pregnancy was not the result of in vitro fertilization.  She was not using potentially teratogenic medication at the time of conception.  There have been no other complications of this pregnancy.  Ms. Babs Brown plans to deliver her baby at [facility].    Ms. Babs Brown feels well today.  She denies any shortness of breath, abdominal cramping, contractions, bleeding, or swelling of the extremities.  She notes frequent fetal movement.      Medical History     Medical Conditions:  Patient Active Problem List   Diagnosis   • Family history of spina bifida   • H/O  section   • Obesity in pregnancy   • Rubella non-immune status, antepartum   • Maternal varicella, non-immune   • History of migraine during pregnancy   • Multigravida in second trimester   • Suspected fetal anomaly, antepartum   • Nuchal fold thickening on prenatal ultrasound   • Anemia affecting pregnancy in second trimester     Past Surgeries:  Past Surgical History:   Procedure Laterality Date   •  SECTION, LOW TRANSVERSE     • OTHER SURGICAL HISTORY  2022    Foot surgery   • OTHER SURGICAL HISTORY  2022    Dilation and curettage     Current Outpatient Medications   Medication Instructions   • acetaminophen (TYLENOL EXTRA STRENGTH) 1,000 mg, oral, Every 6 hours PRN   •  "diphenhydrAMINE (BENADRYL) 25 mg, oral, Every 6 hours PRN   • ferrous sulfate, 325 mg ferrous sulfate, (iron) tablet 1 tablet, oral, 2 times daily   • magnesium 200 mg, oral, 2 times daily   • metoclopramide (REGLAN) 10 mg, oral, Every 6 hours PRN   • prenatal vitamin, iron-folic, (prenatal vit no.130-iron-folic) 27 mg iron-800 mcg folic acid tablet 1 tablet, oral, Daily   • Vitamin B-6 25 mg, oral, Every 8 hours      Allergies:  Lactose and Topamax [topiramate]    Social History:  Patient lives with significant other and son.  Occupation: Stay at home mom  Smoking: None  Alcohol: None  Drug Use: None  She wears a seatbelt while in the car. She denies any verbal, sexual or physical abuse.     Cardiac Family History (for patient and father of baby):  Cousin's child born with congenital heart disease, otherwise, there is no history of early sudden/unexplained death including SIDS and drowning.  There is no history of cardiomyopathy of any type or heart transplant.  There is no history of arrhythmias/pacemaker/defibrillator or arrhythmia syndromes, including Long QT syndrome, Tobin-Parkinson-White syndrome or Brugada syndrome.  There is no history of heart attack or stroke before the age of 55 years in a close family member.  There is no history of Marfan syndrome or aortic aneurysm.  There is no history of deafness.  There is no history of syncope/fainting.  There is no history of high blood pressure or high cholesterol.  There is no history of DiGeorge Syndrome (22q11).    Physical Examination     /77 (BP Location: Right arm, Patient Position: Sitting)   Pulse 99   Ht 1.624 m (5' 3.94\")   Wt 91.1 kg (200 lb 13.4 oz)   LMP 10/09/2023   SpO2 98%   BMI 34.54 kg/m²     General: Alert, well-appearing and in no acute distress.    Abdomen: Soft, nontender, not distended. Gravid.  Extremities: No swelling or edema.  Neurologic / Psychiatric: Grossly intact without focal deficits.  Appropriate " demeanor.      Results     Image quality: Good  Cardiac situs: Cardiac mass in the left chest.  Left ventricular apex points leftward.  Segmental anatomy: Atrio-ventricular concordance.  Ventriculo-arterial concordance.  Normally-related great arteries.  Superior vena cava: Normal connection to the right atrium.  Inferior vena cava: Normal connection to the right atrium.  Pulmonary veins: At least one pulmonary vein connects normally to the left atrium.  Atrial septum: Patent foramen ovale, with flap valve bowing into the left atrium.  Tricuspid valve: Structurally normal.  No obvious stenosis or insufficiency.  Mitral valve: Structurally normal.  No obvious stenosis or insufficiency.  Right ventricle: Normal ventricular size, wall thickness, and systolic function (qualitative).  Left ventricle: Normal ventricular size, wall thickness, and systolic function (qualitative).  Interventricular septum: No obvious septal defect.  Aortic valve: Structurally normal.  No obvious stenosis or insufficiency.  Pulmonary valve: Structurally normal.  No obvious stenosis or insufficiency.  Pulmonary artery: Normal in size.  Ductal arch: Patent with right to left shunting.  Aortic arch: Left-sided.  Patent.  Pericardial effusion: None.  Umbilical arteries: Two umbilical arteries.  Normal arterial flow pattern.  Umbilical vein: Normal venous flow pattern.  Electrophysiology: Normal fetal heart rate and rhythm.  Normal mechanical LA interval.    Assessment & Plan   Assessment:  Ms. Babs Brown is a 23 y.o. female who is  and currently at 23 0/7 weeks gestational age with a male fetus.  A fetal echocardiogram was performed today for increased NT (early fetal echocardiogram at 17 weeks gestational age normal.     Fetal echocardiogram today demonstrated grossly normal fetal cardiac anatomy, qualitatively normal fetal cardiac function, normal fetal heart rhythm, and no evidence of in utero congestive heart failure or hydrops  fetalis.  I reviewed the results of today's evaluation, including the findings of the fetal echocardiogram, with Ms. Babs Brown in detail.      I discussed limitations of the technology of fetal echocardiography with Ms. Babs Brown in detail.  I explained that fetal echocardiography cannot exclude all forms of congenital heart disease.  Fetal echocardiography may be insensitive to some defects of atrial and ventricular septation, minor valvular abnormalities, partial anomalous pulmonary venous return, and coarctation of the aorta.  In addition, normal fetal echocardiogram does not ensure that the fetal ductus arteriosus or foramen ovale will close.      Recommendations:  No changes to prenatal care.    Further fetal cardiac imaging is not required at this time.  We would be happy to see Ms. Babs Brown in the future if new concerns arise.    Triage code 0:        No changes to delivery planning.  Delivery per obstetrics at patient´s preferred hospital.  Standard  care per  team.        No pediatric cardiology consult needed after birth unless there are concerns/issues.    I spent greater than 60 minutes in performance of this consultation, of which greater than 50% was related to coordination of care or counseling.    This assessment and plan, in addition to the results of relevant testing were explained to Babs. All questions were answered and understanding was demonstrated.    It was a pleasure to see Ms. Babs Brown today.  If you have any questions or concerns regarding this evaluation, do not hesitate to contact me.      Germania Mejia MD, FACC, FAAP   of Pediatrics  Division of Pediatric Cardiology  [place] [address]  Phone: [phone]  Fax: [phone]  e-mail: [email or web address]

## 2024-03-18 NOTE — PROGRESS NOTES
[facility]  [facility]  Division of Pediatric Cardiology  [facility]  [address] [address] [facility], 1st Floor, [place] [address]  Tel: [phone], Fax [phone]      Clinical Nurse Midwife: Luisa Farah    Babs Brown was seen at the request of Dr. Quinn James for increased nuchal translucency.  Records were reviewed, and a summary of those records is integrated within the history of present illness.  A report with my findings is being sent via written or electronic means to the referring physician with my recommendations.    History obtained from: patient    History of Presentation   History of Present Illness:   Babs Brown is a 23 y.o. female presenting for initial prenatal cardiology consultation and fetal echocardiogram for increased nuchal translucency.  She is  and approximately 23w0d weeks pregnant (Patient's last menstrual period was 10/09/2023., Estimated Date of Delivery: 7/15/24) with a Male fetus.  Her obstetric history is significant for one full term delivery via  section.  Complications of her current pregnancy include obesity.  Ms. Babs Brown had a first trimester screen which demonstrated a nuchal translucency of 2.9mm.  Her level 2 obstetric ultrasound for anatomy was performed at 19 weeks gestational age and was normal.  A fetal echocardiogram performed at 17 weeks demonstrated  grossly normal fetal cardiac anatomy, qualitatively normal fetal cardiac function, normal fetal heart rhythm, and no evidence of in utero congestive heart failure or hydrops fetalis, but was limited secondary to gestational age.  She has undergone cell free fetal DNA testing and it was normal.  She has not had invasive genetic testing such as amniocentesis or chorionic villous sampling.  This pregnancy was not the result of in vitro fertilization.  She was not  using potentially teratogenic medication at the time of conception.  There have been no other complications of this pregnancy.  Ms. Babs Brown plans to deliver her baby at [facility].    Ms. Babs Brown feels well today.  She denies any shortness of breath, abdominal cramping, contractions, bleeding, or swelling of the extremities.  She notes frequent fetal movement.      Medical History     Medical Conditions:  Patient Active Problem List   Diagnosis    Family history of spina bifida    H/O  section    Obesity in pregnancy    Rubella non-immune status, antepartum    Maternal varicella, non-immune    History of migraine during pregnancy    Multigravida in second trimester    Suspected fetal anomaly, antepartum    Nuchal fold thickening on prenatal ultrasound    Anemia affecting pregnancy in second trimester     Past Surgeries:  Past Surgical History:   Procedure Laterality Date     SECTION, LOW TRANSVERSE      OTHER SURGICAL HISTORY  2022    Foot surgery    OTHER SURGICAL HISTORY  2022    Dilation and curettage     Current Outpatient Medications   Medication Instructions    acetaminophen (TYLENOL EXTRA STRENGTH) 1,000 mg, oral, Every 6 hours PRN    diphenhydrAMINE (BENADRYL) 25 mg, oral, Every 6 hours PRN    ferrous sulfate, 325 mg ferrous sulfate, (iron) tablet 1 tablet, oral, 2 times daily    magnesium 200 mg, oral, 2 times daily    metoclopramide (REGLAN) 10 mg, oral, Every 6 hours PRN    prenatal vitamin, iron-folic, (prenatal vit no.130-iron-folic) 27 mg iron-800 mcg folic acid tablet 1 tablet, oral, Daily    Vitamin B-6 25 mg, oral, Every 8 hours      Allergies:  Lactose and Topamax [topiramate]    Social History:  Patient lives with significant other and son.  Occupation: Stay at home mom  Smoking: None  Alcohol: None  Drug Use: None  She wears a seatbelt while in the car. She denies any verbal, sexual or physical abuse.     Cardiac Family History (for patient  "and father of baby):  Cousin's child born with congenital heart disease, otherwise, there is no history of early sudden/unexplained death including SIDS and drowning.  There is no history of cardiomyopathy of any type or heart transplant.  There is no history of arrhythmias/pacemaker/defibrillator or arrhythmia syndromes, including Long QT syndrome, Tobin-Parkinson-White syndrome or Brugada syndrome.  There is no history of heart attack or stroke before the age of 55 years in a close family member.  There is no history of Marfan syndrome or aortic aneurysm.  There is no history of deafness.  There is no history of syncope/fainting.  There is no history of high blood pressure or high cholesterol.  There is no history of DiGeorge Syndrome (22q11).    Physical Examination     /77 (BP Location: Right arm, Patient Position: Sitting)   Pulse 99   Ht 1.624 m (5' 3.94\")   Wt 91.1 kg (200 lb 13.4 oz)   LMP 10/09/2023   SpO2 98%   BMI 34.54 kg/m²     General: Alert, well-appearing and in no acute distress.    Abdomen: Soft, nontender, not distended. Gravid.  Extremities: No swelling or edema.  Neurologic / Psychiatric: Grossly intact without focal deficits.  Appropriate demeanor.      Results     Image quality: Good  Cardiac situs: Cardiac mass in the left chest.  Left ventricular apex points leftward.  Segmental anatomy: Atrio-ventricular concordance.  Ventriculo-arterial concordance.  Normally-related great arteries.  Superior vena cava: Normal connection to the right atrium.  Inferior vena cava: Normal connection to the right atrium.  Pulmonary veins: At least one pulmonary vein connects normally to the left atrium.  Atrial septum: Patent foramen ovale, with flap valve bowing into the left atrium.  Tricuspid valve: Structurally normal.  No obvious stenosis or insufficiency.  Mitral valve: Structurally normal.  No obvious stenosis or insufficiency.  Right ventricle: Normal ventricular size, wall thickness, and " systolic function (qualitative).  Left ventricle: Normal ventricular size, wall thickness, and systolic function (qualitative).  Interventricular septum: No obvious septal defect.  Aortic valve: Structurally normal.  No obvious stenosis or insufficiency.  Pulmonary valve: Structurally normal.  No obvious stenosis or insufficiency.  Pulmonary artery: Normal in size.  Ductal arch: Patent with right to left shunting.  Aortic arch: Left-sided.  Patent.  Pericardial effusion: None.  Umbilical arteries: Two umbilical arteries.  Normal arterial flow pattern.  Umbilical vein: Normal venous flow pattern.  Electrophysiology: Normal fetal heart rate and rhythm.  Normal mechanical [place] interval.    Assessment & Plan   Assessment:  Ms. Babs Brown is a 23 y.o. female who is  and currently at 23 0/7 weeks gestational age with a male fetus.  A fetal echocardiogram was performed today for increased NT (early fetal echocardiogram at 17 weeks gestational age normal.     Fetal echocardiogram today demonstrated grossly normal fetal cardiac anatomy, qualitatively normal fetal cardiac function, normal fetal heart rhythm, and no evidence of in utero congestive heart failure or hydrops fetalis.  I reviewed the results of today's evaluation, including the findings of the fetal echocardiogram, with Ms. Babs Brown in detail.      I discussed limitations of the technology of fetal echocardiography with Ms. Babs Brown in detail.  I explained that fetal echocardiography cannot exclude all forms of congenital heart disease.  Fetal echocardiography may be insensitive to some defects of atrial and ventricular septation, minor valvular abnormalities, partial anomalous pulmonary venous return, and coarctation of the aorta.  In addition, normal fetal echocardiogram does not ensure that the fetal ductus arteriosus or foramen ovale will close.      Recommendations:  No changes to prenatal care.    Further fetal cardiac imaging is not  required at this time.  We would be happy to see Ms. Babs Brown in the future if new concerns arise.    Triage code 0:        No changes to delivery planning.  Delivery per obstetrics at patient´s preferred hospital.  Standard  care per  team.        No pediatric cardiology consult needed after birth unless there are concerns/issues.    I spent greater than 60 minutes in performance of this consultation, of which greater than 50% was related to coordination of care or counseling.    This assessment and plan, in addition to the results of relevant testing were explained to Babs. All questions were answered and understanding was demonstrated.    It was a pleasure to see Ms. Babs Brown today.  If you have any questions or concerns regarding this evaluation, do not hesitate to contact me.      Germania Mejia MD, FACC, FAAP   of Pediatrics  Division of Pediatric Cardiology  [place] [address]  Phone: [phone]  Fax: [phone]  e-mail: [email or web address]

## 2024-03-18 NOTE — PATIENT INSTRUCTIONS
On fetal echocardiogram today the structure, size, function (squeeze), and rhythm of your fetus' heart were normal.  There are some limitations to fetal echocardiogram as described below, and because it is not a perfect test it is important to know that we cannot rule out all possible heart problems (see below).  We will communicate these results with your obstetrician.    It was a pleasure to see you today.  We do not need to see you back for routine follow-up during the remainder of your pregnancy.  However, we would be happy to see you back if new issues or concerns arise.  After birth your baby does not need to see a cardiologist unless the pediatric team has concerns.  If you have any questions or concerns regarding this evaluation, please do not hesitate to contact me.    Germania Mejia MD   of Pediatrics  Division of Pediatric Cardiology  [place] [address]  Phone: [phone]  Fax: [phone]  e-mail: [email or web address]    [email or web address]    Normal Fetal Echocardiogram    Today you had an ultrasound of your fetus' heart (fetal echocardiogram).  Based on all of the pictures taken today, the heart appears normal and is developing as expected for this point in your pregnancy.   Therefore, no further testing is recommended at this time.    Despite today´s normal findings, it is impossible to rule out all heart problems before birth.  This is partially because the fetus´ heart is so small, and our machine´s technology can only see structures down to a certain size.  It is also because blood flow in a fetus is different and more complicated than blood flow after birth.    Briefly:  ° Fetuses get oxygen from the placenta instead of their lungs.  High-oxygen (red) blood from the placenta comes back to the right side of the fetus´  heart.  ° Red blood crosses to the left side of the heart through a hole between the upper chambers of the heart, the foramen ovale.  The foramen ovale lets the red blood flow to the body.  ° Low-oxygen (blue) blood stays on the right side of the heart.  After leaving the heart, the blue blood flows through a special blood vessel known as the ductus arteriosus.  The ductus arteriosus allows the blue blood to bypass the lungs and return to the placenta to get oxygen.  ° After birth the foramen ovale and ductus arteriosus should close, but sometimes they do not.  It is impossible to predict in which children these structures will remain open.    ° Thus, on fetal echo we cannot rule out that your baby will have a patent foramen ovale (PFO) or patent ductus arteriosus (PDA) after birth.    In addition, fetal echocardiography can miss other heart defects including:  ° Small or medium-sized holes between the upper or lower heart chambers.  ° Minor abnormalities of the heart valves.  ° Narrowing of the main artery that goes to the body (coarctation of the aorta).  ° Other rare abnormalities of the veins or arteries.    If any of these defects are present, there should be findings after birth that will alert your child´s doctor that there is a problem and prompt further evaluation.  After delivery, if your pediatrician has any concerns, your child's heart can be reevaluated at that time.

## 2024-03-26 ENCOUNTER — TELEPHONE (OUTPATIENT)
Dept: OBSTETRICS AND GYNECOLOGY | Facility: CLINIC | Age: 24
End: 2024-03-26
Payer: COMMERCIAL

## 2024-03-26 NOTE — TELEPHONE ENCOUNTER
Patient is requesting to talk to [facility] about getting approval to get her wisdom teeth removed. Please advise. Thank you

## 2024-03-26 NOTE — TELEPHONE ENCOUNTER
"Pt called OBGYN office again asking for pain management recommendations. Pt advised per CG that she can get her wisdom tooth removed with local lidocaine but not with general anesthesia. Pt states she called the dentist and they cannot remove the wisdom tooth in pregnancy as the medications they use are not studied/safe in pregnancy. Pt states her wisdom tooth is causing pain 9.5/10 and she also has a headache 8/10 pain. Pt states she's tried \"migraine cocktail\", warm water with salt, oragel, and heating press. Pt is wondering what she can use for pain management. Message sent to ANAHY Westbrook CNM on call at [facility] for recommendations.     Per ANLAIA.KARELY, Dr. Mcgee at [facility] will do the removal as they take walk ins and emergencies usually. Pt notified via phone call. Pt agreeable with plan of care with no further questions.     "

## 2024-03-28 ENCOUNTER — TELEPHONE (OUTPATIENT)
Dept: OBSTETRICS AND GYNECOLOGY | Facility: CLINIC | Age: 24
End: 2024-03-28
Payer: COMMERCIAL

## 2024-03-28 NOTE — TELEPHONE ENCOUNTER
PATIENT SPOKE WITH DENTIST AND THEY ARE GOING TO SEND SOME PAPER WORK ABOUT MEDICATION THAT SHE CAN TAKE WHILE PREGNANT. SHE IS HAVING A LOT OF PAIN IN HER TEETH

## 2024-04-01 ENCOUNTER — ROUTINE PRENATAL (OUTPATIENT)
Dept: OBSTETRICS AND GYNECOLOGY | Facility: CLINIC | Age: 24
End: 2024-04-01
Payer: COMMERCIAL

## 2024-04-01 VITALS — SYSTOLIC BLOOD PRESSURE: 110 MMHG | WEIGHT: 205 LBS | BODY MASS INDEX: 35.26 KG/M2 | DIASTOLIC BLOOD PRESSURE: 78 MMHG

## 2024-04-01 DIAGNOSIS — O35.9XX1 SUSPECTED FETAL ANOMALY, ANTEPARTUM, FETUS 1 OF MULTIPLE GESTATION (HHS-HCC): ICD-10-CM

## 2024-04-01 DIAGNOSIS — Z28.39 MATERNAL VARICELLA, NON-IMMUNE (HHS-HCC): ICD-10-CM

## 2024-04-01 DIAGNOSIS — O28.3 NUCHAL FOLD THICKENING ON PRENATAL ULTRASOUND: ICD-10-CM

## 2024-04-01 DIAGNOSIS — Z87.59 HISTORY OF MIGRAINE DURING PREGNANCY: ICD-10-CM

## 2024-04-01 DIAGNOSIS — Z34.82 MULTIGRAVIDA IN SECOND TRIMESTER (HHS-HCC): ICD-10-CM

## 2024-04-01 DIAGNOSIS — Z82.79 FAMILY HISTORY OF SPINA BIFIDA: Primary | ICD-10-CM

## 2024-04-01 DIAGNOSIS — O99.210 OBESITY IN PREGNANCY (HHS-HCC): ICD-10-CM

## 2024-04-01 DIAGNOSIS — Z98.891 H/O CESAREAN SECTION: ICD-10-CM

## 2024-04-01 DIAGNOSIS — O09.899 MATERNAL VARICELLA, NON-IMMUNE (HHS-HCC): ICD-10-CM

## 2024-04-01 DIAGNOSIS — Z28.39 RUBELLA NON-IMMUNE STATUS, ANTEPARTUM (HHS-HCC): ICD-10-CM

## 2024-04-01 DIAGNOSIS — O99.012 ANEMIA AFFECTING PREGNANCY IN SECOND TRIMESTER (HHS-HCC): ICD-10-CM

## 2024-04-01 DIAGNOSIS — O09.899 RUBELLA NON-IMMUNE STATUS, ANTEPARTUM (HHS-HCC): ICD-10-CM

## 2024-04-01 DIAGNOSIS — Z3A.25 25 WEEKS GESTATION OF PREGNANCY (HHS-HCC): ICD-10-CM

## 2024-04-01 DIAGNOSIS — Z86.69 HISTORY OF MIGRAINE DURING PREGNANCY: ICD-10-CM

## 2024-04-01 PROCEDURE — 99213 OFFICE O/P EST LOW 20 MIN: CPT | Performed by: NURSE PRACTITIONER

## 2024-04-01 NOTE — PROGRESS NOTES
"Subjective   Patient ID [id number]   Babs Brown is a 24 y.o.  at 25w0d with a working estimated date of delivery of 7/15/2024, by Last Menstrual Period who presents for a routine prenatal visit. She denies vaginal bleeding, leakage of fluid, decreased fetal movements, or contractions.     Pt is in need of a root canal and wisdom teeth removal, scheduled for 2024. Taking tylenol and oragel with some relief. Started on antibiotics.       Her pregnancy is complicated by:  Hx LTCS  --TOLAC r/f MFMU   --anterior placenta   Family history of spinal bifida   -uncle, surgical repair   Obesity in pregnancy   -BMI 34 at first visit   -following with growth scans  Migraines in pregnancy-   -->Taking Tylenol/Reglan and Benadryl   -->Referred to neurology   Impacted wisdom teeth  --> following with dentist at this time, possible removal during the pregnancy   Thickened NT  -> peds cardiology cleared pt   ->Following with MFM for growth     Objective   Physical Exam  Weight: 93 kg (205 lb)  Expected Total Weight Gain: Could not be calculated   Pregravid BMI: Could not be calculated  BP: 110/78  Fetal Heart Rate: 142 Fundal Height (cm): 25 cm    Prenatal Labs  Urine dip:  Lab Results   Component Value Date    KETONESU NEGATIVE 2024       Lab Results   Component Value Date    HGB 10.2 (L) 2024    HCT 29.8 (L) 2024    ABO B 2024    HEPBSAG Nonreactive 2023     No results found for: \"PAPPA\", \"AFP\", \"HCG\", \"ESTRIOL\", \"INHBA\"  No results found for: \"GLUF\", \"GLUT1\", \"SOMMDZS5OL\", \"PMXRTVD9CH\"    Imaging  The most recent ultrasound was performed on The most recent ultrasound study is not finalized with a study GA of The most recent ultrasound study is not finalized and EFW of The most recent ultrasound study is not finalized.  The most recent ultrasound study is not finalized  The most recent ultrasound study is not finalized    Assessment/Plan   Diagnoses and all orders for this visit:  Family " history of spina bifida  H/O  section  Obesity in pregnancy  Rubella non-immune status, antepartum  Maternal varicella, non-immune  History of migraine during pregnancy  Multigravida in second trimester  Suspected fetal anomaly, antepartum, fetus 1 of multiple gestation  Nuchal fold thickening on prenatal ultrasound  Comments:  Following with MFM, rrcfDNA negative  Anemia affecting pregnancy in second trimester  25 weeks gestation of pregnancy      Continue prenatal vitamin.  Labs reviewed.  Rhogam n/a Rh positive.   GTT next visit with CBC and retic count  Follow up in 2- 3weeks for a routine prenatal visit.    Plan for wisdom tooth extraction.

## 2024-04-01 NOTE — LETTER
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     If you have any questions or concerns, please don't hesitate to call.         Sincerely,     Aslhy Farah, KUMAR-CNM, KUMAR-CNP        CC: No Recipients

## 2024-04-06 DIAGNOSIS — Z3A.25 25 WEEKS GESTATION OF PREGNANCY (HHS-HCC): Primary | ICD-10-CM

## 2024-04-06 DIAGNOSIS — R11.2 NAUSEA AND VOMITING, UNSPECIFIED VOMITING TYPE: ICD-10-CM

## 2024-04-06 RX ORDER — ONDANSETRON 4 MG/1
4 TABLET, ORALLY DISINTEGRATING ORAL EVERY 8 HOURS PRN
Qty: 20 TABLET | Refills: 0 | Status: SHIPPED | OUTPATIENT
Start: 2024-04-06 | End: 2024-04-13

## 2024-04-06 NOTE — PROGRESS NOTES
Telephone:     Patient called the answering service with nausea and vomiting starting this AM patient feels some general malaise denies fevers, chills or dizziness. Feeling mild fatigue is unable to keep anything down at this time. Feels she may have ate something at some turkey this am. Options reviewed to come in for fluids and antiemetics or can send to pharmacy and try PO fluids at home if tolerating.     No VB, LOF, or contractions, feels normal fetal movement    Patient is to call if symptoms do not resolve with zofran   Or come to L&D for any dizziness or extreme fatigue    Gaye HEATH CNM

## 2024-04-08 ENCOUNTER — TELEPHONE (OUTPATIENT)
Dept: OBSTETRICS AND GYNECOLOGY | Facility: CLINIC | Age: 24
End: 2024-04-08
Payer: COMMERCIAL

## 2024-04-08 NOTE — TELEPHONE ENCOUNTER
Pt called and is requesting a letter from [facility] regarding her symptoms, additional testing she has need for monitoring of the baby. 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 She stated she was having migraines, dental issues, monitoring baby for fluid on the neck, and nausea.

## 2024-04-09 NOTE — TELEPHONE ENCOUNTER
PATIENT CALLED BACK. I TOLD HER SHE CAN GET A LETTER WITH SPECIFICS. PLEASE SEND THROUGH MY CHART. THANK YOU

## 2024-04-22 ENCOUNTER — ROUTINE PRENATAL (OUTPATIENT)
Dept: OBSTETRICS AND GYNECOLOGY | Facility: CLINIC | Age: 24
End: 2024-04-22
Payer: COMMERCIAL

## 2024-04-22 VITALS — SYSTOLIC BLOOD PRESSURE: 128 MMHG | BODY MASS INDEX: 35.43 KG/M2 | WEIGHT: 206 LBS | DIASTOLIC BLOOD PRESSURE: 88 MMHG

## 2024-04-22 DIAGNOSIS — Z98.891 H/O CESAREAN SECTION: Primary | ICD-10-CM

## 2024-04-22 DIAGNOSIS — O09.899 RUBELLA NON-IMMUNE STATUS, ANTEPARTUM (HHS-HCC): ICD-10-CM

## 2024-04-22 DIAGNOSIS — Z28.39 MATERNAL VARICELLA, NON-IMMUNE (HHS-HCC): ICD-10-CM

## 2024-04-22 DIAGNOSIS — Z28.39 RUBELLA NON-IMMUNE STATUS, ANTEPARTUM (HHS-HCC): ICD-10-CM

## 2024-04-22 DIAGNOSIS — Z34.82 MULTIGRAVIDA IN SECOND TRIMESTER (HHS-HCC): ICD-10-CM

## 2024-04-22 DIAGNOSIS — Z3A.28 28 WEEKS GESTATION OF PREGNANCY (HHS-HCC): ICD-10-CM

## 2024-04-22 DIAGNOSIS — O99.012 ANEMIA AFFECTING PREGNANCY IN SECOND TRIMESTER (HHS-HCC): ICD-10-CM

## 2024-04-22 DIAGNOSIS — O28.3 NUCHAL FOLD THICKENING ON PRENATAL ULTRASOUND: ICD-10-CM

## 2024-04-22 DIAGNOSIS — O09.899 MATERNAL VARICELLA, NON-IMMUNE (HHS-HCC): ICD-10-CM

## 2024-04-22 DIAGNOSIS — O99.210 OBESITY IN PREGNANCY (HHS-HCC): ICD-10-CM

## 2024-04-22 DIAGNOSIS — Z87.59 HISTORY OF MIGRAINE DURING PREGNANCY: ICD-10-CM

## 2024-04-22 DIAGNOSIS — Z86.69 HISTORY OF MIGRAINE DURING PREGNANCY: ICD-10-CM

## 2024-04-22 LAB
ERYTHROCYTE [DISTWIDTH] IN BLOOD BY AUTOMATED COUNT: 12.8 % (ref 11.5–14.5)
GLUCOSE 1H P 50 G GLC PO SERPL-MCNC: 98 MG/DL
HCT VFR BLD AUTO: 29.8 % (ref 36–46)
HGB BLD-MCNC: 9.7 G/DL (ref 12–16)
MCH RBC QN AUTO: 28.8 PG (ref 26–34)
MCHC RBC AUTO-ENTMCNC: 32.6 G/DL (ref 32–36)
MCV RBC AUTO: 88 FL (ref 80–100)
NRBC BLD-RTO: 0 /100 WBCS (ref 0–0)
PLATELET # BLD AUTO: 296 X10*3/UL (ref 150–450)
RBC # BLD AUTO: 3.37 X10*6/UL (ref 4–5.2)
WBC # BLD AUTO: 11.4 X10*3/UL (ref 4.4–11.3)

## 2024-04-22 PROCEDURE — 82746 ASSAY OF FOLIC ACID SERUM: CPT

## 2024-04-22 PROCEDURE — 82607 VITAMIN B-12: CPT

## 2024-04-22 PROCEDURE — 82947 ASSAY GLUCOSE BLOOD QUANT: CPT

## 2024-04-22 PROCEDURE — 82728 ASSAY OF FERRITIN: CPT

## 2024-04-22 PROCEDURE — 99213 OFFICE O/P EST LOW 20 MIN: CPT | Performed by: NURSE PRACTITIONER

## 2024-04-22 PROCEDURE — 85027 COMPLETE CBC AUTOMATED: CPT

## 2024-04-22 PROCEDURE — 83550 IRON BINDING TEST: CPT

## 2024-04-22 PROCEDURE — 36415 COLL VENOUS BLD VENIPUNCTURE: CPT

## 2024-04-22 NOTE — PROGRESS NOTES
Subjective   Patient ID [id number]   Babs Brown is a 24 y.o.  at 25w0d with a working estimated date of delivery of 7/15/2024, by Last Menstrual Period who presents for a routine prenatal visit. She denies vaginal bleeding, leakage of fluid, decreased fetal movements, or contractions.     Pt is in need of a root canal and wisdom teeth removal, scheduled for 2024. Taking tylenol and oragel with some relief. Started on antibiotics Partial root canal done. Occasional nausea with ongoing antibiotics   1 hour GCT and CBC today     Her pregnancy is complicated by:  Hx LTCS (for fetal distress)  --TOLAC r/f MFMU completed on 24, tool reviewed  Estimated chance for success: 56.8%  --anterior placenta   Family history of spinal bifida   -uncle, surgical repair   Obesity in pregnancy   -BMI 34 at first visit   -following with growth scans  Migraines in pregnancy-   -->Taking Tylenol/Reglan and Benadryl   -->Referred to neurology   Impacted wisdom teeth  --> following with dentist at this time, possible removal during the pregnancy   Thickened NT  -> peds cardiology cleared pt   ->Following with MFM for growth     Objective   Physical Exam  Weight: 93.4 kg (206 lb)  Expected Total Weight Gain: Could not be calculated   Pregravid BMI: Could not be calculated  BP: 128/88  Fetal Heart Rate: 149 Fundal Height (cm): 29 cm    Prenatal Labs  Urine dip:  Lab Results   Component Value Date    KETONESU NEGATIVE 2024       Lab Results   Component Value Date    HGB 10.2 (L) 2024    HCT 29.8 (L) 2024    ABO B 2024    HEPBSAG Nonreactive 2023         Assessment/Plan   Diagnoses and all orders for this visit:  H/O  section  28 weeks gestation of pregnancy ([facility])  -     Glucose, 1 Hour Screen, Pregnancy  -     CBC Anemia Panel With Reflex,Pregnancy  -     Reticulocytes; Future  Maternal varicella, non-immune ([facility])  Rubella non-immune status, antepartum ([facility])  Obesity in  pregnancy ([facility])  Nuchal fold thickening on prenatal ultrasound  Multigravida in second trimester ([facility])  History of migraine during pregnancy  Anemia affecting pregnancy in second trimester ([facility])  -     Reticulocytes; Future      Continue prenatal vitamin.  Labs reviewed.  Rhogam n/a Rh positive.   GCT today with CBC and retic count  Follow up in 2 weeks for a routine prenatal visit.    Plan for wisdom tooth extraction.     Luisa Farah, APRN-CNM, APRN-CNP

## 2024-04-23 LAB
FERRITIN SERPL-MCNC: 11 NG/ML
FOLATE SERPL-MCNC: 5.3 NG/ML
IRON SATN MFR SERPL: NORMAL %
IRON SERPL-MCNC: 44 UG/DL
REFLEX ADDED, ANEMIA PANEL: NORMAL
TIBC SERPL-MCNC: NORMAL UG/DL
UIBC SERPL-MCNC: >450 UG/DL
VIT B12 SERPL-MCNC: 233 PG/ML

## 2024-04-25 ENCOUNTER — OFFICE VISIT (OUTPATIENT)
Dept: NEUROLOGY | Facility: CLINIC | Age: 24
End: 2024-04-25
Payer: COMMERCIAL

## 2024-04-25 VITALS
SYSTOLIC BLOOD PRESSURE: 118 MMHG | DIASTOLIC BLOOD PRESSURE: 78 MMHG | HEIGHT: 64 IN | WEIGHT: 207.2 LBS | HEART RATE: 85 BPM | BODY MASS INDEX: 35.37 KG/M2

## 2024-04-25 DIAGNOSIS — Z3A.28 28 WEEKS GESTATION OF PREGNANCY (HHS-HCC): ICD-10-CM

## 2024-04-25 DIAGNOSIS — Z86.69 HISTORY OF MIGRAINE DURING PREGNANCY: ICD-10-CM

## 2024-04-25 DIAGNOSIS — G43.909 MIGRAINE WITHOUT STATUS MIGRAINOSUS, NOT INTRACTABLE, UNSPECIFIED MIGRAINE TYPE: Primary | ICD-10-CM

## 2024-04-25 DIAGNOSIS — Z87.59 HISTORY OF MIGRAINE DURING PREGNANCY: ICD-10-CM

## 2024-04-25 DIAGNOSIS — M54.81 BILATERAL OCCIPITAL NEURALGIA: ICD-10-CM

## 2024-04-25 PROCEDURE — 1036F TOBACCO NON-USER: CPT | Performed by: PSYCHIATRY & NEUROLOGY

## 2024-04-25 PROCEDURE — 99204 OFFICE O/P NEW MOD 45 MIN: CPT | Performed by: PSYCHIATRY & NEUROLOGY

## 2024-04-25 RX ORDER — SUMATRIPTAN 50 MG/1
100 TABLET, FILM COATED ORAL ONCE AS NEEDED
Qty: 9 TABLET | Refills: 5 | Status: SHIPPED | OUTPATIENT
Start: 2024-04-25 | End: 2024-05-23 | Stop reason: ALTCHOICE

## 2024-04-25 ASSESSMENT — ENCOUNTER SYMPTOMS
SPEECH DIFFICULTY: 0
COUGH: 0
HEADACHES: 0
EYE DISCHARGE: 0
DYSPHORIC MOOD: 0
JOINT SWELLING: 0
RHINORRHEA: 0
BLOOD IN STOOL: 0
LIGHT-HEADEDNESS: 0
ABDOMINAL PAIN: 0
MYALGIAS: 0
DIAPHORESIS: 0
ARTHRALGIAS: 0
FATIGUE: 0
ACTIVITY CHANGE: 0
ABDOMINAL DISTENTION: 0
NUMBNESS: 0
PHOTOPHOBIA: 0
CHEST TIGHTNESS: 0
EYE ITCHING: 0
SLEEP DISTURBANCE: 0
DIFFICULTY URINATING: 0
VOMITING: 0
FEVER: 0
DIARRHEA: 0
PALPITATIONS: 0
CONSTIPATION: 0
POLYDIPSIA: 0
EYE PAIN: 0
SORE THROAT: 0
APNEA: 0
POLYPHAGIA: 0
APPETITE CHANGE: 0
UNEXPECTED WEIGHT CHANGE: 0
HEMATURIA: 0
DYSURIA: 0
NAUSEA: 0
FLANK PAIN: 0
BRUISES/BLEEDS EASILY: 0
TREMORS: 0
STRIDOR: 0
CHOKING: 0
WHEEZING: 0
TROUBLE SWALLOWING: 0
CHILLS: 0
FREQUENCY: 0
DIZZINESS: 0
VOICE CHANGE: 0
SHORTNESS OF BREATH: 0
WEAKNESS: 0

## 2024-04-25 ASSESSMENT — VISUAL ACUITY: VA_NORMAL: 1

## 2024-04-25 NOTE — PROGRESS NOTES
History Of Present Illness  Babs Brown is a 24 y.o. female presenting with migraines.     Currently pregnancy 28weeks and 4 days, .   Hx of migraines since 14yo. Was on monthly injections (unsure of what the drug was) for ppx and pill for  of headaches. 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 Previous neurologist with [facility].  Has headaches everyday, lasts hrs. Described as throbbing, usually on the R side, no vision changes, n/v. Does have photophobia. Sometimes wakes up with Headaches. Does have neck pain/tenderness and suboccitpal headaches but has never had trauma to the neck.   Takes tylenol (2 extra strength) which does help. Went to ED for sxs and gave cocktail which did not help. Neck massage does help.       Past Medical and Surgical History    Past Medical History:   Diagnosis Date    ADHD     Anxiety     Depression     Encounter for  screening for malformations ([facility]) 2022    Encounter for  screening for malformations    Migraine           Past Surgical History:   Procedure Laterality Date     SECTION, LOW TRANSVERSE      OTHER SURGICAL HISTORY  2022    Foot surgery    OTHER SURGICAL HISTORY  2022    Dilation and curettage        Social History  Social History     Tobacco Use    Smoking status: Never    Smokeless tobacco: Never   Vaping Use    Vaping status: Never Used   Substance Use Topics    Alcohol use: Not Currently    Drug use: Never     Allergies  Lactose and Topamax [topiramate]  (Not in a hospital admission)      Review of Systems   Constitutional:  Negative for activity change, appetite change, chills, diaphoresis, fatigue, fever and unexpected weight change.   HENT:  Negative for congestion, ear discharge, ear pain, hearing loss, mouth sores, nosebleeds, postnasal drip, rhinorrhea, sore throat, tinnitus, trouble swallowing and voice change.    Eyes:  Negative for photophobia, pain,  discharge, itching and visual disturbance.   Respiratory:  Negative for apnea, cough, choking, chest tightness, shortness of breath, wheezing and stridor.    Cardiovascular:  Negative for chest pain, palpitations and leg swelling.   Gastrointestinal:  Negative for abdominal distention, abdominal pain, blood in stool, constipation, diarrhea, nausea and vomiting.   Endocrine: Negative for cold intolerance, heat intolerance, polydipsia, polyphagia and polyuria.   Genitourinary:  Negative for decreased urine volume, difficulty urinating, dysuria, enuresis, flank pain, frequency, hematuria and urgency.   Musculoskeletal:  Negative for arthralgias, joint swelling and myalgias.   Skin:  Negative for rash.   Neurological:  Negative for dizziness, tremors, speech difficulty, weakness, light-headedness, numbness and headaches.   Hematological:  Does not bruise/bleed easily.   Psychiatric/Behavioral:  Negative for dysphoric mood, sleep disturbance and suicidal ideas.          Cardiovascular system: S1-S2 intact  Respiratory clear to auscultation bilateral on deep breathing he feels irritability on the left side of the chest.    Neurological Exam  Mental Status  Awake, alert and oriented to person, place and time. Oriented to person, place and time. Recent and remote memory are intact. Able to copy figure. Clock drawing is normal. Speech is normal. Language is fluent with no aphasia. Attention and concentration are normal. Fund of knowledge is appropriate for level of education. Apraxia absent.    Cranial Nerves  CN II: Visual acuity is normal. Visual fields full to confrontation.  CN III, IV, VI: Extraocular movements intact bilaterally. Normal lids and orbits bilaterally. Pupils equal round and reactive to light bilaterally.  CN V: Facial sensation is normal.  CN VII: Full and symmetric facial movement.  CN VIII: Hearing is normal.  CN IX, X: Palate elevates symmetrically. Normal gag reflex.  CN XI: Shoulder shrug strength is  "normal.  CN XII: Tongue midline without atrophy or fasciculations.    Motor  Normal muscle bulk throughout. No fasciculations present. Normal muscle tone. Strength is 5/5 throughout all four extremities.    Sensory  Light touch is normal in upper and lower extremities. Pinprick is normal in upper and lower extremities. Temperature is normal in upper and lower extremities. Vibration is normal in upper and lower extremities.     Reflexes  Deep tendon reflexes are 2+ and symmetric in all four extremities.    Coordination  Right: Finger-to-nose normal. Heel-to-shin normal.    Gait  Casual gait is normal including stance, stride, and arm swing.        Last Recorded Vitals  Blood pressure 118/78, pulse 85, height 1.626 m (5' 4\"), weight 94 kg (207 lb 3.2 oz), last menstrual period 10/09/2023.    Relevant Results      Current Outpatient Medications:     acetaminophen (Tylenol Extra Strength) 500 mg tablet, Take 2 tablets (1,000 mg) by mouth every 6 hours if needed for mild pain (1 - 3)., Disp: 30 tablet, Rfl: 0    diphenhydrAMINE (BENADryl) 25 mg capsule, Take 1 capsule (25 mg) by mouth every 6 hours if needed (headache)., Disp: 30 capsule, Rfl: 0    ferrous sulfate, 325 mg ferrous sulfate, (iron) tablet, Take 1 tablet by mouth 2 times a day., Disp: 60 tablet, Rfl: 11    magnesium 200 mg tablet, Take 1 tablet (200 mg) by mouth 2 times a day., Disp: 60 tablet, Rfl: 11    prenatal vitamin, iron-folic, (prenatal vit no.130-iron-folic) 27 mg iron-800 mcg folic acid tablet, Take 1 tablet by mouth once daily., Disp: 30 tablet, Rfl: 11    Vitamin B-6 25 mg tablet, TAKE 1 TABLET BY MOUTH EVERY 8 HOURS, Disp: 270 tablet, Rfl: 1     Continuous medications    PRN medications, reviewed                                        I have personally reviewed the following imaging for brain (CT/ MR) and results and discussed in detail with the patient/care giver/family     No results found.     Imaging Brain/Head  No results found for this or " any previous visit.      Imaging Cervical  No results found for this or any previous visit.    Imaging Thoracic  No results found for this or any previous visit.    Imaging Lumbar  No results found for this or any previous visit.          Relevant Results         [unfilled]  [unfilled]  [unfilled]      Assessment/Plan       #migraine headaches  #occipital neuralgia   #28 weeks pregnant   -educated on staying hydrated and diet   -reviewed relaxation techniques and sleep hygiene. Continue massage therapy   -recommend blue light filter with prescription lenses   -continue tylenol. Will trail sumatriptan PRN up to 3xs per week maximum. No other ppx until postpartum. Can trial botox injections if need ppx.   -no bupivacaine-dex injections until postpartum    RTC 6 months or sooner if Headaches worsening (can consider botox)    Patient/Family Education: Extensive time was spent educating the patient on relevant anatomy, clinical findings and imaging, as well as discussing the potential diagnoses as discussed above.  Pharmacology: as above. Exercise: I discussed the importance of maintaining a daily exercise program, including stretching and strengthening. Preventative strategies were reviewed, specifically avoidance of any exercises that exacerbate pain.Return to online virtual visit/ clinic visit for follow-up with HATTIE Stinson in 2 weeks or sooner as needed.The patient expressed understanding and agreement with the assessment and plan.  Patient encouraged to contact us should they have any questions, concerns, or any changes in symptoms. Thank you for allowing me to participate in the care of your patient.** This note is created using speech recognition transcription software. Despite proofreading, several typographical errors might be present that might affect the meaning of the content. Please call with any questions.**      Edward Gallagher,

## 2024-04-30 DIAGNOSIS — O99.012 ANEMIA AFFECTING PREGNANCY IN SECOND TRIMESTER (HHS-HCC): Primary | ICD-10-CM

## 2024-04-30 RX ORDER — DIPHENHYDRAMINE HYDROCHLORIDE 50 MG/ML
50 INJECTION INTRAMUSCULAR; INTRAVENOUS AS NEEDED
Status: CANCELLED | OUTPATIENT
Start: 2024-04-30

## 2024-04-30 RX ORDER — ALBUTEROL SULFATE 0.83 MG/ML
3 SOLUTION RESPIRATORY (INHALATION) AS NEEDED
Status: CANCELLED | OUTPATIENT
Start: 2024-04-30

## 2024-04-30 RX ORDER — EPINEPHRINE 0.3 MG/.3ML
0.3 INJECTION SUBCUTANEOUS EVERY 5 MIN PRN
Status: CANCELLED | OUTPATIENT
Start: 2024-04-30

## 2024-04-30 RX ORDER — HEPARIN SODIUM,PORCINE/PF 10 UNIT/ML
50 SYRINGE (ML) INTRAVENOUS AS NEEDED
Status: CANCELLED | OUTPATIENT
Start: 2024-04-30

## 2024-04-30 RX ORDER — HEPARIN 100 UNIT/ML
500 SYRINGE INTRAVENOUS AS NEEDED
Status: CANCELLED | OUTPATIENT
Start: 2024-04-30

## 2024-05-06 ENCOUNTER — HOSPITAL ENCOUNTER (OUTPATIENT)
Dept: RADIOLOGY | Facility: CLINIC | Age: 24
Discharge: HOME | End: 2024-05-06
Payer: COMMERCIAL

## 2024-05-06 DIAGNOSIS — Z32.01 PREGNANCY TEST POSITIVE (HHS-HCC): ICD-10-CM

## 2024-05-06 DIAGNOSIS — O99.213 OBESITY AFFECTING PREGNANCY IN THIRD TRIMESTER (HHS-HCC): ICD-10-CM

## 2024-05-06 PROCEDURE — 76819 FETAL BIOPHYS PROFIL W/O NST: CPT | Performed by: MEDICAL GENETICS

## 2024-05-06 PROCEDURE — 76819 FETAL BIOPHYS PROFIL W/O NST: CPT

## 2024-05-06 PROCEDURE — 76816 OB US FOLLOW-UP PER FETUS: CPT

## 2024-05-06 PROCEDURE — 76816 OB US FOLLOW-UP PER FETUS: CPT | Performed by: MEDICAL GENETICS

## 2024-05-07 ENCOUNTER — TELEPHONE (OUTPATIENT)
Dept: OBSTETRICS AND GYNECOLOGY | Facility: CLINIC | Age: 24
End: 2024-05-07
Payer: COMMERCIAL

## 2024-05-07 NOTE — TELEPHONE ENCOUNTER
I tried to call Babs, however she was unable to be reached.   I would recommend hand expression to empty her breast and limit stimulation with pumping. Only express until comfort and watch for signs/symptoms of mastitis. Pumping may cause an increase in volume of milk.     I will send her a message also.     Thanks, Minerva

## 2024-05-09 ENCOUNTER — ROUTINE PRENATAL (OUTPATIENT)
Dept: OBSTETRICS AND GYNECOLOGY | Facility: CLINIC | Age: 24
End: 2024-05-09
Payer: COMMERCIAL

## 2024-05-09 VITALS — SYSTOLIC BLOOD PRESSURE: 112 MMHG | WEIGHT: 210.2 LBS | BODY MASS INDEX: 36.08 KG/M2 | DIASTOLIC BLOOD PRESSURE: 64 MMHG

## 2024-05-09 DIAGNOSIS — Z3A.30 30 WEEKS GESTATION OF PREGNANCY (HHS-HCC): ICD-10-CM

## 2024-05-09 DIAGNOSIS — O09.899 MATERNAL VARICELLA, NON-IMMUNE (HHS-HCC): ICD-10-CM

## 2024-05-09 DIAGNOSIS — O99.013 ANEMIA AFFECTING PREGNANCY IN THIRD TRIMESTER (HHS-HCC): ICD-10-CM

## 2024-05-09 DIAGNOSIS — O28.3 NUCHAL FOLD THICKENING ON PRENATAL ULTRASOUND: ICD-10-CM

## 2024-05-09 DIAGNOSIS — Z28.39 MATERNAL VARICELLA, NON-IMMUNE (HHS-HCC): ICD-10-CM

## 2024-05-09 DIAGNOSIS — Z28.39 RUBELLA NON-IMMUNE STATUS, ANTEPARTUM (HHS-HCC): ICD-10-CM

## 2024-05-09 DIAGNOSIS — O09.899 RUBELLA NON-IMMUNE STATUS, ANTEPARTUM (HHS-HCC): ICD-10-CM

## 2024-05-09 DIAGNOSIS — O34.219 UTERINE SCAR FROM PREVIOUS CESAREAN DELIVERY AFFECTING PREGNANCY (HHS-HCC): Primary | ICD-10-CM

## 2024-05-09 DIAGNOSIS — O99.210 OBESITY IN PREGNANCY (HHS-HCC): ICD-10-CM

## 2024-05-09 PROCEDURE — 99213 OFFICE O/P EST LOW 20 MIN: CPT | Performed by: MIDWIFE

## 2024-05-09 NOTE — PROGRESS NOTES
"Subjective   Patient ID [id number]   Babs Brown is a 24 y.o.  at 30w3d with a working estimated date of delivery of 7/15/2024, by Last Menstrual Period who presents for a routine prenatal visit. She endorses regular fetal movement and denies blurred vision, chest or RUQ pain, abdominal or urinary discomfort, vaginal bleeding or LOF, or edema. She was evaluated by neurology and they made plan to start postpartum. In the meantime she is tolerating headaches and using Tylenol PRN.  She is scheduled for IV iron this month.     Her pregnancy is complicated by:  -previous LTCS with short interval pregnancies  -desires TOLAC, MFMU 56.8%  -obesity   -FH spina bifida, uncle with surgical repair   -migraines, s/p evaluation by neurology   -thickened NT, cleared by cardio and followed by MFM  -impacted wisdom teeth, managed by dentistry     Objective   Physical Exam: NAD, easy respiratory effort, CN grossly intact, no edema; alert & oriented @ baseline    Weight: 95.3 kg (210 lb 3.2 oz)  Expected Total Weight Gain: Could not be calculated   Pregravid BMI: Could not be calculated  BP: 112/64                  Prenatal Labs  Urine Dip:  Lab Results   Component Value Date    KETONESU NEGATIVE 2024     Lab Results   Component Value Date    HGB 9.7 (L) 2024    HCT 29.8 (L) 2024    ABO B 2024    HEPBSAG Nonreactive 2023     No results found for: \"PAPPA\", \"AFP\", \"HCG\", \"ESTRIOL\", \"INHBA\"  No results found for: \"GLUF\", \"GLUT1\", \"[id number]\", \"[id number]\"    Imaging  See imaging reports           Assessment/Plan   Diagnoses and all orders for this visit:  Uterine scar from previous  delivery affecting pregnancy ([facility])  Maternal varicella, non-immune ([facility])  Rubella non-immune status, antepartum ([facility])  Obesity in pregnancy ([facility])  Nuchal fold thickening on prenatal ultrasound  Anemia affecting pregnancy in third trimester ([facility])  30 weeks gestation of pregnancy " ([facility])  Continue prenatal vitamin.  Labs reviewed, most recent Hgb <10 despite oral iron - IV therapy upcoming.  Expected mode of delivery TOLAC   Follow up in 2 week for a routine prenatal visit.    PURNIMA CAMEJO

## 2024-05-16 ENCOUNTER — INFUSION (OUTPATIENT)
Dept: INFUSION THERAPY | Facility: HOSPITAL | Age: 24
End: 2024-05-16
Payer: COMMERCIAL

## 2024-05-16 VITALS
SYSTOLIC BLOOD PRESSURE: 97 MMHG | RESPIRATION RATE: 16 BRPM | TEMPERATURE: 97.4 F | HEART RATE: 92 BPM | DIASTOLIC BLOOD PRESSURE: 67 MMHG | OXYGEN SATURATION: 97 %

## 2024-05-16 DIAGNOSIS — O99.012 ANEMIA AFFECTING PREGNANCY IN SECOND TRIMESTER (HHS-HCC): ICD-10-CM

## 2024-05-16 PROCEDURE — 2500000004 HC RX 250 GENERAL PHARMACY W/ HCPCS (ALT 636 FOR OP/ED): Performed by: NURSE PRACTITIONER

## 2024-05-16 PROCEDURE — 96365 THER/PROPH/DIAG IV INF INIT: CPT | Mod: INF

## 2024-05-16 PROCEDURE — 96366 THER/PROPH/DIAG IV INF ADDON: CPT | Mod: INF

## 2024-05-16 RX ORDER — EPINEPHRINE 0.3 MG/.3ML
0.3 INJECTION SUBCUTANEOUS EVERY 5 MIN PRN
Status: CANCELLED | OUTPATIENT
Start: 2024-05-20

## 2024-05-16 RX ORDER — HEPARIN SODIUM,PORCINE/PF 10 UNIT/ML
50 SYRINGE (ML) INTRAVENOUS AS NEEDED
Status: CANCELLED | OUTPATIENT
Start: 2024-05-16

## 2024-05-16 RX ORDER — FAMOTIDINE 10 MG/ML
20 INJECTION INTRAVENOUS ONCE AS NEEDED
Status: CANCELLED | OUTPATIENT
Start: 2024-05-20

## 2024-05-16 RX ORDER — ALBUTEROL SULFATE 0.83 MG/ML
3 SOLUTION RESPIRATORY (INHALATION) AS NEEDED
Status: CANCELLED | OUTPATIENT
Start: 2024-05-20

## 2024-05-16 RX ORDER — DIPHENHYDRAMINE HYDROCHLORIDE 50 MG/ML
50 INJECTION INTRAMUSCULAR; INTRAVENOUS AS NEEDED
Status: CANCELLED | OUTPATIENT
Start: 2024-05-20

## 2024-05-16 RX ORDER — HEPARIN 100 UNIT/ML
500 SYRINGE INTRAVENOUS AS NEEDED
Status: CANCELLED | OUTPATIENT
Start: 2024-05-16

## 2024-05-16 RX ADMIN — IRON SUCROSE 300 MG: 20 INJECTION, SOLUTION INTRAVENOUS at 11:54

## 2024-05-16 ASSESSMENT — ENCOUNTER SYMPTOMS
OCCASIONAL FEELINGS OF UNSTEADINESS: 0
DEPRESSION: 0
LOSS OF SENSATION IN FEET: 0

## 2024-05-16 ASSESSMENT — PATIENT HEALTH QUESTIONNAIRE - PHQ9
2. FEELING DOWN, DEPRESSED OR HOPELESS: NOT AT ALL
SUM OF ALL RESPONSES TO PHQ9 QUESTIONS 1 AND 2: 0
1. LITTLE INTEREST OR PLEASURE IN DOING THINGS: NOT AT ALL

## 2024-05-20 ENCOUNTER — ROUTINE PRENATAL (OUTPATIENT)
Dept: OBSTETRICS AND GYNECOLOGY | Facility: CLINIC | Age: 24
End: 2024-05-20
Payer: COMMERCIAL

## 2024-05-20 VITALS — SYSTOLIC BLOOD PRESSURE: 102 MMHG | WEIGHT: 211 LBS | BODY MASS INDEX: 36.22 KG/M2 | DIASTOLIC BLOOD PRESSURE: 62 MMHG

## 2024-05-20 DIAGNOSIS — O09.899 MATERNAL VARICELLA, NON-IMMUNE (HHS-HCC): ICD-10-CM

## 2024-05-20 DIAGNOSIS — Z28.39 MATERNAL VARICELLA, NON-IMMUNE (HHS-HCC): ICD-10-CM

## 2024-05-20 DIAGNOSIS — Z3A.32 32 WEEKS GESTATION OF PREGNANCY (HHS-HCC): ICD-10-CM

## 2024-05-20 DIAGNOSIS — O09.899 RUBELLA NON-IMMUNE STATUS, ANTEPARTUM (HHS-HCC): Primary | ICD-10-CM

## 2024-05-20 DIAGNOSIS — Z87.59 HISTORY OF MIGRAINE DURING PREGNANCY: ICD-10-CM

## 2024-05-20 DIAGNOSIS — Z28.39 RUBELLA NON-IMMUNE STATUS, ANTEPARTUM (HHS-HCC): Primary | ICD-10-CM

## 2024-05-20 DIAGNOSIS — O99.013 ANEMIA AFFECTING PREGNANCY IN THIRD TRIMESTER (HHS-HCC): ICD-10-CM

## 2024-05-20 DIAGNOSIS — O28.3 NUCHAL FOLD THICKENING ON PRENATAL ULTRASOUND: ICD-10-CM

## 2024-05-20 DIAGNOSIS — Z98.891 H/O CESAREAN SECTION: ICD-10-CM

## 2024-05-20 DIAGNOSIS — Z86.69 HISTORY OF MIGRAINE DURING PREGNANCY: ICD-10-CM

## 2024-05-20 DIAGNOSIS — O34.219 UTERINE SCAR FROM PREVIOUS CESAREAN DELIVERY AFFECTING PREGNANCY (HHS-HCC): ICD-10-CM

## 2024-05-20 PROCEDURE — 99213 OFFICE O/P EST LOW 20 MIN: CPT | Performed by: NURSE PRACTITIONER

## 2024-05-20 NOTE — PROGRESS NOTES
"Subjective   Patient ID [id number]   Babs Brown is a 24 y.o.  at 30w3d with a working estimated date of delivery of 7/15/2024, by Last Menstrual Period who presents for a routine prenatal visit. She endorses regular fetal movement and denies blurred vision, chest or RUQ pain, abdominal or urinary discomfort, vaginal bleeding or LOF, or edema. She was evaluated by neurology and they made plan to start postpartum. In the meantime she is tolerating headaches and using Tylenol PRN.    Following with IV iron.     Bothersome skin tags on left inner thigh discussed.     Her pregnancy is complicated by:  -previous LTCS with short interval pregnancies  -desires TOLAC, MFMU 56.8%  -obesity   -FH spina bifida, uncle with surgical repair   -migraines, s/p evaluation by neurology   -thickened NT, cleared by cardio and followed by MFM  -impacted wisdom teeth, managed by dentistry     Objective   Physical Exam: NAD, easy respiratory effort, CN grossly intact, no edema; alert & oriented @ baseline    Weight: 95.7 kg (211 lb)  Expected Total Weight Gain: Could not be calculated   Pregravid BMI: Could not be calculated  BP: 102/62  Fetal Heart Rate: 142 Fundal Height (cm): 33 cm Presentation: Vertex           Prenatal Labs  Urine Dip:  Lab Results   Component Value Date    KETONESU NEGATIVE 2024     Lab Results   Component Value Date    HGB 9.7 (L) 2024    HCT 29.8 (L) 2024    ABO B 2024    HEPBSAG Nonreactive 2023     No results found for: \"PAPPA\", \"AFP\", \"HCG\", \"ESTRIOL\", \"INHBA\"  No results found for: \"GLUF\", \"GLUT1\", \"DCVJDJT4XG\", \"[id number]\"    Imaging  See imaging reports           Assessment/Plan   Diagnoses and all orders for this visit:  Uterine scar from previous  delivery affecting pregnancy ([facility])  Maternal varicella, non-immune ([facility]HS-HCC)  Rubella non-immune status, antepartum ([facility]HS-HCC)  Obesity in pregnancy ([facility]HS-HCC)  Nuchal fold thickening on prenatal " ultrasound  Anemia affecting pregnancy in third trimester ([facility]-HCC)  32 weeks gestation of pregnancy ([facility])  Continue prenatal vitamin.  Labs reviewed, most recent Hgb <10 despite oral iron - IV therapy in progress  Expected mode of delivery TOLAC   -->consent signed 5/20/2024  PP contraception discussed   Follow up in 2 week for a routine prenatal visit.    Pt would like skin tags reviewed. I discussed r/b and she decided to have this completed PP with IUD insertion at 6 weeks.     Luisa Farah, APRN-CNM, APRN-CNP

## 2024-05-23 ENCOUNTER — INFUSION (OUTPATIENT)
Dept: INFUSION THERAPY | Facility: HOSPITAL | Age: 24
End: 2024-05-23
Payer: COMMERCIAL

## 2024-05-23 VITALS
RESPIRATION RATE: 18 BRPM | SYSTOLIC BLOOD PRESSURE: 110 MMHG | OXYGEN SATURATION: 98 % | DIASTOLIC BLOOD PRESSURE: 76 MMHG | HEART RATE: 100 BPM

## 2024-05-23 DIAGNOSIS — O99.012 ANEMIA AFFECTING PREGNANCY IN SECOND TRIMESTER (HHS-HCC): ICD-10-CM

## 2024-05-23 PROCEDURE — 2500000004 HC RX 250 GENERAL PHARMACY W/ HCPCS (ALT 636 FOR OP/ED): Performed by: NURSE PRACTITIONER

## 2024-05-23 PROCEDURE — 96366 THER/PROPH/DIAG IV INF ADDON: CPT | Mod: INF

## 2024-05-23 PROCEDURE — 96365 THER/PROPH/DIAG IV INF INIT: CPT | Mod: INF

## 2024-05-23 RX ORDER — FAMOTIDINE 10 MG/ML
20 INJECTION INTRAVENOUS ONCE AS NEEDED
Status: CANCELLED | OUTPATIENT
Start: 2024-05-24

## 2024-05-23 RX ORDER — EPINEPHRINE 0.3 MG/.3ML
0.3 INJECTION SUBCUTANEOUS EVERY 5 MIN PRN
Status: CANCELLED | OUTPATIENT
Start: 2024-05-24

## 2024-05-23 RX ORDER — DIPHENHYDRAMINE HYDROCHLORIDE 50 MG/ML
50 INJECTION INTRAMUSCULAR; INTRAVENOUS AS NEEDED
Status: CANCELLED | OUTPATIENT
Start: 2024-05-24

## 2024-05-23 RX ORDER — HEPARIN SODIUM,PORCINE/PF 10 UNIT/ML
50 SYRINGE (ML) INTRAVENOUS AS NEEDED
Status: CANCELLED | OUTPATIENT
Start: 2024-05-23

## 2024-05-23 RX ORDER — ALBUTEROL SULFATE 0.83 MG/ML
3 SOLUTION RESPIRATORY (INHALATION) AS NEEDED
Status: CANCELLED | OUTPATIENT
Start: 2024-05-24

## 2024-05-23 RX ORDER — HEPARIN 100 UNIT/ML
500 SYRINGE INTRAVENOUS AS NEEDED
Status: CANCELLED | OUTPATIENT
Start: 2024-05-23

## 2024-05-23 RX ADMIN — IRON SUCROSE 300 MG: 20 INJECTION, SOLUTION INTRAVENOUS at 11:49

## 2024-05-23 ASSESSMENT — PATIENT HEALTH QUESTIONNAIRE - PHQ9
1. LITTLE INTEREST OR PLEASURE IN DOING THINGS: NOT AT ALL
2. FEELING DOWN, DEPRESSED OR HOPELESS: NOT AT ALL
SUM OF ALL RESPONSES TO PHQ9 QUESTIONS 1 AND 2: 0

## 2024-05-23 ASSESSMENT — ENCOUNTER SYMPTOMS
OCCASIONAL FEELINGS OF UNSTEADINESS: 0
LOSS OF SENSATION IN FEET: 0
DEPRESSION: 0

## 2024-05-23 ASSESSMENT — COLUMBIA-SUICIDE SEVERITY RATING SCALE - C-SSRS
2. HAVE YOU ACTUALLY HAD ANY THOUGHTS OF KILLING YOURSELF?: NO
6. HAVE YOU EVER DONE ANYTHING, STARTED TO DO ANYTHING, OR PREPARED TO DO ANYTHING TO END YOUR LIFE?: NO
1. IN THE PAST MONTH, HAVE YOU WISHED YOU WERE DEAD OR WISHED YOU COULD GO TO SLEEP AND NOT WAKE UP?: NO

## 2024-05-23 ASSESSMENT — PAIN SCALES - GENERAL: PAINLEVEL: 6

## 2024-05-28 ENCOUNTER — APPOINTMENT (OUTPATIENT)
Dept: NEUROLOGY | Facility: CLINIC | Age: 24
End: 2024-05-28
Payer: COMMERCIAL

## 2024-05-29 ENCOUNTER — TELEPHONE (OUTPATIENT)
Dept: OBSTETRICS AND GYNECOLOGY | Facility: CLINIC | Age: 24
End: 2024-05-29
Payer: COMMERCIAL

## 2024-05-29 NOTE — TELEPHONE ENCOUNTER
Patient is 33 weeks pregnant, she states when she walks and puts pressure on her foot, her lower back on the right side hurts, she is unsure if this is a concern? Please advise

## 2024-05-30 ENCOUNTER — INFUSION (OUTPATIENT)
Dept: INFUSION THERAPY | Facility: HOSPITAL | Age: 24
End: 2024-05-30
Payer: COMMERCIAL

## 2024-05-30 VITALS
DIASTOLIC BLOOD PRESSURE: 71 MMHG | OXYGEN SATURATION: 97 % | RESPIRATION RATE: 16 BRPM | SYSTOLIC BLOOD PRESSURE: 110 MMHG | HEART RATE: 87 BPM | TEMPERATURE: 97.2 F

## 2024-05-30 DIAGNOSIS — O99.012 ANEMIA AFFECTING PREGNANCY IN SECOND TRIMESTER (HHS-HCC): ICD-10-CM

## 2024-05-30 PROCEDURE — 96366 THER/PROPH/DIAG IV INF ADDON: CPT | Mod: INF

## 2024-05-30 PROCEDURE — 2500000004 HC RX 250 GENERAL PHARMACY W/ HCPCS (ALT 636 FOR OP/ED): Performed by: NURSE PRACTITIONER

## 2024-05-30 PROCEDURE — 96365 THER/PROPH/DIAG IV INF INIT: CPT | Mod: INF

## 2024-05-30 RX ORDER — HEPARIN SODIUM,PORCINE/PF 10 UNIT/ML
50 SYRINGE (ML) INTRAVENOUS AS NEEDED
OUTPATIENT
Start: 2024-05-30

## 2024-05-30 RX ORDER — HEPARIN 100 UNIT/ML
500 SYRINGE INTRAVENOUS AS NEEDED
OUTPATIENT
Start: 2024-05-30

## 2024-05-30 RX ORDER — FAMOTIDINE 10 MG/ML
20 INJECTION INTRAVENOUS ONCE AS NEEDED
OUTPATIENT
Start: 2024-05-31

## 2024-05-30 RX ORDER — DIPHENHYDRAMINE HYDROCHLORIDE 50 MG/ML
50 INJECTION INTRAMUSCULAR; INTRAVENOUS AS NEEDED
OUTPATIENT
Start: 2024-05-31

## 2024-05-30 RX ORDER — ALBUTEROL SULFATE 0.83 MG/ML
3 SOLUTION RESPIRATORY (INHALATION) AS NEEDED
OUTPATIENT
Start: 2024-05-31

## 2024-05-30 RX ORDER — EPINEPHRINE 0.3 MG/.3ML
0.3 INJECTION SUBCUTANEOUS EVERY 5 MIN PRN
OUTPATIENT
Start: 2024-05-31

## 2024-05-30 RX ADMIN — IRON SUCROSE 300 MG: 20 INJECTION, SOLUTION INTRAVENOUS at 11:24

## 2024-05-30 ASSESSMENT — PAIN SCALES - GENERAL: PAINLEVEL: 6

## 2024-05-30 ASSESSMENT — ENCOUNTER SYMPTOMS
DEPRESSION: 0
LOSS OF SENSATION IN FEET: 0
OCCASIONAL FEELINGS OF UNSTEADINESS: 0

## 2024-06-03 ENCOUNTER — TELEPHONE (OUTPATIENT)
Dept: OBSTETRICS AND GYNECOLOGY | Facility: CLINIC | Age: 24
End: 2024-06-03
Payer: COMMERCIAL

## 2024-06-06 ENCOUNTER — ROUTINE PRENATAL (OUTPATIENT)
Dept: OBSTETRICS AND GYNECOLOGY | Facility: CLINIC | Age: 24
End: 2024-06-06
Payer: COMMERCIAL

## 2024-06-06 VITALS — DIASTOLIC BLOOD PRESSURE: 70 MMHG | BODY MASS INDEX: 36.73 KG/M2 | SYSTOLIC BLOOD PRESSURE: 110 MMHG | WEIGHT: 214 LBS

## 2024-06-06 DIAGNOSIS — O09.899 RUBELLA NON-IMMUNE STATUS, ANTEPARTUM (HHS-HCC): ICD-10-CM

## 2024-06-06 DIAGNOSIS — O09.899 MATERNAL VARICELLA, NON-IMMUNE (HHS-HCC): ICD-10-CM

## 2024-06-06 DIAGNOSIS — O99.013 ANEMIA AFFECTING PREGNANCY IN THIRD TRIMESTER (HHS-HCC): ICD-10-CM

## 2024-06-06 DIAGNOSIS — Z28.39 RUBELLA NON-IMMUNE STATUS, ANTEPARTUM (HHS-HCC): ICD-10-CM

## 2024-06-06 DIAGNOSIS — Z3A.34 34 WEEKS GESTATION OF PREGNANCY (HHS-HCC): ICD-10-CM

## 2024-06-06 DIAGNOSIS — O34.219 UTERINE SCAR FROM PREVIOUS CESAREAN DELIVERY AFFECTING PREGNANCY (HHS-HCC): Primary | ICD-10-CM

## 2024-06-06 DIAGNOSIS — Z28.39 MATERNAL VARICELLA, NON-IMMUNE (HHS-HCC): ICD-10-CM

## 2024-06-06 PROCEDURE — 99213 OFFICE O/P EST LOW 20 MIN: CPT | Performed by: MIDWIFE

## 2024-06-06 NOTE — PROGRESS NOTES
"Subjective   Patient ID [id number]   Babs Brown is a 24 y.o.  at 34w3d with a working estimated date of delivery of 7/15/2024, by Last Menstrual Period who presents for a routine prenatal visit. She endorses regular fetal movement and denies HA, blurred vision, chest or RUQ pain, abdominal or urinary discomfort, vaginal bleeding or LOF, or edema. She is having intermittent RIGHT sciatica for about the last week and is attempting to manage with ice, heat, and rest. She finished IV iron infusions, last dose was one week ago.     Her pregnancy is complicated by:  -h/o LTCS, desires TOLAC     We discussed comfort measures for sciatica and she was encouraged to discontinue heat application. We reviewed remainder of pregnancy care, follow up anemia labs, and GBS next visit. She would like to complete labs next visit.     Objective   Physical Exam: NAD, easy respiratory effort, CN grossly intact, no edema; alert & oriented @ baseline    Weight: 97.1 kg (214 lb)  Expected Total Weight Gain: Could not be calculated   Pregravid BMI: Could not be calculated  BP: 110/70                  Prenatal Labs  Urine Dip:  Lab Results   Component Value Date    KETONESU NEGATIVE 2024     Lab Results   Component Value Date    HGB 9.7 (L) 2024    HCT 29.8 (L) 2024    ABO B 2024    HEPBSAG Nonreactive 2023     No results found for: \"PAPPA\", \"AFP\", \"HCG\", \"ESTRIOL\", \"INHBA\"  No results found for: \"GLUF\", \"GLUT1\", \"GWNZCFT8RL\", \"[id number]\"    Imaging  See imaging reports.           Assessment/Plan   Diagnoses and all orders for this visit:  Uterine scar from previous  delivery affecting pregnancy ([facility]-HCC)  Anemia affecting pregnancy in third trimester ([facility]-HCC)  Rubella non-immune status, antepartum ([facility]-HCC)  Maternal varicella, non-immune ([facility]-HCC)  34 weeks gestation of pregnancy ([facility])  Continue prenatal vitamin.  Labs reviewed, anemia follow up labs and GBS due next visit.  Expected " mode of delivery TOLAC  Follow up in 2 weeks for a routine prenatal visit.    PURNIMA CAMEJO

## 2024-06-07 ENCOUNTER — TELEPHONE (OUTPATIENT)
Dept: OBSTETRICS AND GYNECOLOGY | Facility: CLINIC | Age: 24
End: 2024-06-07

## 2024-06-10 DIAGNOSIS — M54.40 ACUTE BACK PAIN WITH SCIATICA, UNSPECIFIED LATERALITY: Primary | ICD-10-CM

## 2024-06-10 DIAGNOSIS — R10.2 PELVIC PAIN AFFECTING PREGNANCY IN THIRD TRIMESTER, ANTEPARTUM (HHS-HCC): ICD-10-CM

## 2024-06-10 DIAGNOSIS — O26.893 PELVIC PAIN AFFECTING PREGNANCY IN THIRD TRIMESTER, ANTEPARTUM (HHS-HCC): ICD-10-CM

## 2024-06-10 RX ORDER — LIDOCAINE 50 MG/G
1 PATCH TOPICAL DAILY
Qty: 7 PATCH | Refills: 0 | Status: SHIPPED | OUTPATIENT
Start: 2024-06-10 | End: 2024-06-17

## 2024-06-10 NOTE — TELEPHONE ENCOUNTER
Pt called about her sciatic pain and has multiple questions about over the counter meds. Please call her at [phone].  
Pt calling back today. She has not heard back and the 4% lidocaine patches are not working. She is taking Tylenol every 6-8 hours. Is there something stronger she can use?     
4

## 2024-06-16 NOTE — PROGRESS NOTES
"Subjective   Patient ID [id number]   Babs Brown is a 24 y.o.  at 34w3d with a working estimated date of delivery of 7/15/2024, by Last Menstrual Period who presents for a routine prenatal visit. She endorses regular fetal movement and denies HA, blurred vision, chest or RUQ pain, abdominal or urinary discomfort, vaginal bleeding or LOF, or edema. She is having intermittent RIGHT sciatica for about the last few week and is attempting to manage with ice, heat, and rest. She finished IV iron infusions.    Her pregnancy is complicated by:  -h/o LTCS, desires TOLAC     We discussed comfort measures for sciatica and she was encouraged to discontinue heat application. We reviewed remainder of pregnancy care, follow up anemia labs, and GBS next visit. She would like to complete labs next visit.     Objective   Physical Exam: NAD, easy respiratory effort, CN grossly intact, no edema; alert & oriented @ baseline    Weight: 96.6 kg (213 lb)  Expected Total Weight Gain: Could not be calculated   Pregravid BMI: Could not be calculated  BP: 106/60  Fetal Heart Rate: 144 Fundal Height (cm): 36 cm Presentation: Vertex           Prenatal Labs  Urine Dip:  Lab Results   Component Value Date    KETONESU NEGATIVE 2024    GLUCOSEUR NEGATIVE 2024     Lab Results   Component Value Date    HGB 9.7 (L) 2024    HCT 29.8 (L) 2024    ABO B 2024    HEPBSAG Nonreactive 2023     No results found for: \"PAPPA\", \"AFP\", \"HCG\", \"ESTRIOL\", \"INHBA\"  No results found for: \"GLUF\", \"GLUT1\", \"SDRMMMG4AN\", \"[id number]\"    Imaging  See imaging reports.           Babs was seen today for routine prenatal visit.  Diagnoses and all orders for this visit:  36 weeks gestation of pregnancy ([facility]) (Primary)  -     Group B Streptococcus (GBS) Prenatal Screen, Culture  -     POCT urinalysis dipstick manually resulted  Anemia affecting pregnancy in third trimester ([facility])  Rubella non-immune status, antepartum " ([facility])  H/O  section  Nuchal fold thickening on prenatal ultrasound  History of migraine during pregnancy  Family history of spina bifida  Right sciatic nerve pain     Continue prenatal vitamin.  Labs reviewed, anemia follow up labs and GBS due next visit.  Expected mode of delivery TOLAC  Follow up in 1 weeks for a routine prenatal visit.  Unable to get in to PT at this time for sciatic pain.     Luisa Farah, APRN-CNM, APRN-CNP

## 2024-06-17 ENCOUNTER — APPOINTMENT (OUTPATIENT)
Dept: OBSTETRICS AND GYNECOLOGY | Facility: CLINIC | Age: 24
End: 2024-06-17
Payer: COMMERCIAL

## 2024-06-17 VITALS — BODY MASS INDEX: 36.56 KG/M2 | SYSTOLIC BLOOD PRESSURE: 106 MMHG | WEIGHT: 213 LBS | DIASTOLIC BLOOD PRESSURE: 60 MMHG

## 2024-06-17 DIAGNOSIS — Z28.39 RUBELLA NON-IMMUNE STATUS, ANTEPARTUM (HHS-HCC): ICD-10-CM

## 2024-06-17 DIAGNOSIS — Z82.79 FAMILY HISTORY OF SPINA BIFIDA: ICD-10-CM

## 2024-06-17 DIAGNOSIS — M54.31 RIGHT SCIATIC NERVE PAIN: ICD-10-CM

## 2024-06-17 DIAGNOSIS — O99.013 ANEMIA AFFECTING PREGNANCY IN THIRD TRIMESTER (HHS-HCC): ICD-10-CM

## 2024-06-17 DIAGNOSIS — Z98.891 H/O CESAREAN SECTION: ICD-10-CM

## 2024-06-17 DIAGNOSIS — Z87.59 HISTORY OF MIGRAINE DURING PREGNANCY: ICD-10-CM

## 2024-06-17 DIAGNOSIS — Z86.69 HISTORY OF MIGRAINE DURING PREGNANCY: ICD-10-CM

## 2024-06-17 DIAGNOSIS — O09.899 RUBELLA NON-IMMUNE STATUS, ANTEPARTUM (HHS-HCC): ICD-10-CM

## 2024-06-17 DIAGNOSIS — Z3A.36 36 WEEKS GESTATION OF PREGNANCY (HHS-HCC): Primary | ICD-10-CM

## 2024-06-17 DIAGNOSIS — O28.3 NUCHAL FOLD THICKENING ON PRENATAL ULTRASOUND: ICD-10-CM

## 2024-06-17 LAB
GLUCOSE URINE, POC: NEGATIVE
URINE PROTEIN, POC: NORMAL

## 2024-06-17 PROCEDURE — 99213 OFFICE O/P EST LOW 20 MIN: CPT | Performed by: NURSE PRACTITIONER

## 2024-06-17 PROCEDURE — 87081 CULTURE SCREEN ONLY: CPT

## 2024-06-20 LAB — GP B STREP GENITAL QL CULT: NORMAL

## 2024-06-21 NOTE — PROGRESS NOTES
"Subjective   Patient ID [id number]   Babs Brown is a 24 y.o.  at 37w0d with a working estimated date of delivery of 7/15/2024, by Last Menstrual Period who presents for a routine prenatal visit. She endorses regular fetal movement and denies HA, blurred vision, chest or RUQ pain, abdominal or urinary discomfort, vaginal bleeding or LOF, or edema.     Follow up from Triage- headache without PEC s/s. Pt states the headache improved with tylenol and rest. UTI at hospital, pending urine culture.     Her pregnancy is complicated by:  -h/o LTCS, desires TOLAC     We discussed comfort measures for sciatica and she was encouraged to discontinue heat application. We reviewed remainder of pregnancy care, follow up anemia labs, and GBS next visit. She would like to complete labs next visit.     Objective   Physical Exam: NAD, easy respiratory effort, CN grossly intact, no edema; alert & oriented @ baseline    Weight: 96.2 kg (212 lb)  Expected Total Weight Gain: Could not be calculated   Pregravid BMI: Could not be calculated  BP: 100/70  Fetal Heart Rate: 15 Fundal Height (cm): 37 cm Presentation: Vertex           Prenatal Labs  Urine Dip:  Lab Results   Component Value Date    KETONESU NEGATIVE 2024    GLUCOSEUR NEGATIVE 2024     Lab Results   Component Value Date    HGB 11.9 (L) 2024    HCT 35.3 (L) 2024    ABO B 2024    HEPBSAG Nonreactive 2023     No results found for: \"PAPPA\", \"AFP\", \"HCG\", \"ESTRIOL\", \"INHBA\"  No results found for: \"GLUF\", \"GLUT1\", \"MGFBZKU5DP\", \"[id number]\"    Imaging  See imaging reports.           Babs was seen today for routine prenatal visit.  Diagnoses and all orders for this visit:  Uterine scar from previous  delivery affecting pregnancy ([facility]) (Primary)  H/O  section  37 weeks gestation of pregnancy ([facility]-HCC)  Anemia affecting pregnancy in second trimester ([facility]-HCC)  Other migraine with status migrainosus, not intractable  Nuchal " fold thickening on prenatal ultrasound  Rubella non-immune status, antepartum ([facility]-HCC)       Continue prenatal vitamin.  Labs reviewed, anemia follow up labs and GBS due next visit.  Expected mode of delivery TOLAC  Follow up in 1 weeks for a routine prenatal visit.      Luisa Farah, APRN-EDDIE, APRN-CNP

## 2024-06-23 ENCOUNTER — HOSPITAL ENCOUNTER (OUTPATIENT)
Facility: HOSPITAL | Age: 24
Discharge: HOME | End: 2024-06-23
Attending: OBSTETRICS & GYNECOLOGY | Admitting: OBSTETRICS & GYNECOLOGY
Payer: COMMERCIAL

## 2024-06-23 ENCOUNTER — HOSPITAL ENCOUNTER (OUTPATIENT)
Facility: HOSPITAL | Age: 24
End: 2024-06-23
Attending: OBSTETRICS & GYNECOLOGY | Admitting: OBSTETRICS & GYNECOLOGY
Payer: COMMERCIAL

## 2024-06-23 VITALS
TEMPERATURE: 97.2 F | SYSTOLIC BLOOD PRESSURE: 113 MMHG | HEART RATE: 88 BPM | OXYGEN SATURATION: 97 % | HEIGHT: 64 IN | RESPIRATION RATE: 18 BRPM | DIASTOLIC BLOOD PRESSURE: 74 MMHG | BODY MASS INDEX: 36.17 KG/M2 | WEIGHT: 211.86 LBS

## 2024-06-23 DIAGNOSIS — N30.00 ACUTE CYSTITIS WITHOUT HEMATURIA: Primary | ICD-10-CM

## 2024-06-23 DIAGNOSIS — M54.40 ACUTE BACK PAIN WITH SCIATICA, UNSPECIFIED LATERALITY: ICD-10-CM

## 2024-06-23 LAB
ALBUMIN SERPL BCP-MCNC: 3.2 G/DL (ref 3.4–5)
ALP SERPL-CCNC: 225 U/L (ref 33–110)
ALT SERPL W P-5'-P-CCNC: 8 U/L (ref 7–45)
AMORPH CRY #/AREA UR COMP ASSIST: ABNORMAL /HPF
ANION GAP SERPL CALC-SCNC: 12 MMOL/L (ref 10–20)
APPEARANCE UR: ABNORMAL
AST SERPL W P-5'-P-CCNC: 18 U/L (ref 9–39)
BACTERIA #/AREA URNS AUTO: ABNORMAL /HPF
BILIRUB SERPL-MCNC: 0.3 MG/DL (ref 0–1.2)
BILIRUB UR STRIP.AUTO-MCNC: NEGATIVE MG/DL
BUN SERPL-MCNC: 12 MG/DL (ref 6–23)
CALCIUM SERPL-MCNC: 8.8 MG/DL (ref 8.6–10.3)
CHLORIDE SERPL-SCNC: 106 MMOL/L (ref 98–107)
CO2 SERPL-SCNC: 21 MMOL/L (ref 21–32)
COLOR UR: YELLOW
CREAT SERPL-MCNC: 0.51 MG/DL (ref 0.5–1.05)
CREAT UR-MCNC: 130.5 MG/DL (ref 20–320)
EGFRCR SERPLBLD CKD-EPI 2021: >90 ML/MIN/1.73M*2
ERYTHROCYTE [DISTWIDTH] IN BLOOD BY AUTOMATED COUNT: 18.1 % (ref 11.5–14.5)
GLUCOSE SERPL-MCNC: 92 MG/DL (ref 74–99)
GLUCOSE UR STRIP.AUTO-MCNC: NORMAL MG/DL
HCT VFR BLD AUTO: 35.3 % (ref 36–46)
HGB BLD-MCNC: 11.9 G/DL (ref 12–16)
KETONES UR STRIP.AUTO-MCNC: NEGATIVE MG/DL
LDH SERPL L TO P-CCNC: 215 U/L (ref 84–246)
LEUKOCYTE ESTERASE UR QL STRIP.AUTO: ABNORMAL
MCH RBC QN AUTO: 29.1 PG (ref 26–34)
MCHC RBC AUTO-ENTMCNC: 33.7 G/DL (ref 32–36)
MCV RBC AUTO: 86 FL (ref 80–100)
MUCOUS THREADS #/AREA URNS AUTO: ABNORMAL /LPF
NITRITE UR QL STRIP.AUTO: NEGATIVE
NRBC BLD-RTO: 0 /100 WBCS (ref 0–0)
PH UR STRIP.AUTO: 6.5 [PH]
PLATELET # BLD AUTO: 250 X10*3/UL (ref 150–450)
POTASSIUM SERPL-SCNC: 4.4 MMOL/L (ref 3.5–5.3)
PROT SERPL-MCNC: 6.1 G/DL (ref 6.4–8.2)
PROT UR STRIP.AUTO-MCNC: ABNORMAL MG/DL
PROT UR-ACNC: 24 MG/DL (ref 5–24)
PROT/CREAT UR: 0.18 MG/MG CREAT (ref 0–0.17)
RBC # BLD AUTO: 4.09 X10*6/UL (ref 4–5.2)
RBC # UR STRIP.AUTO: NEGATIVE /UL
RBC #/AREA URNS AUTO: ABNORMAL /HPF
SODIUM SERPL-SCNC: 135 MMOL/L (ref 136–145)
SP GR UR STRIP.AUTO: 1.03
SQUAMOUS #/AREA URNS AUTO: ABNORMAL /HPF
UROBILINOGEN UR STRIP.AUTO-MCNC: NORMAL MG/DL
WBC # BLD AUTO: 10.2 X10*3/UL (ref 4.4–11.3)
WBC #/AREA URNS AUTO: ABNORMAL /HPF

## 2024-06-23 PROCEDURE — 87086 URINE CULTURE/COLONY COUNT: CPT | Mod: AHULAB | Performed by: MIDWIFE

## 2024-06-23 PROCEDURE — 83615 LACTATE (LD) (LDH) ENZYME: CPT | Performed by: MIDWIFE

## 2024-06-23 PROCEDURE — 59025 FETAL NON-STRESS TEST: CPT | Performed by: MIDWIFE

## 2024-06-23 PROCEDURE — 2500000004 HC RX 250 GENERAL PHARMACY W/ HCPCS (ALT 636 FOR OP/ED): Performed by: MIDWIFE

## 2024-06-23 PROCEDURE — 99215 OFFICE O/P EST HI 40 MIN: CPT

## 2024-06-23 PROCEDURE — 99213 OFFICE O/P EST LOW 20 MIN: CPT | Performed by: MIDWIFE

## 2024-06-23 PROCEDURE — 2500000002 HC RX 250 W HCPCS SELF ADMINISTERED DRUGS (ALT 637 FOR MEDICARE OP, ALT 636 FOR OP/ED): Performed by: MIDWIFE

## 2024-06-23 PROCEDURE — 84075 ASSAY ALKALINE PHOSPHATASE: CPT | Performed by: MIDWIFE

## 2024-06-23 PROCEDURE — 36415 COLL VENOUS BLD VENIPUNCTURE: CPT | Mod: 59 | Performed by: MIDWIFE

## 2024-06-23 PROCEDURE — 82570 ASSAY OF URINE CREATININE: CPT | Performed by: MIDWIFE

## 2024-06-23 PROCEDURE — 81003 URINALYSIS AUTO W/O SCOPE: CPT | Performed by: MIDWIFE

## 2024-06-23 PROCEDURE — 36415 COLL VENOUS BLD VENIPUNCTURE: CPT | Mod: 59

## 2024-06-23 PROCEDURE — 85027 COMPLETE CBC AUTOMATED: CPT | Performed by: MIDWIFE

## 2024-06-23 RX ORDER — NITROFURANTOIN 25; 75 MG/1; MG/1
100 CAPSULE ORAL EVERY 12 HOURS SCHEDULED
Status: DISCONTINUED | OUTPATIENT
Start: 2024-06-23 | End: 2024-06-23 | Stop reason: HOSPADM

## 2024-06-23 RX ORDER — NITROFURANTOIN 25; 75 MG/1; MG/1
100 CAPSULE ORAL 2 TIMES DAILY
Qty: 9 CAPSULE | Refills: 0 | Status: SHIPPED | OUTPATIENT
Start: 2024-06-23 | End: 2024-06-28

## 2024-06-23 RX ORDER — METOCLOPRAMIDE HYDROCHLORIDE 5 MG/ML
10 INJECTION INTRAMUSCULAR; INTRAVENOUS EVERY 6 HOURS PRN
Status: CANCELLED | OUTPATIENT
Start: 2024-06-23

## 2024-06-23 RX ORDER — METOCLOPRAMIDE HYDROCHLORIDE 5 MG/ML
10 INJECTION INTRAMUSCULAR; INTRAVENOUS EVERY 6 HOURS PRN
Status: DISCONTINUED | OUTPATIENT
Start: 2024-06-23 | End: 2024-06-23 | Stop reason: HOSPADM

## 2024-06-23 RX ORDER — METOCLOPRAMIDE 10 MG/1
10 TABLET ORAL EVERY 6 HOURS PRN
Status: DISCONTINUED | OUTPATIENT
Start: 2024-06-23 | End: 2024-06-23 | Stop reason: HOSPADM

## 2024-06-23 RX ORDER — METOCLOPRAMIDE 10 MG/1
10 TABLET ORAL EVERY 6 HOURS PRN
Status: CANCELLED | OUTPATIENT
Start: 2024-06-23

## 2024-06-23 RX ADMIN — NITROFURANTOIN MONOHYDRATE/MACROCRYSTALS 100 MG: 75; 25 CAPSULE ORAL at 18:34

## 2024-06-23 RX ADMIN — METOCLOPRAMIDE 10 MG: 5 INJECTION, SOLUTION INTRAMUSCULAR; INTRAVENOUS at 17:37

## 2024-06-23 SDOH — SOCIAL STABILITY: SOCIAL INSECURITY: ARE THERE ANY APPARENT SIGNS OF INJURIES/BEHAVIORS THAT COULD BE RELATED TO ABUSE/NEGLECT?: NO

## 2024-06-23 SDOH — SOCIAL STABILITY: SOCIAL INSECURITY: PHYSICAL ABUSE: DENIES

## 2024-06-23 SDOH — HEALTH STABILITY: MENTAL HEALTH: NON-SPECIFIC ACTIVE SUICIDAL THOUGHTS (PAST 1 MONTH): NO

## 2024-06-23 SDOH — SOCIAL STABILITY: SOCIAL INSECURITY: HAVE YOU HAD ANY THOUGHTS OF HARMING ANYONE ELSE?: NO

## 2024-06-23 SDOH — HEALTH STABILITY: MENTAL HEALTH: WISH TO BE DEAD (PAST 1 MONTH): NO

## 2024-06-23 SDOH — ECONOMIC STABILITY: HOUSING INSECURITY: DO YOU FEEL UNSAFE GOING BACK TO THE PLACE WHERE YOU ARE LIVING?: NO

## 2024-06-23 SDOH — HEALTH STABILITY: MENTAL HEALTH: SUICIDAL BEHAVIOR (LIFETIME): NO

## 2024-06-23 SDOH — HEALTH STABILITY: MENTAL HEALTH: HAVE YOU USED ANY SUBSTANCES (CANABIS, COCAINE, HEROIN, HALLUCINOGENS, INHALANTS, ETC.) IN THE PAST 12 MONTHS?: NO

## 2024-06-23 SDOH — SOCIAL STABILITY: SOCIAL INSECURITY: DO YOU FEEL ANYONE HAS EXPLOITED OR TAKEN ADVANTAGE OF YOU FINANCIALLY OR OF YOUR PERSONAL PROPERTY?: NO

## 2024-06-23 SDOH — SOCIAL STABILITY: SOCIAL INSECURITY: ABUSE SCREEN: ADULT

## 2024-06-23 SDOH — SOCIAL STABILITY: SOCIAL INSECURITY: HAVE YOU HAD THOUGHTS OF HARMING ANYONE ELSE?: NO

## 2024-06-23 SDOH — SOCIAL STABILITY: SOCIAL INSECURITY: HAS ANYONE EVER THREATENED TO HURT YOUR FAMILY OR YOUR PETS?: NO

## 2024-06-23 SDOH — HEALTH STABILITY: MENTAL HEALTH: HAVE YOU USED ANY PRESCRIPTION DRUGS OTHER THAN PRESCRIBED IN THE PAST 12 MONTHS?: NO

## 2024-06-23 SDOH — HEALTH STABILITY: MENTAL HEALTH: WERE YOU ABLE TO COMPLETE ALL THE BEHAVIORAL HEALTH SCREENINGS?: YES

## 2024-06-23 SDOH — SOCIAL STABILITY: SOCIAL INSECURITY: DOES ANYONE TRY TO KEEP YOU FROM HAVING/CONTACTING OTHER FRIENDS OR DOING THINGS OUTSIDE YOUR HOME?: NO

## 2024-06-23 SDOH — SOCIAL STABILITY: SOCIAL INSECURITY: ARE YOU OR HAVE YOU BEEN THREATENED OR ABUSED PHYSICALLY, EMOTIONALLY, OR SEXUALLY BY ANYONE?: NO

## 2024-06-23 SDOH — SOCIAL STABILITY: SOCIAL INSECURITY: VERBAL ABUSE: DENIES

## 2024-06-23 ASSESSMENT — PAIN SCALES - GENERAL: PAINLEVEL_OUTOF10: 6

## 2024-06-23 ASSESSMENT — LIFESTYLE VARIABLES
AUDIT-C TOTAL SCORE: 0
SKIP TO QUESTIONS 9-10: 1
HOW OFTEN DO YOU HAVE A DRINK CONTAINING ALCOHOL: NEVER
HOW MANY STANDARD DRINKS CONTAINING ALCOHOL DO YOU HAVE ON A TYPICAL DAY: PATIENT DOES NOT DRINK
HOW OFTEN DO YOU HAVE 6 OR MORE DRINKS ON ONE OCCASION: NEVER
AUDIT-C TOTAL SCORE: 0

## 2024-06-23 ASSESSMENT — PATIENT HEALTH QUESTIONNAIRE - PHQ9
1. LITTLE INTEREST OR PLEASURE IN DOING THINGS: NOT AT ALL
2. FEELING DOWN, DEPRESSED OR HOPELESS: NOT AT ALL
SUM OF ALL RESPONSES TO PHQ9 QUESTIONS 1 & 2: 0

## 2024-06-23 NOTE — DISCHARGE SUMMARY
Discharge Summary    Admission Date: 6/23/2024  Discharge Date: 6/23/2024    Discharge Diagnosis    Migraine affecting third trimester of pregnancy     Hospital Course  Reassuring labs and VS reviewed with pt; she no longer has blurred vision but still has a headache. As we were discussing migraines and neurology consult, she noted that following her neurology consult she was prescribed something after her appointment but has never taken the medication because it was not discussed during her appointment. She was encouraged to call her neurology provider tomorrow to discuss medication questions so that she could become more comfortable taking the prescribed medication and have better control of symptoms. We also reviewed migraine interventions to attempt overnight tonight until she can talk to neurology as well as keeping her next routine OB appointment. We discussed PEC vs migraines and how it can be difficult to discriminate and that she should always call and report new/worsening/or unrelieved symptoms because repeating labs may be indicated. We also discussed her new onset urinary complaints and the bacteria in her urine; starting an antibiotic was recommended to her and she was agreeable to this plan. Their questions were answered to their satisfaction and pt verbalized understanding to all topics discussed today.        Procedures: none      Pertinent Physical Exam At Time of Discharge  GENERAL: Examination reveals a well developed, well nourished, gravid female in no acute distress. She is alert and cooperative.  LUNGS:  easy respiratory effort  FHR is 140 , with  moderate variability and Accelerations; no decels or sinusoidal patterns. Reactive NST.   [place] reading:  Uterine irritability without contractions, soft uterine resting tone   PSYCHOLOGICAL: awake and alert; oriented to person, place, and time      Discharge Meds     Your medication list        ASK your doctor about these medications         Instructions Last Dose Given Next Dose Due   acetaminophen 500 mg tablet  Commonly known as: Tylenol Extra Strength      Take 2 tablets (1,000 mg) by mouth every 6 hours if needed for mild pain (1 - 3).       lidocaine 5 % patch  Commonly known as: Lidoderm  Ask about: Should I take this medication?      Place 1 patch over 12 hours on the skin once daily for 7 days. Remove & discard patch within 12 hours or as directed by MD.       magnesium 200 mg tablet      Take 1 tablet (200 mg) by mouth 2 times a day.       prenatal vitamin (iron-folic) 27 mg iron-800 mcg folic acid tablet      Take 1 tablet by mouth once daily.       Vitamin B-6 25 mg tablet  Generic drug: pyridoxine      TAKE 1 TABLET BY MOUTH EVERY 8 HOURS                 Complications Requiring Follow-Up  Pending urine C&S   Follow up with neurology for migraine control     Test Results Pending At Discharge  Pending Labs       Order Current Status    Extra Urine Gray Tube In process    Urinalysis with Reflex Culture and Microscopic In process    Urine Culture In process            Outpatient Follow-Up  Future Appointments   Date Time Provider Department Center   6/24/2024  1:45 PM NELL Waters, APRN-CNP [id number] [facility]   7/1/2024  1:45 PM KUMAR Waters-EDDIE, APRN-CNP [id number] [facility]   7/8/2024 11:00 AM Anitra Arango, PT [id number] [facility]   7/8/2024  1:45 PM NELL Waters, APRN-CNP [id number] [facility]   7/15/2024  1:45 PM NELL Waters, APRN-CNP [id number] spent 25 minutes in the professional and overall care of this patient.      Elham Gasca, NELL

## 2024-06-23 NOTE — H&P
Obstetrical Triage Visit     Babs Brown is a 24 y.o. . HA, blurred vision    Chief Complaint: Headache (Blurred vision)    Assessment/Plan      Pregnancy c/b:   -h/o LTCS, goal for TOLAC   -migraines in pregnancy, s/p neuro referral   -anemia, s/p iron infusion   -thickened NT, no other soft markers   -Rubella non-immune   -varicella non-immune   -FH spina bifida, uncle with surgical     A:   -headache, vision changes affecting multigravida at 36.6wga   -Reactive NST     P:  -HDP labs   -reglan for HA  -continuous EFM   -dispo TBD     PURNIMA Gasca APRN-CNM     Active Problems:  There are no active Hospital Problems.      Pregnancy Problems (from 23 to present)       Problem Noted Resolved    Anemia affecting pregnancy in second trimester ([facility]) 3/4/2024 by Luisa Farah APRN-CNM, APRN-CNP No    Priority:  Medium      Overview Signed 3/4/2024 12:00 PM by KUMAR Waters-CNARNIE, APRN-CNP     Start Iron supplement second trimester   25 week check retic count with CBC         Multigravida in second trimester ([facility]) 2024 by Luisa Farah APRN-CNM, APRN-CNP No    Priority:  Medium      Suspected fetal anomaly, antepartum ([facility]) 2024 by Germania Mejia MD No    Priority:  Medium      Rubella non-immune status, antepartum ([facility]) 2023 by Luisa Farah APRN-CNM, APRN-CNP No    Priority:  Medium      Overview Signed 2024  5:43 PM by Luisa Farah APRN-CNM, APRN-CNP     Vaccinate PP         Maternal varicella, non-immune ([facility]) 2023 by Luisa Farah APRN-CNM, APRN-CNP No    Priority:  Medium      Overview Signed 2024  5:43 PM by Luisa Farah APRN-CNM, APRN-CNP     Vaccinate PP         Obesity in pregnancy ([facility]) 2023 by Luisa Farah APRN-CNM, APRN-CNP No    Priority:  Medium            Subjective    presents with her  to [facility] L&D triage for c/o headache and blurred vision. She notes some blurred  vision yesterday intermittently and then again today with the onset of headache. She took Tylenol around 1300 and has not had relief. She is concerned for preeclampsia. She endorses regular fetal movement and denies abdominal pain, vaginal LOF or bleeding. She has had ongoing leaking of urine but has noticed increased urgency and will often feel like she has to void but then is unable to urinate. She has also noticed increased SOB, though it sounds like it is related to her position.          Obstetrical History   OB History    Para Term  AB Living   3 1 1   1 1   SAB IAB Ectopic Multiple Live Births   1              # Outcome Date GA Lbr Natan/2nd Weight Sex Delivery Anes PTL Lv   3 Current            2 Term 23    M CS-LTranv Gen N       Complications: Fetal Intolerance   1 SAB 2022               Past Medical History  Past Medical History:   Diagnosis Date    ADHD     Anxiety     Depression     Encounter for  screening for malformations ([facility]) 2022    Encounter for  screening for malformations    Migraine         Past Surgical History   Past Surgical History:   Procedure Laterality Date     SECTION, LOW TRANSVERSE      OTHER SURGICAL HISTORY  2022    Foot surgery    OTHER SURGICAL HISTORY  2022    Dilation and curettage       Social History  Social History     Tobacco Use    Smoking status: Never    Smokeless tobacco: Never   Substance Use Topics    Alcohol use: Not Currently     Substance and Sexual Activity   Drug Use Never       Allergies  Lactose and Topamax [topiramate]     Medications  Medications Prior to Admission   Medication Sig Dispense Refill Last Dose    acetaminophen (Tylenol Extra Strength) 500 mg tablet Take 2 tablets (1,000 mg) by mouth every 6 hours if needed for mild pain (1 - 3). 30 tablet 0     [] lidocaine (Lidoderm) 5 % patch Place 1 patch over 12 hours on the skin once daily for 7 days. Remove & discard patch within  12 hours or as directed by MD. 7 patch 0     magnesium 200 mg tablet Take 1 tablet (200 mg) by mouth 2 times a day. 60 tablet 11     prenatal vitamin, iron-folic, (prenatal vit no.130-iron-folic) 27 mg iron-800 mcg folic acid tablet Take 1 tablet by mouth once daily. 30 tablet 11     Vitamin B-6 25 mg tablet TAKE 1 TABLET BY MOUTH EVERY 8 HOURS 270 tablet 1        Objective    Last Vitals  Temp Pulse Resp BP MAP O2 Sat                   Physical Examination  GENERAL: Examination reveals a well developed, well nourished, gravid female in no acute distress. She is alert and cooperative.  HEENT: PERRLA. External ears normal. Nose normal, no erythema or discharge. Mouth and throat clear.  NECK: supple, no significant adenopathy  LUNGS: clear to auscultation bilaterally  HEART: regular rate and rhythm, S1, S2 normal, no murmur, click, rub or gallop  BREASTS: breasts appear normal for pregnancy, no suspicious masses, no skin or nipple changes or axillary nodes  ABDOMEN: soft, gravid, nontender, nondistended, no abnormal masses, no epigastric pain  FHR is 150 with moderate variability and accels; no decels or sinusoidal patterns. Reactive NST.  [place] reading:  Potential for mild contraction x1 x 30 seconds, mild contractile intensity and soft uterine resting tone   VAGINA: normal appearing vagina with normal color and discharge and no lesions noted  CERVIX: not evaluated; MEMBRANES are  intact  EXTREMITIES: no redness or tenderness in the calves or thighs, edema trace BLE+  SKIN: normal coloration and turgor, no rashes  NEUROLOGICAL: alert, oriented, normal speech, no focal findings or movement disorder noted  PSYCHOLOGICAL: awake and alert; oriented to person, place, and time    Lab Review  Labs in chart were reviewed.

## 2024-06-24 ENCOUNTER — APPOINTMENT (OUTPATIENT)
Dept: OBSTETRICS AND GYNECOLOGY | Facility: CLINIC | Age: 24
End: 2024-06-24
Payer: COMMERCIAL

## 2024-06-24 VITALS — SYSTOLIC BLOOD PRESSURE: 100 MMHG | WEIGHT: 212 LBS | BODY MASS INDEX: 36.39 KG/M2 | DIASTOLIC BLOOD PRESSURE: 70 MMHG

## 2024-06-24 DIAGNOSIS — G43.801 OTHER MIGRAINE WITH STATUS MIGRAINOSUS, NOT INTRACTABLE: ICD-10-CM

## 2024-06-24 DIAGNOSIS — Z3A.37 37 WEEKS GESTATION OF PREGNANCY (HHS-HCC): ICD-10-CM

## 2024-06-24 DIAGNOSIS — Z28.39 RUBELLA NON-IMMUNE STATUS, ANTEPARTUM (HHS-HCC): ICD-10-CM

## 2024-06-24 DIAGNOSIS — O99.012 ANEMIA AFFECTING PREGNANCY IN SECOND TRIMESTER (HHS-HCC): ICD-10-CM

## 2024-06-24 DIAGNOSIS — O09.899 RUBELLA NON-IMMUNE STATUS, ANTEPARTUM (HHS-HCC): ICD-10-CM

## 2024-06-24 DIAGNOSIS — O28.3 NUCHAL FOLD THICKENING ON PRENATAL ULTRASOUND: ICD-10-CM

## 2024-06-24 DIAGNOSIS — O34.219 UTERINE SCAR FROM PREVIOUS CESAREAN DELIVERY AFFECTING PREGNANCY (HHS-HCC): Primary | ICD-10-CM

## 2024-06-24 DIAGNOSIS — Z98.891 H/O CESAREAN SECTION: ICD-10-CM

## 2024-06-24 LAB — HOLD SPECIMEN: NORMAL

## 2024-06-24 PROCEDURE — 99213 OFFICE O/P EST LOW 20 MIN: CPT | Performed by: NURSE PRACTITIONER

## 2024-06-25 LAB — BACTERIA UR CULT: NORMAL

## 2024-06-29 PROBLEM — G43.901 MIGRAINE WITH STATUS MIGRAINOSUS, NOT INTRACTABLE: Status: ACTIVE | Noted: 2024-06-29

## 2024-06-29 PROBLEM — O34.219 UTERINE SCAR FROM PREVIOUS CESAREAN DELIVERY AFFECTING PREGNANCY (HHS-HCC): Status: ACTIVE | Noted: 2024-06-29

## 2024-06-29 PROBLEM — O99.013 ANEMIA AFFECTING PREGNANCY IN THIRD TRIMESTER (HHS-HCC): Status: ACTIVE | Noted: 2024-03-04

## 2024-06-29 NOTE — PROGRESS NOTES
"Subjective   Patient ID [id number]   Babs Brown is a 24 y.o.  at 38w0d with a working estimated date of delivery of 7/15/2024, by Last Menstrual Period who presents for a routine prenatal visit. She endorses regular fetal movement and denies HA, blurred vision, chest or RUQ pain, abdominal or urinary discomfort, vaginal bleeding or LOF, or edema.     Occasional contractions that come and go now, nothing regular today.     Her pregnancy is complicated by:  -h/o LTCS, desires TOLAC     We discussed comfort measures for sciatica and she was encouraged to discontinue heat application. We reviewed remainder of pregnancy care, follow up anemia labs, and GBS next visit. She would like to complete labs next visit.     Objective   Physical Exam: NAD, easy respiratory effort, CN grossly intact, no edema; alert & oriented @ baseline    Weight: 97.2 kg (214 lb 3.2 oz)  Expected Total Weight Gain: Could not be calculated   Pregravid BMI: Could not be calculated  BP: 90/68  Fetal Heart Rate: 140 Fundal Height (cm): 39 cm Presentation: Vertex           Prenatal Labs  Urine Dip:  Lab Results   Component Value Date    KETONESU NEGATIVE 2024    GLUCOSEUR NEGATIVE 2024     Lab Results   Component Value Date    HGB 11.9 (L) 2024    HCT 35.3 (L) 2024    ABO B 2024    HEPBSAG Nonreactive 2023     No results found for: \"PAPPA\", \"AFP\", \"HCG\", \"ESTRIOL\", \"INHBA\"  No results found for: \"GLUF\", \"GLUT1\", \"[id number]\", \"[id number]\"    Imaging  See imaging reports.           Babs was seen today for routine prenatal visit.  Diagnoses and all orders for this visit:  Other migraine with status migrainosus, not intractable  Nuchal fold thickening on prenatal ultrasound  Rubella non-immune status, antepartum ([facility])  Anemia affecting pregnancy in third trimester ([facility])  Uterine scar from previous  delivery affecting pregnancy ([facility])  Obesity in pregnancy ([facility])  38 weeks gestation of " pregnancy ([facility])  -     POCT urinalysis dipstick manually resulted       Continue prenatal vitamin.  GBS negative  Expected mode of delivery TOLAC  Follow up in 1 weeks for a routine prenatal visit.      Luisa Farah, APRN-CNM, APRN-CNP

## 2024-07-01 ENCOUNTER — APPOINTMENT (OUTPATIENT)
Dept: OBSTETRICS AND GYNECOLOGY | Facility: CLINIC | Age: 24
End: 2024-07-01
Payer: COMMERCIAL

## 2024-07-01 VITALS — DIASTOLIC BLOOD PRESSURE: 68 MMHG | BODY MASS INDEX: 36.77 KG/M2 | WEIGHT: 214.2 LBS | SYSTOLIC BLOOD PRESSURE: 90 MMHG

## 2024-07-01 DIAGNOSIS — O99.210 OBESITY IN PREGNANCY (HHS-HCC): ICD-10-CM

## 2024-07-01 DIAGNOSIS — Z3A.38 38 WEEKS GESTATION OF PREGNANCY (HHS-HCC): ICD-10-CM

## 2024-07-01 DIAGNOSIS — G43.801 OTHER MIGRAINE WITH STATUS MIGRAINOSUS, NOT INTRACTABLE: ICD-10-CM

## 2024-07-01 DIAGNOSIS — O34.219 UTERINE SCAR FROM PREVIOUS CESAREAN DELIVERY AFFECTING PREGNANCY (HHS-HCC): ICD-10-CM

## 2024-07-01 DIAGNOSIS — O09.899 RUBELLA NON-IMMUNE STATUS, ANTEPARTUM (HHS-HCC): ICD-10-CM

## 2024-07-01 DIAGNOSIS — O28.3 NUCHAL FOLD THICKENING ON PRENATAL ULTRASOUND: ICD-10-CM

## 2024-07-01 DIAGNOSIS — Z28.39 RUBELLA NON-IMMUNE STATUS, ANTEPARTUM (HHS-HCC): ICD-10-CM

## 2024-07-01 DIAGNOSIS — O99.013 ANEMIA AFFECTING PREGNANCY IN THIRD TRIMESTER (HHS-HCC): ICD-10-CM

## 2024-07-01 LAB
GLUCOSE URINE, POC: NEGATIVE
URINE PROTEIN, POC: NORMAL

## 2024-07-01 PROCEDURE — 99213 OFFICE O/P EST LOW 20 MIN: CPT | Performed by: NURSE PRACTITIONER

## 2024-07-01 NOTE — PROGRESS NOTES
Spoke w/ pt over the telephone, she is starting to have increased BH contractions, occasional mild cramping, and lower back pain. She wanted to review s/s labor and when to come in. She endorses regular fetal movement and denies HA, blurred vision, chest or RUQ pain, regular contractions, urinary complaints, vaginal LOF or bleeding. She was reassured that she may come in anytime for a labor check and should come in with any new/worsening concerns. PURNIMA CAMEJO

## 2024-07-08 ENCOUNTER — APPOINTMENT (OUTPATIENT)
Dept: OBSTETRICS AND GYNECOLOGY | Facility: CLINIC | Age: 24
End: 2024-07-08
Payer: COMMERCIAL

## 2024-07-08 ENCOUNTER — DOCUMENTATION (OUTPATIENT)
Dept: PHYSICAL THERAPY | Facility: HOSPITAL | Age: 24
End: 2024-07-08

## 2024-07-08 VITALS — BODY MASS INDEX: 37.25 KG/M2 | WEIGHT: 217 LBS | DIASTOLIC BLOOD PRESSURE: 80 MMHG | SYSTOLIC BLOOD PRESSURE: 110 MMHG

## 2024-07-08 DIAGNOSIS — O09.899 RUBELLA NON-IMMUNE STATUS, ANTEPARTUM (HHS-HCC): ICD-10-CM

## 2024-07-08 DIAGNOSIS — N93.9 ABNORMAL UTERINE BLEEDING (AUB): ICD-10-CM

## 2024-07-08 DIAGNOSIS — O99.013 ANEMIA AFFECTING PREGNANCY IN THIRD TRIMESTER (HHS-HCC): ICD-10-CM

## 2024-07-08 DIAGNOSIS — Z28.39 RUBELLA NON-IMMUNE STATUS, ANTEPARTUM (HHS-HCC): ICD-10-CM

## 2024-07-08 DIAGNOSIS — O34.219 UTERINE SCAR FROM PREVIOUS CESAREAN DELIVERY AFFECTING PREGNANCY (HHS-HCC): ICD-10-CM

## 2024-07-08 DIAGNOSIS — Z3A.39 39 WEEKS GESTATION OF PREGNANCY (HHS-HCC): Primary | ICD-10-CM

## 2024-07-08 DIAGNOSIS — G43.801 OTHER MIGRAINE WITH STATUS MIGRAINOSUS, NOT INTRACTABLE: ICD-10-CM

## 2024-07-08 PROCEDURE — 99213 OFFICE O/P EST LOW 20 MIN: CPT | Performed by: NURSE PRACTITIONER

## 2024-07-08 NOTE — PROGRESS NOTES
Physical Therapy                      Therapy Communication Note    Patient Name: Babs Brown  MRN: [id number]  Today's Date: 7/8/2024     Discipline: Physical Therapy    Missed Time: No Show    Missed Visit Reason:  Pt no showed for her pelvic floor initial evaluation

## 2024-07-08 NOTE — PROGRESS NOTES
"Subjective   Patient ID [id number]   Babs Brown is a 24 y.o.  at 39w0d with a working estimated date of delivery of 7/15/2024, by Last Menstrual Period who presents for a routine prenatal visit. She endorses regular fetal movement and denies HA, blurred vision, chest or RUQ pain, abdominal or urinary discomfort, vaginal bleeding or LOF, or edema.     Occasional contractions that come and go now, nothing regular today.     Her pregnancy is complicated by:  -h/o LTCS, desires TOLAC     We discussed comfort measures for sciatica and she was encouraged to discontinue heat application. We reviewed remainder of pregnancy care, follow up anemia labs, and GBS next visit. She would like to complete labs next visit.     Objective   Physical Exam: NAD, easy respiratory effort, CN grossly intact, no edema; alert & oriented @ baseline    Weight: 98.4 kg (217 lb)  Expected Total Weight Gain: Could not be calculated   Pregravid BMI: Could not be calculated  BP: 110/80  Fetal Heart Rate: 137 Fundal Height (cm): 38 cm Presentation: Vertex           Prenatal Labs  Urine Dip:  Lab Results   Component Value Date    KETONESU NEGATIVE 2024    GLUCOSEUR NEGATIVE 2024     Lab Results   Component Value Date    HGB 11.9 (L) 2024    HCT 35.3 (L) 2024    ABO B 2024    HEPBSAG Nonreactive 2023     No results found for: \"PAPPA\", \"AFP\", \"HCG\", \"ESTRIOL\", \"INHBA\"  No results found for: \"GLUF\", \"GLUT1\", \"OCIFSQE1OU\", \"FNCLEVB4UZ\"    Imaging  See imaging reports.           Babs was seen today for routine prenatal visit.  Diagnoses and all orders for this visit:  39 weeks gestation of pregnancy ([facility]) (Primary)  Rubella non-immune status, antepartum ([facility])  Anemia affecting pregnancy in third trimester ([facility])  Other migraine with status migrainosus, not intractable  Uterine scar from previous  delivery affecting pregnancy ([facility])  Abnormal uterine bleeding (AUB)         Continue " prenatal vitamin.  GBS negative  Expected mode of delivery TOLAC  Follow up in 1 weeks for a routine prenatal visit.      Luisa Farah, APRN-CNM, APRN-CNP

## 2024-07-09 ENCOUNTER — HOSPITAL ENCOUNTER (OUTPATIENT)
Facility: HOSPITAL | Age: 24
Discharge: HOME | End: 2024-07-09
Attending: OBSTETRICS & GYNECOLOGY | Admitting: OBSTETRICS & GYNECOLOGY
Payer: COMMERCIAL

## 2024-07-09 ENCOUNTER — TELEPHONE (OUTPATIENT)
Dept: OBSTETRICS AND GYNECOLOGY | Facility: CLINIC | Age: 24
End: 2024-07-09
Payer: COMMERCIAL

## 2024-07-09 VITALS
BODY MASS INDEX: 36.58 KG/M2 | SYSTOLIC BLOOD PRESSURE: 136 MMHG | HEIGHT: 64 IN | DIASTOLIC BLOOD PRESSURE: 83 MMHG | RESPIRATION RATE: 16 BRPM | TEMPERATURE: 96.8 F | HEART RATE: 96 BPM | OXYGEN SATURATION: 98 % | WEIGHT: 214.29 LBS

## 2024-07-09 PROCEDURE — 99213 OFFICE O/P EST LOW 20 MIN: CPT | Performed by: ADVANCED PRACTICE MIDWIFE

## 2024-07-09 PROCEDURE — 99213 OFFICE O/P EST LOW 20 MIN: CPT | Mod: 25

## 2024-07-09 PROCEDURE — 59025 FETAL NON-STRESS TEST: CPT | Performed by: ADVANCED PRACTICE MIDWIFE

## 2024-07-09 RX ORDER — HYDRALAZINE HYDROCHLORIDE 20 MG/ML
5 INJECTION INTRAMUSCULAR; INTRAVENOUS ONCE AS NEEDED
Status: DISCONTINUED | OUTPATIENT
Start: 2024-07-09 | End: 2024-07-09 | Stop reason: HOSPADM

## 2024-07-09 RX ORDER — ONDANSETRON 4 MG/1
4 TABLET, FILM COATED ORAL EVERY 6 HOURS PRN
Status: DISCONTINUED | OUTPATIENT
Start: 2024-07-09 | End: 2024-07-09 | Stop reason: HOSPADM

## 2024-07-09 RX ORDER — ONDANSETRON HYDROCHLORIDE 2 MG/ML
4 INJECTION, SOLUTION INTRAVENOUS EVERY 6 HOURS PRN
Status: DISCONTINUED | OUTPATIENT
Start: 2024-07-09 | End: 2024-07-09 | Stop reason: HOSPADM

## 2024-07-09 RX ORDER — LIDOCAINE HYDROCHLORIDE 10 MG/ML
0.5 INJECTION INFILTRATION; PERINEURAL ONCE AS NEEDED
Status: DISCONTINUED | OUTPATIENT
Start: 2024-07-09 | End: 2024-07-09 | Stop reason: HOSPADM

## 2024-07-09 RX ORDER — LABETALOL HYDROCHLORIDE 5 MG/ML
20 INJECTION, SOLUTION INTRAVENOUS ONCE AS NEEDED
Status: DISCONTINUED | OUTPATIENT
Start: 2024-07-09 | End: 2024-07-09 | Stop reason: HOSPADM

## 2024-07-09 RX ORDER — NIFEDIPINE 10 MG/1
10 CAPSULE ORAL ONCE AS NEEDED
Status: DISCONTINUED | OUTPATIENT
Start: 2024-07-09 | End: 2024-07-09 | Stop reason: HOSPADM

## 2024-07-09 ASSESSMENT — PAIN SCALES - GENERAL: PAINLEVEL_OUTOF10: 10 - WORST POSSIBLE PAIN

## 2024-07-09 NOTE — H&P
Obstetrical Admission History and Physical     Babs VANI Brown is a 24 y.o. .     Chief Complaint: Pelvic Pain    Assessment/Plan    Pelvic pain/symphysis pubis separation: Discussed relief measures including pelvic support belt, heat therapy,Flexeril, and IOL. Pt declines IOL and decline script for medication.   : Pt declines RCD at this time as well. Would like to await spontaneous onset of labor. R/B/A of both options discussed at length given current level of discomfort.   Pt declines in intervention at this time. Will keep next routine PNV. Discussed warning s/s to report along with when/how to call. Pt expressed proper verbal understanding.     Active Problems:  There are no active Hospital Problems.      Pregnancy Problems (from 22 to present)       Problem Noted Resolved    Anemia affecting pregnancy in third trimester ([facility]) 3/4/2024 by NELL Waters, APRN-CNP No    Priority:  Medium      Overview Signed 3/4/2024 12:00 PM by NELL Waters, APRN-CNP     Start Iron supplement second trimester   25 week check retic count with CBC         Multigravida in second trimester ([facility]) 2024 by NELL Waters, APRN-CNP No    Priority:  Medium      Suspected fetal anomaly, antepartum ([facility]) 2024 by Germania Mejia MD No    Priority:  Medium      Rubella non-immune status, antepartum ([facility]) 2023 by NELL Waters, APRN-CNP No    Priority:  Medium      Overview Signed 2024  5:43 PM by NELL Waters, APRN-CNP     Vaccinate PP         Maternal varicella, non-immune ([facility]) 2023 by NELL Waters, APRN-CNP No    Priority:  Medium      Overview Signed 2024  5:43 PM by NELL Waters, APRN-CNP     Vaccinate PP         Obesity in pregnancy ([facility]) 2023 by NELL Waters, APRN-CNP No    Priority:  Medium               Obstetrical History   OB  History    Para Term  AB Living   3 1 1   1 1   SAB IAB Ectopic Multiple Live Births   1              # Outcome Date GA Lbr Natan/2nd Weight Sex Delivery Anes PTL Lv   3 Current            2 Term 23    M CS-LTranv Gen N       Complications: Fetal Intolerance   1 SAB 2022               Past Medical History  Past Medical History:   Diagnosis Date    ADHD     Anxiety     Depression     Encounter for  screening for malformations ([facility]) 2022    Encounter for  screening for malformations    Migraine         Past Surgical History   Past Surgical History:   Procedure Laterality Date     SECTION, LOW TRANSVERSE      OTHER SURGICAL HISTORY  2022    Foot surgery    OTHER SURGICAL HISTORY  2022    Dilation and curettage       Social History  Social History     Tobacco Use    Smoking status: Never    Smokeless tobacco: Never   Substance Use Topics    Alcohol use: Not Currently     Substance and Sexual Activity   Drug Use Never       Allergies  Lactose and Topamax [topiramate]     Medications  No medications prior to admission.       Objective    Last Vitals  Temp Pulse Resp BP MAP O2 Sat   36 °C (96.8 °F) 96 16 136/83   98 %     Physical Examination  GENERAL: Examination reveals a well developed, well nourished, gravid female whose affect is appropriate. She is alert and cooperative.  ABDOMEN: soft, gravid, nontender, nondistended, no abnormal masses, no epigastric pain, tender at symphysis pubis   FHR is Cat 1, with FHR bL of 145, mod variability, acels, one mild isolated episodic variable decel noted. Otherwise Cat 1    [place] reading: Irregular/occasional contractions   The fetus is in a vertex presentation, determined by vaginal exam  VAGINA: normal appearing vagina with normal color and discharge and no lesions noted  CERVIX: 3 cm dilated, 70 % effaced, -2 station; MEMBRANES are Intact  EXTREMITIES: no redness or tenderness in the calves or thighs, edema  1+  SKIN: normal coloration and turgor, no rashes  NEUROLOGICAL: DTRs normal and symmetrical  PSYCHOLOGICAL: awake and alert; oriented to person, place, and time    Lab Review  Labs in chart were reviewed.    KUMAR Chairez-EDDIE

## 2024-07-09 NOTE — TELEPHONE ENCOUNTER
Patient's  calling stating Babs didn't want to talk on the phone (I could hear her in the background) they stated they had some questions over her pregnancy/labor and if you could call him back at [phone]. Thank you!

## 2024-07-14 ENCOUNTER — ANESTHESIA EVENT (OUTPATIENT)
Dept: OBSTETRICS AND GYNECOLOGY | Facility: HOSPITAL | Age: 24
End: 2024-07-14
Payer: COMMERCIAL

## 2024-07-14 ENCOUNTER — ANESTHESIA (OUTPATIENT)
Dept: OBSTETRICS AND GYNECOLOGY | Facility: HOSPITAL | Age: 24
End: 2024-07-14
Payer: COMMERCIAL

## 2024-07-14 ENCOUNTER — HOSPITAL ENCOUNTER (INPATIENT)
Facility: HOSPITAL | Age: 24
LOS: 5 days | Discharge: HOME | End: 2024-07-19
Attending: OBSTETRICS & GYNECOLOGY | Admitting: MIDWIFE
Payer: COMMERCIAL

## 2024-07-14 DIAGNOSIS — G89.18 POSTOPERATIVE PAIN: Primary | ICD-10-CM

## 2024-07-14 PROBLEM — Z34.82 MULTIGRAVIDA IN SECOND TRIMESTER (HHS-HCC): Status: RESOLVED | Noted: 2024-02-05 | Resolved: 2024-07-14

## 2024-07-14 PROBLEM — O34.219 PREVIOUS CESAREAN DELIVERY AFFECTING PREGNANCY (HHS-HCC): Status: ACTIVE | Noted: 2024-07-14

## 2024-07-14 PROCEDURE — 1120000001 HC OB PRIVATE ROOM DAILY

## 2024-07-14 PROCEDURE — 86901 BLOOD TYPING SEROLOGIC RH(D): CPT | Performed by: MIDWIFE

## 2024-07-14 PROCEDURE — 36415 COLL VENOUS BLD VENIPUNCTURE: CPT | Performed by: MIDWIFE

## 2024-07-14 PROCEDURE — 7210000002 HC LABOR PER HOUR

## 2024-07-14 PROCEDURE — 85027 COMPLETE CBC AUTOMATED: CPT | Performed by: MIDWIFE

## 2024-07-14 PROCEDURE — 86920 COMPATIBILITY TEST SPIN: CPT

## 2024-07-14 RX ORDER — METOCLOPRAMIDE 10 MG/1
10 TABLET ORAL EVERY 6 HOURS PRN
Status: DISCONTINUED | OUTPATIENT
Start: 2024-07-14 | End: 2024-07-17

## 2024-07-14 RX ORDER — HYDRALAZINE HYDROCHLORIDE 20 MG/ML
5 INJECTION INTRAMUSCULAR; INTRAVENOUS ONCE AS NEEDED
Status: DISCONTINUED | OUTPATIENT
Start: 2024-07-14 | End: 2024-07-17

## 2024-07-14 RX ORDER — NIFEDIPINE 10 MG/1
10 CAPSULE ORAL ONCE AS NEEDED
Status: DISCONTINUED | OUTPATIENT
Start: 2024-07-14 | End: 2024-07-17

## 2024-07-14 RX ORDER — SODIUM CHLORIDE, SODIUM LACTATE, POTASSIUM CHLORIDE, CALCIUM CHLORIDE 600; 310; 30; 20 MG/100ML; MG/100ML; MG/100ML; MG/100ML
125 INJECTION, SOLUTION INTRAVENOUS CONTINUOUS
Status: DISCONTINUED | OUTPATIENT
Start: 2024-07-15 | End: 2024-07-19 | Stop reason: HOSPADM

## 2024-07-14 RX ORDER — METOCLOPRAMIDE HYDROCHLORIDE 5 MG/ML
10 INJECTION INTRAMUSCULAR; INTRAVENOUS EVERY 6 HOURS PRN
Status: DISCONTINUED | OUTPATIENT
Start: 2024-07-14 | End: 2024-07-17

## 2024-07-14 RX ORDER — LABETALOL HYDROCHLORIDE 5 MG/ML
20 INJECTION, SOLUTION INTRAVENOUS ONCE AS NEEDED
Status: DISCONTINUED | OUTPATIENT
Start: 2024-07-14 | End: 2024-07-17

## 2024-07-14 RX ORDER — LIDOCAINE HYDROCHLORIDE 10 MG/ML
30 INJECTION INFILTRATION; PERINEURAL ONCE AS NEEDED
Status: DISCONTINUED | OUTPATIENT
Start: 2024-07-14 | End: 2024-07-17

## 2024-07-14 RX ORDER — ONDANSETRON HYDROCHLORIDE 2 MG/ML
4 INJECTION, SOLUTION INTRAVENOUS EVERY 6 HOURS PRN
Status: DISCONTINUED | OUTPATIENT
Start: 2024-07-14 | End: 2024-07-17

## 2024-07-14 RX ORDER — TERBUTALINE SULFATE 1 MG/ML
0.25 INJECTION SUBCUTANEOUS ONCE AS NEEDED
Status: DISCONTINUED | OUTPATIENT
Start: 2024-07-14 | End: 2024-07-17

## 2024-07-14 RX ORDER — ONDANSETRON 4 MG/1
4 TABLET, FILM COATED ORAL EVERY 6 HOURS PRN
Status: DISCONTINUED | OUTPATIENT
Start: 2024-07-14 | End: 2024-07-17

## 2024-07-14 SDOH — HEALTH STABILITY: MENTAL HEALTH: WISH TO BE DEAD (PAST 1 MONTH): NO

## 2024-07-14 SDOH — SOCIAL STABILITY: SOCIAL INSECURITY: ABUSE SCREEN: ADULT

## 2024-07-14 SDOH — SOCIAL STABILITY: SOCIAL INSECURITY: ARE THERE ANY APPARENT SIGNS OF INJURIES/BEHAVIORS THAT COULD BE RELATED TO ABUSE/NEGLECT?: NO

## 2024-07-14 SDOH — HEALTH STABILITY: MENTAL HEALTH: SUICIDAL BEHAVIOR (LIFETIME): NO

## 2024-07-14 SDOH — HEALTH STABILITY: MENTAL HEALTH: HAVE YOU USED ANY PRESCRIPTION DRUGS OTHER THAN PRESCRIBED IN THE PAST 12 MONTHS?: NO

## 2024-07-14 SDOH — ECONOMIC STABILITY: HOUSING INSECURITY: DO YOU FEEL UNSAFE GOING BACK TO THE PLACE WHERE YOU ARE LIVING?: NO

## 2024-07-14 SDOH — SOCIAL STABILITY: SOCIAL INSECURITY: HAVE YOU HAD THOUGHTS OF HARMING ANYONE ELSE?: NO

## 2024-07-14 SDOH — SOCIAL STABILITY: SOCIAL INSECURITY: VERBAL ABUSE: DENIES

## 2024-07-14 SDOH — HEALTH STABILITY: MENTAL HEALTH: NON-SPECIFIC ACTIVE SUICIDAL THOUGHTS (PAST 1 MONTH): NO

## 2024-07-14 SDOH — HEALTH STABILITY: MENTAL HEALTH: CURRENT SMOKER: 0

## 2024-07-14 SDOH — SOCIAL STABILITY: SOCIAL INSECURITY: PHYSICAL ABUSE: DENIES

## 2024-07-14 SDOH — SOCIAL STABILITY: SOCIAL INSECURITY: HAVE YOU HAD ANY THOUGHTS OF HARMING ANYONE ELSE?: NO

## 2024-07-14 SDOH — SOCIAL STABILITY: SOCIAL INSECURITY: HAS ANYONE EVER THREATENED TO HURT YOUR FAMILY OR YOUR PETS?: NO

## 2024-07-14 SDOH — SOCIAL STABILITY: SOCIAL INSECURITY: DOES ANYONE TRY TO KEEP YOU FROM HAVING/CONTACTING OTHER FRIENDS OR DOING THINGS OUTSIDE YOUR HOME?: NO

## 2024-07-14 SDOH — SOCIAL STABILITY: SOCIAL INSECURITY: ARE YOU OR HAVE YOU BEEN THREATENED OR ABUSED PHYSICALLY, EMOTIONALLY, OR SEXUALLY BY ANYONE?: NO

## 2024-07-14 SDOH — HEALTH STABILITY: MENTAL HEALTH: HAVE YOU USED ANY SUBSTANCES (CANABIS, COCAINE, HEROIN, HALLUCINOGENS, INHALANTS, ETC.) IN THE PAST 12 MONTHS?: NO

## 2024-07-14 SDOH — HEALTH STABILITY: MENTAL HEALTH: WERE YOU ABLE TO COMPLETE ALL THE BEHAVIORAL HEALTH SCREENINGS?: YES

## 2024-07-14 SDOH — SOCIAL STABILITY: SOCIAL INSECURITY: DO YOU FEEL ANYONE HAS EXPLOITED OR TAKEN ADVANTAGE OF YOU FINANCIALLY OR OF YOUR PERSONAL PROPERTY?: NO

## 2024-07-14 ASSESSMENT — LIFESTYLE VARIABLES
HOW MANY STANDARD DRINKS CONTAINING ALCOHOL DO YOU HAVE ON A TYPICAL DAY: PATIENT DOES NOT DRINK
HOW OFTEN DO YOU HAVE 6 OR MORE DRINKS ON ONE OCCASION: NEVER
SKIP TO QUESTIONS 9-10: 1
HOW OFTEN DO YOU HAVE A DRINK CONTAINING ALCOHOL: NEVER
AUDIT-C TOTAL SCORE: 0
AUDIT-C TOTAL SCORE: 0

## 2024-07-14 ASSESSMENT — PATIENT HEALTH QUESTIONNAIRE - PHQ9
1. LITTLE INTEREST OR PLEASURE IN DOING THINGS: NOT AT ALL
SUM OF ALL RESPONSES TO PHQ9 QUESTIONS 1 & 2: 0
2. FEELING DOWN, DEPRESSED OR HOPELESS: NOT AT ALL

## 2024-07-14 ASSESSMENT — PAIN SCALES - GENERAL
PAINLEVEL_OUTOF10: 8
PAINLEVEL_OUTOF10: 5 - MODERATE PAIN
PAINLEVEL_OUTOF10: 5 - MODERATE PAIN

## 2024-07-14 ASSESSMENT — ACTIVITIES OF DAILY LIVING (ADL): LACK_OF_TRANSPORTATION: NO

## 2024-07-15 ENCOUNTER — APPOINTMENT (OUTPATIENT)
Dept: OBSTETRICS AND GYNECOLOGY | Facility: CLINIC | Age: 24
End: 2024-07-15
Payer: COMMERCIAL

## 2024-07-15 LAB
ABO GROUP (TYPE) IN BLOOD: NORMAL
ANTIBODY SCREEN: NORMAL
ERYTHROCYTE [DISTWIDTH] IN BLOOD BY AUTOMATED COUNT: 17.6 % (ref 11.5–14.5)
HCT VFR BLD AUTO: 37.9 % (ref 36–46)
HGB BLD-MCNC: 12.4 G/DL (ref 12–16)
MCH RBC QN AUTO: 28.4 PG (ref 26–34)
MCHC RBC AUTO-ENTMCNC: 32.7 G/DL (ref 32–36)
MCV RBC AUTO: 87 FL (ref 80–100)
NRBC BLD-RTO: 0 /100 WBCS (ref 0–0)
PLATELET # BLD AUTO: 251 X10*3/UL (ref 150–450)
RBC # BLD AUTO: 4.36 X10*6/UL (ref 4–5.2)
RH FACTOR (ANTIGEN D): NORMAL
WBC # BLD AUTO: 11.9 X10*3/UL (ref 4.4–11.3)

## 2024-07-15 PROCEDURE — 7210000002 HC LABOR PER HOUR

## 2024-07-15 PROCEDURE — 1120000001 HC OB PRIVATE ROOM DAILY

## 2024-07-15 PROCEDURE — 2500000004 HC RX 250 GENERAL PHARMACY W/ HCPCS (ALT 636 FOR OP/ED): Performed by: MIDWIFE

## 2024-07-15 PROCEDURE — 10907ZC DRAINAGE OF AMNIOTIC FLUID, THERAPEUTIC FROM PRODUCTS OF CONCEPTION, VIA NATURAL OR ARTIFICIAL OPENING: ICD-10-PCS | Performed by: MIDWIFE

## 2024-07-15 PROCEDURE — 2500000004 HC RX 250 GENERAL PHARMACY W/ HCPCS (ALT 636 FOR OP/ED): Performed by: ADVANCED PRACTICE MIDWIFE

## 2024-07-15 RX ORDER — ACETAMINOPHEN 325 MG/1
975 TABLET ORAL EVERY 6 HOURS PRN
Status: DISCONTINUED | OUTPATIENT
Start: 2024-07-15 | End: 2024-07-17

## 2024-07-15 RX ORDER — DIPHENHYDRAMINE HYDROCHLORIDE 50 MG/ML
50 INJECTION INTRAMUSCULAR; INTRAVENOUS ONCE
Status: COMPLETED | OUTPATIENT
Start: 2024-07-15 | End: 2024-07-15

## 2024-07-15 ASSESSMENT — PAIN SCALES - GENERAL
PAINLEVEL_OUTOF10: 6
PAINLEVEL_OUTOF10: 7
PAINLEVEL_OUTOF10: 5 - MODERATE PAIN
PAINLEVEL_OUTOF10: 7
PAINLEVEL_OUTOF10: 6
PAINLEVEL_OUTOF10: 9

## 2024-07-15 ASSESSMENT — PAIN DESCRIPTION - DESCRIPTORS: DESCRIPTORS: ACHING

## 2024-07-15 NOTE — ANESTHESIA PREPROCEDURE EVALUATION
Patient: Babs Brown    Evaluation Method: In-person visit    Procedure Information    Date: 24  Procedure: Labor Consult      39wk6d arrived in Labor, TOLAC. Primary section for fetal distress, planning NCB this pregnancy. H/o migraines, ADHD. Will accept blood products. Denies problems with anesthesia.    Relevant Problems   Endocrine   (+) Obesity in pregnancy ([facility])      Hematology   (+) Anemia affecting pregnancy in third trimester ([facility])       Clinical information reviewed:   Tobacco  Allergies  Meds  Problems  Med Hx  Surg Hx   Fam Hx  Soc   Hx        NPO Detail:  No data recorded     OB/Gyn Evaluation    Present Pregnancy    Patient is pregnant now.  (+) , previous  section - primary   Obstetric History            Physical Exam    Airway  Mallampati: II  TM distance: >3 FB  Neck ROM: full     Cardiovascular - normal exam  Rhythm: regular  Rate: normal     Dental    Pulmonary - normal exam     Abdominal - normal exam         Anesthesia Plan    History of general anesthesia?: yes  History of complications of general anesthesia?: no    ASA 3     other   (Desires NCB with epidural prn)  The patient is not a current smoker.    Anesthetic plan and risks discussed with patient.  Use of blood products discussed with patient who consented to blood products.    Plan discussed with CRNA.

## 2024-07-15 NOTE — CARE PLAN
The patient's goals for the shift include  minimal intervention / labor     The clinical goals for the shift include augmentation of labor

## 2024-07-15 NOTE — PROGRESS NOTES
Intrapartum Progress Note    Assessment/Plan   Babs Brown is a 24 y.o.  at 40w0d. YOVANY: 7/15/2024, by Last Menstrual Period.     Pregnancy c/b:   -h/o primary  delivery, desires TOLAC  -anemia, corrected with PO iron   -rubella non-immune   -varicella non-immune  -obesity, BMI 37      A:   -latent labor  -FHT CAT 1  -Admission MFMU score 60.9   -GBS negative  -Rh positive     P:  -continue to monitor maternal/fetal wellbeing  -cEFM   -goal for NCB   -offer PP Rubella     PURNIMA Gasca APRN-CNM     Principal Problem:    Previous  delivery affecting pregnancy ([facility])    Pregnancy Problems (from 22 to present)       Problem Noted Resolved    Anemia affecting pregnancy in third trimester ([facility]) 3/4/2024 by KUMAR Waters-CNARNIE, APRN-CNP No    Priority:  Medium      Overview Signed 3/4/2024 12:00 PM by Luisa Farah APRN-CNARNIE, APRN-CNP     Start Iron supplement second trimester   25 week check retic count with CBC         Suspected fetal anomaly, antepartum ([facility]) 2024 by Germania Mejia MD No    Priority:  Medium      Rubella non-immune status, antepartum ([facility]) 2023 by Luisa Farah APRN-CNM, APRN-CNP No    Priority:  Medium      Overview Signed 2024  5:43 PM by Luisa Farah APRN-CNARNIE, APRN-CNP     Vaccinate PP         Maternal varicella, non-immune ([facility]) 2023 by Luisa Farah APRN-CNM, APRN-CNP No    Priority:  Medium      Overview Signed 2024  5:43 PM by Luisa Farah APRN-CNM, APRN-CNP     Vaccinate PP         Obesity in pregnancy ([facility]) 2023 by Luisa Farah APRN-CNM, APRN-CNP No    Priority:  Medium      Multigravida in second trimester ([facility]) 2024 by Luisa Farah APRN-CNM, APRN-CNP 2024 by KUMAR Bernal-EDDIE    Priority:  Medium              Subjective     Pt is getting more uncomfortable and starting to have pelvic and rectal pressure. She would like to attempt  BM but would like SVE first.     Objective   Last Vitals:  Temp Pulse Resp BP MAP Pulse Ox   36.9 °C (98.5 °F) 90 18 116/72   99 %     Vitals Min/Max Last 24 Hours:  Temp  Min: 36.2 °C (97.2 °F)  Max: 37 °C (98.6 °F)  Pulse  Min: 79  Max: 105  Resp  Min: 16  Max: 18  BP  Min: 104/66  Max: 123/67    Intake/Output:  No intake or output data in the 24 hours ending 07/15/24 1809    Physical Examination:  GENERAL: Examination reveals a well developed, well nourished, gravid female in no acute distress. She is alert and cooperative.  LUNGS:  easy respiratory effort   FHR is 150 BPM, with moderate variability and accels; occasional non-recurrent variable decels, no early/late decels or sinusoidal patterns. CAT 2 tracing, overall reassuring.   [place] reading:  Contractions every 1 - 2.5  minutes, duration of 30 -60 seconds, mild to moderate contractile intensity and soft uterine resting tone   CERVIX: 5 cm dilated, 80 % effaced, 0 station -> fetal head more tightly applied to cervix and coming back down since last exam; MEMBRANES are  (leaking) - clear fluid   NEUROLOGICAL: alert, oriented, normal speech, no focal findings or movement disorder noted  PSYCHOLOGICAL: awake and alert; oriented to person, place, and time    Lab Review:  Labs in chart were reviewed.

## 2024-07-15 NOTE — H&P
Obstetrical Admission History and Physical     Babs VANI Brown is a 24 y.o.  at 39w6d. YOVANY: 7/15/2024, by Last Menstrual Period. Estimated fetal weight: 7#. She has had prenatal care with [facility] service .    Chief Complaint: Contractions (CTX since 5pm, denies leakage of fluid, vaginal spotting overnight. EFM +)    Assessment/Plan     Pregnancy c/b:   -h/o primary  delivery, desires TOLAC  -anemia, taking PO iron   -rubella non-immune   -varicella non-immune  -obesity, BMI 37     A:   -latent labor  -FHT CAT 1  -Admission MFMU score 60.9   -GBS negative  -Rh positive    P:  -admission, routine labs  -cEFM   -goal for NCB     OB attending Dr. 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     Active Problems:  There are no active Hospital Problems.      Pregnancy Problems (from 22 to present)       Problem Noted Resolved    Anemia affecting pregnancy in third trimester ([facility]) 3/4/2024 by NELL Waters, KUMAR-CNP No    Priority:  Medium      Overview Signed 3/4/2024 12:00 PM by NELL Waters, APRN-CNP     Start Iron supplement second trimester   25 week check retic count with CBC         Multigravida in second trimester ([facility]) 2024 by NELL Waters, APRN-CNP No    Priority:  Medium      Suspected fetal anomaly, antepartum ([facility]) 2024 by Germania Mejia MD No    Priority:  Medium      Rubella non-immune status, antepartum ([facility]) 2023 by NELL Waters, APRN-CNP No    Priority:  Medium      Overview Signed 2024  5:43 PM by NELL Waters, APRN-CNP     Vaccinate PP         Maternal  varicella, non-immune ([facility]) 2023 by NELL Waters APRN-CNP No    Priority:  Medium      Overview Signed 2024  5:43 PM by NELL Waters APRN-CNP     Vaccinate PP         Obesity in pregnancy ([facility]) 2023 by NELL Waters APRN-CNP No    Priority:  Medium            The previous  section has been discussed with the patient. Options for delivery of this pregnancy have been discussed, and the patient elects a trial of labor after . Trial of labor has been discussed in detail. The risks, benefits, complications, alternatives, expected outcomes, potential problems during recuperation and recovery, and the risks of not performing the procedure were discussed with the patient. The patient stated understanding that the risks of a trial of labor include, but are not limited to: death; reaction to medications; uterine rupture; injury to bowel, bladder, ureters, uterus, cervix, vagina, and other pelvic and abdominal structures; infection; blood loss and possible need for transfusion; and potential need for surgery, including hysterectomy. Additionally, the fetal risks are major, including death, with a uterine rupture. The possible need for  section in the future was explained. All questions were answered. There was concurrence with the planned procedure, and the patient wanted to proceed.    Admit to inpatient status. I anticipate that this patient will require a stay exceeding at least 2 midnights for delivery and postpartum.  Proceed with trial of labor, as requested, with monitoring.  Management of pregnancy complications, as indicated.    Claudine Brice has had 0 previous vaginal delivery(ies) and 1 previous  section(s).   The patient is requesting a trial of labor for vaginal delivery after  birth. A review of her previous  section(s) and her previous obstetrical and gynecological history indicates  that she is a good candidate for a trial of labor.  She arrives to [facility] L&D triage with her  and c/o contractions and spotting since about 1700 this evening. She endorses regular fetal movement and denies HA, blurred vision, chest or RUQ pain, urinary complaints, vaginal LOF. She has had some light vaginal spotting throughout the day and ongoing BLE edema.     We discussed plan of care and she would like to be admitted with expectant management at this time. We reviewed MFMU score and she is committed to TOLAC.        Obstetrical History   OB History    Para Term  AB Living   3 1 1   1 1   SAB IAB Ectopic Multiple Live Births   1              # Outcome Date GA Lbr Natan/2nd Weight Sex Type Anes PTL Lv   3 Current            2 Term 23    M CS-LTranv Gen N       Complications: Fetal Intolerance   1 SAB 2022               Past Medical History  Past Medical History:   Diagnosis Date    ADHD     Anxiety     Depression     Encounter for  screening for malformations ([facility]) 2022    Encounter for  screening for malformations    Migraine         Past Surgical History   Past Surgical History:   Procedure Laterality Date     SECTION, LOW TRANSVERSE      OTHER SURGICAL HISTORY  2022    Foot surgery    OTHER SURGICAL HISTORY  2022    Dilation and curettage       Social History  Social History     Tobacco Use    Smoking status: Never    Smokeless tobacco: Never   Substance Use Topics    Alcohol use: Not Currently     Substance and Sexual Activity   Drug Use Never       Allergies  Lactose and Topamax [topiramate]     Medications  Medications Prior to Admission   Medication Sig Dispense Refill Last Dose    acetaminophen (Tylenol Extra Strength) 500 mg tablet Take 2 tablets (1,000 mg) by mouth every 6 hours if needed for mild pain (1 - 3). 30 tablet 0 2024    magnesium 200 mg tablet Take 1 tablet (200 mg) by mouth 2 times a day. 60 tablet 11 2024     prenatal vitamin, iron-folic, (prenatal vit no.130-iron-folic) 27 mg iron-800 mcg folic acid tablet Take 1 tablet by mouth once daily. 30 tablet 11 7/14/2024       Objective    Last Vitals  Temp Pulse Resp BP MAP O2 Sat     83 18 120/75   97 %     Physical Examination  GENERAL: Examination reveals a well developed, well nourished, gravid female in no acute distress. She is alert and cooperative.  HEENT: PERRLA. External ears normal. Nose normal, no erythema or discharge. Mouth and throat clear.  NECK: supple, no significant adenopathy  LUNGS: clear to auscultation bilaterally  HEART: regular rate and rhythm, S1, S2 normal, no murmur, click, rub or gallop  BREASTS: breasts appear normal for pregnancy, no suspicious masses, no skin or nipple changes or axillary nodes  ABDOMEN: soft, gravid, nontender, nondistended, no abnormal masses, no epigastric pain  FHR is 150 BPM, with moderate variability and accels, no decels or sinusoidal patterns. CAT 1 tracing.    [place] reading:  Contractions every 4 - 7 minutes, duration of 60 - 70 seconds, mild contractile intensity and soft uterine resting tone   The fetus is in a vertex presentation, determined by ultrasound  Current Estimated Fetal Weight 7# established by Leopold's maneuver  VAGINA: normal appearing vagina with normal color and discharge and no lesions noted  CERVIX: 4 cm dilated, 80 % effaced, -2 station; MEMBRANES are Intact  EXTREMITIES: no redness or tenderness in the calves or thighs, edema 1+ BLE+  SKIN: normal coloration and turgor, no rashes  NEUROLOGICAL: alert, oriented, normal speech, no focal findings or movement disorder noted  PSYCHOLOGICAL: awake and alert; oriented to person, place, and time    Lab Review  Labs in chart were reviewed.

## 2024-07-15 NOTE — PROGRESS NOTES
Intrapartum Progress Note    Assessment/Plan   Babs Brown is a 24 y.o.  at 40w0d. YOVANY: 7/15/2024, by Last Menstrual Period.     Pregnancy c/b:   -h/o primary  delivery, desires TOLAC  -anemia, corrected with PO iron   -rubella non-immune   -varicella non-immune  -obesity, BMI 37      A:   -latent labor  -FHT CAT 1  -Admission MFMU score 60.9   -GBS negative  -Rh positive     P:  -continue to monitor maternal/fetal wellbeing  -cEFM   -goal for NCB   -offer PP Rubella     PURNIMA Gasca APRN-CNM     Principal Problem:    Previous  delivery affecting pregnancy ([facility])    Pregnancy Problems (from 22 to present)       Problem Noted Resolved    Anemia affecting pregnancy in third trimester ([facility]) 3/4/2024 by KUMAR Waters-CNARNIE, APRN-CNP No    Priority:  Medium      Overview Signed 3/4/2024 12:00 PM by Luisa Farah APRN-CNARNIE, APRN-CNP     Start Iron supplement second trimester   25 week check retic count with CBC         Suspected fetal anomaly, antepartum ([facility]) 2024 by Germania Mejia MD No    Priority:  Medium      Rubella non-immune status, antepartum ([facility]) 2023 by Luisa Farah APRN-CNM, APRN-CNP No    Priority:  Medium      Overview Signed 2024  5:43 PM by Luisa Farah APRN-CNARNIE, APRN-CNP     Vaccinate PP         Maternal varicella, non-immune ([facility]) 2023 by Luisa Farah APRN-CNM, APRN-CNP No    Priority:  Medium      Overview Signed 2024  5:43 PM by Luisa Farah APRN-CNM, APRN-CNP     Vaccinate PP         Obesity in pregnancy ([facility]) 2023 by Luisa Farah APRN-CNM, APRN-CNP No    Priority:  Medium      Multigravida in second trimester ([facility]) 2024 by Luisa Farah APRN-CNM, APRN-CNP 2024 by KUMAR Bernal-EDDIE    Priority:  Medium              Subjective     Pt resting when able and coping effectively at this time.  at bedside.     Objective   Last  Vitals:  Temp Pulse Resp BP MAP Pulse Ox   37 °C (98.6 °F) 97 17 110/73   98 %     Vitals Min/Max Last 24 Hours:  Temp  Min: 37 °C (98.6 °F)  Max: 37 °C (98.6 °F)  Pulse  Min: 83  Max: 99  Resp  Min: 17  Max: 18  BP  Min: 110/73  Max: 120/75    Intake/Output:  No intake or output data in the 24 hours ending 07/15/24 0301    Physical Examination:  GENERAL: Examination reveals a well developed, well nourished, gravid female in no acute distress. She is alert and cooperative.  LUNGS:  easy respiratory effort  FHR is 150 BPM, with moderate variability and accels, no decels or sinusoidal patterns. CAT 1 tracing.   [place] reading:  Contractions eery 1.5 - 3.5 minutes, duration of 30 - 60 seconds, mild contractile intensity and soft uterine resting tone.   CERVIX: not evaluated; MEMBRANES are Intact  PSYCHOLOGICAL: awake and alert; oriented to person, place, and time    Lab Review:  Labs in chart were reviewed.

## 2024-07-15 NOTE — PROGRESS NOTES
Intrapartum Progress Note    Assessment/Plan   Babs Brown is a 24 y.o.  at 40w0d. YOVANY: 7/15/2024, by Last Menstrual Period.     Pregnancy c/b:   -h/o primary  delivery, desires TOLAC  -anemia, corrected with PO iron   -rubella non-immune   -varicella non-immune  -obesity, BMI 37      A:   -latent labor  -FHT CAT 1  -Admission MFMU score 60.9   -GBS negative  -Rh positive     P:  -continue to monitor maternal/fetal wellbeing  -cEFM   -goal for NCB   -offer PP Rubella     PURNIMA Gasca APRN-CNM     Principal Problem:    Previous  delivery affecting pregnancy ([facility])    Pregnancy Problems (from 22 to present)       Problem Noted Resolved    Anemia affecting pregnancy in third trimester ([facility]) 3/4/2024 by KUMAR Waters-CNARNIE, APRN-CNP No    Priority:  Medium      Overview Signed 3/4/2024 12:00 PM by Luisa Farah APRN-CNARNIE, APRN-CNP     Start Iron supplement second trimester   25 week check retic count with CBC         Suspected fetal anomaly, antepartum ([facility]) 2024 by Germania Mejia MD No    Priority:  Medium      Rubella non-immune status, antepartum ([facility]) 2023 by Luisa Farah APRN-CNM, APRN-CNP No    Priority:  Medium      Overview Signed 2024  5:43 PM by Luisa Farah APRN-CNARNIE, APRN-CNP     Vaccinate PP         Maternal varicella, non-immune ([facility]) 2023 by Luisa Farah APRN-CNM, APRN-CNP No    Priority:  Medium      Overview Signed 2024  5:43 PM by Luisa Farah APRN-CNM, APRN-CNP     Vaccinate PP         Obesity in pregnancy ([facility]) 2023 by Luisa Farah APRN-CNM, APRN-CNP No    Priority:  Medium      Multigravida in second trimester ([facility]) 2024 by Luisa Farah APRN-CNM, APRN-CNP 2024 by KUMAR Bernal-EDDIE    Priority:  Medium              Subjective     Pt does not desire discharge and has been working towards labor progress with position changes and nipple  stimulation. Discussed additional interventions, desires ROM.     Objective   Last Vitals:  Temp Pulse Resp BP MAP Pulse Ox   36.9 °C (98.5 °F) 105 18 119/64   98 %     Vitals Min/Max Last 24 Hours:  Temp  Min: 36.2 °C (97.2 °F)  Max: 37 °C (98.6 °F)  Pulse  Min: 79  Max: 105  Resp  Min: 16  Max: 18  BP  Min: 104/66  Max: 123/67    Intake/Output:  No intake or output data in the 24 hours ending 07/15/24 7777    Physical Examination:  GENERAL: Examination reveals a well developed, well nourished, gravid female in no acute distress. She is alert and cooperative.  LUNGS:  easy respiratory effort   FHR is 150 BPM, with moderate variability and Accelerations; no decels or sinusoidal patterns. CAT 1 tracing.   [place] reading:  Contractions every 1 - 4 minutes, duration of 20 - 60 seconds, mild contractile intensity and soft uterine resting tone.   CERVIX: 5 cm dilated, 80 % effaced, 0 station; MEMBRANES are AROM for clear   NEUROLOGICAL: alert, oriented, normal speech, no focal findings or movement disorder noted  PSYCHOLOGICAL: awake and alert; oriented to person, place, and time    Lab Review:  Labs in chart were reviewed.

## 2024-07-15 NOTE — PROGRESS NOTES
Intrapartum Progress Note    Assessment/Plan   Babs Brown is a 24 y.o.  at 40w0d. YOVANY: 7/15/2024, by Last Menstrual Period.     Pregnancy c/b:   -h/o primary  delivery, desires TOLAC  -anemia, corrected with PO iron   -rubella non-immune   -varicella non-immune  -obesity, BMI 37      A:   -latent labor  -FHT CAT 1  -Admission MFMU score 60.9   -GBS negative  -Rh positive     P:  -continue to monitor maternal/fetal wellbeing  -cEFM   -goal for NCB   -offer PP Rubella   -pt now deciding dispo discharge vs continuing admission    PURNIMA CAMEJO     Updated entry @ 1224:   Pt has decided to stay and have VE recheck in 4H and will consider AROM at that point PRN.     PURNIMA CAMEJO     Principal Problem:    Previous  delivery affecting pregnancy ([facility])    Pregnancy Problems (from 22 to present)       Problem Noted Resolved    Anemia affecting pregnancy in third trimester ([facility]) 3/4/2024 by NELL Waters, APRN-CNP No    Priority:  Medium      Overview Signed 3/4/2024 12:00 PM by NELL Waters, APRN-CNP     Start Iron supplement second trimester   25 week check retic count with CBC         Suspected fetal anomaly, antepartum ([facility]) 2024 by Germania Mejia MD No    Priority:  Medium      Rubella non-immune status, antepartum ([facility]) 2023 by NELL Waters, APRN-CNP No    Priority:  Medium      Overview Signed 2024  5:43 PM by NELL Waters, APRN-CNP     Vaccinate PP         Maternal varicella, non-immune ([facility]) 2023 by NELL Waters, APRN-CNP No    Priority:  Medium      Overview Signed 2024  5:43 PM by NELL Waters, APRN-CNP     Vaccinate PP         Obesity in pregnancy ([facility]) 2023 by NELL Waters, APRN-CNP No    Priority:  Medium      Multigravida in second trimester ([facility]) 2024 by KUMAR Waters-EDDIE,  APRN-CNP 7/14/2024 by NELL Bernal    Priority:  Medium              Subjective     Pt feeling more pressure and contractions in her back. She is repositioning and using nipple stimulation. She endorses regular fetal movement and denies HA, blurred vision, chest or RUQ pain, urinary complaints, vaginal bleeding or LOF. She would like SVE.     Following exam, we discussed minimal changes since last VE. Pt's  would like discharge to home, pt uncertain as to coping at home. They had some questions r/t latent vs active labor and anticipated progression that were answered to their satisfaction. They would like to discuss this with their  over the phone and will decide on potential discharge to home.     Objective   Last Vitals:  Temp Pulse Resp BP MAP Pulse Ox   37 °C (98.6 °F) 83 16 109/71   98 %     Vitals Min/Max Last 24 Hours:  Temp  Min: 36.2 °C (97.2 °F)  Max: 37 °C (98.6 °F)  Pulse  Min: 79  Max: 99  Resp  Min: 16  Max: 18  BP  Min: 104/66  Max: 120/75    Intake/Output:  No intake or output data in the 24 hours ending 07/15/24 1209    Physical Examination:  GENERAL: Examination reveals a well developed, well nourished, gravid female in no acute distress. She is alert and cooperative.  HEENT: PERRLA. External ears normal. Nose normal, no erythema or discharge. Mouth and throat clear.  LUNGS:  easy respiratory effort  FHR is 150 BPM, with moderate variability and Accelerations; no decels or sinusoidal patterns. CAT 1 tracing.    [place] reading:  Contractions every 1 - 3 minutes, duration of 20 - 60 seconds, mild to slightly moderate contractile intensity and soft uterine resting tone.   CERVIX: 5.5 cm dilated, 80 % effaced, -1 station; MEMBRANES are Intact  EXTREMITIES: no redness or tenderness in the calves or thighs, edema 1+ BLE+  SKIN: normal coloration and turgor, no rashes  NEUROLOGICAL: alert, oriented, normal speech, no focal findings or movement disorder noted  PSYCHOLOGICAL: awake  and alert; oriented to person, place, and time    Lab Review:  Labs in chart were reviewed.

## 2024-07-15 NOTE — PROGRESS NOTES
Intrapartum Progress Note    Assessment/Plan   Babs Brown is a 24 y.o.  at 40w0d. YOVANY: 7/15/2024, by Last Menstrual Period.     Pregnancy c/b:   -h/o primary  delivery, desires TOLAC  -anemia, corrected with PO iron   -rubella non-immune   -varicella non-immune  -obesity, BMI 37      A:   -latent labor  -FHT CAT 1  -Admission MFMU score 60.9   -GBS negative  -Rh positive     P:  -continue to monitor maternal/fetal wellbeing  -cEFM   -goal for NCB   -offer PP Rubella     PURNIMA Gasca APRN-CNM     Principal Problem:    Previous  delivery affecting pregnancy ([facility])    Pregnancy Problems (from 22 to present)       Problem Noted Resolved    Anemia affecting pregnancy in third trimester ([facility]) 3/4/2024 by KUMAR Waters-CNARNIE, APRN-CNP No    Priority:  Medium      Overview Signed 3/4/2024 12:00 PM by Luisa Farah APRN-CNARNIE, APRN-CNP     Start Iron supplement second trimester   25 week check retic count with CBC         Suspected fetal anomaly, antepartum ([facility]) 2024 by Germania Mejia MD No    Priority:  Medium      Rubella non-immune status, antepartum ([facility]) 2023 by Luisa Farah APRN-CNM, APRN-CNP No    Priority:  Medium      Overview Signed 2024  5:43 PM by Luisa Farah APRN-CNARNIE, APRN-CNP     Vaccinate PP         Maternal varicella, non-immune ([facility]) 2023 by Luisa Farah APRN-CNM, APRN-CNP No    Priority:  Medium      Overview Signed 2024  5:43 PM by Luisa Farah APRN-CNM, APRN-CNP     Vaccinate PP         Obesity in pregnancy ([facility]) 2023 by Luisa Farah APRN-CNM, APRN-CNP No    Priority:  Medium      Multigravida in second trimester ([facility]) 2024 by Luisa Farah APRN-CNM, APRN-CNP 2024 by KUMAR Bernal-EDDIE    Priority:  Medium              Subjective     PT requests SVE, CNM to bedside. They are frustrated that labor pattern is irregular. Pt's  verbalized  "frustration that they were admitted \"too early\" and before active labor and notes it was \"hammered in\" that they must come to the hospital with early labor because of the previous  delivery and that they have been here overnight and they would like to go home. Discussed that they were given the option and opted for admission and that they can be discharged if desired. Pt had some questions r/t AROM that were answered to her satisfaction. Exam completed, they were offered discharge. They would like to talk to their .     Objective   Last Vitals:  Temp Pulse Resp BP MAP Pulse Ox   36.2 °C (97.2 °F) 79 16 104/66   98 %     Vitals Min/Max Last 24 Hours:  Temp  Min: 36.2 °C (97.2 °F)  Max: 37 °C (98.6 °F)  Pulse  Min: 79  Max: 99  Resp  Min: 16  Max: 18  BP  Min: 104/66  Max: 120/75    Intake/Output:  No intake or output data in the 24 hours ending 07/15/24 0702    Physical Examination:  GENERAL: Examination reveals a well developed, well nourished, gravid female in no acute distress. She is alert and cooperative.  LUNGS:  easy respiratory effort  FHR is 150 BPM, with moderate variability and Accelerations; no decels or sinudoidal patterns. CAT 1 tracing.    [place] reading: Contractions every 1.5 - 3 minutes, duration 20 - 90 seconds, mild to moderate contractile pattern, soft uterine resting tone.    CERVIX: 5 cm dilated, 80 % effaced, -1 station; MEMBRANES are Intact  PSYCHOLOGICAL: awake and alert; oriented to person, place, and time    Lab Review:  Labs in chart were reviewed.  "

## 2024-07-16 ENCOUNTER — ANESTHESIA (OUTPATIENT)
Dept: OBSTETRICS AND GYNECOLOGY | Facility: HOSPITAL | Age: 24
End: 2024-07-16
Payer: COMMERCIAL

## 2024-07-16 ENCOUNTER — ANESTHESIA EVENT (OUTPATIENT)
Dept: OBSTETRICS AND GYNECOLOGY | Facility: HOSPITAL | Age: 24
End: 2024-07-16
Payer: COMMERCIAL

## 2024-07-16 LAB
BASE EXCESS BLDCOA CALC-SCNC: -1.1 MMOL/L (ref -10.8–-0.5)
BASE EXCESS BLDCOV CALC-SCNC: -2.3 MMOL/L (ref -8.1–-0.5)
BLOOD EXPIRATION DATE: NORMAL
BODY TEMPERATURE: 37 DEGREES CELSIUS
BODY TEMPERATURE: 37 DEGREES CELSIUS
DISPENSE STATUS: NORMAL
ERYTHROCYTE [DISTWIDTH] IN BLOOD BY AUTOMATED COUNT: 17.8 % (ref 11.5–14.5)
HCO3 BLDCOA-SCNC: 23.5 MMOL/L (ref 15–29)
HCO3 BLDCOV-SCNC: 22.5 MMOL/L (ref 16–26)
HCT VFR BLD AUTO: 31.8 % (ref 36–46)
HGB BLD-MCNC: 10.7 G/DL (ref 12–16)
INHALED O2 CONCENTRATION: 21 %
INHALED O2 CONCENTRATION: 21 %
MCH RBC QN AUTO: 29.7 PG (ref 26–34)
MCHC RBC AUTO-ENTMCNC: 33.6 G/DL (ref 32–36)
MCV RBC AUTO: 88 FL (ref 80–100)
NRBC BLD-RTO: 0 /100 WBCS (ref 0–0)
OXYHGB MFR BLDCOA: 56.4 % (ref 94–98)
OXYHGB MFR BLDCOV: 57.9 % (ref 94–98)
PCO2 BLDCOA: 38 MM HG (ref 31–75)
PCO2 BLDCOV: 38 MM HG (ref 22–53)
PH BLDCOA: 7.4 PH (ref 7.08–7.39)
PH BLDCOV: 7.38 PH (ref 7.19–7.47)
PLATELET # BLD AUTO: 226 X10*3/UL (ref 150–450)
PO2 BLDCOA: 32 MM HG (ref 5–31)
PO2 BLDCOV: 30 MM HG (ref 13–37)
PRODUCT BLOOD TYPE: 7300
PRODUCT CODE: NORMAL
RBC # BLD AUTO: 3.6 X10*6/UL (ref 4–5.2)
SAO2 % BLDCOA: 58 % (ref 3–69)
SAO2 % BLDCOV: 59 % (ref 16–84)
UNIT ABO: NORMAL
UNIT NUMBER: NORMAL
UNIT RH: NORMAL
UNIT VOLUME: 350
WBC # BLD AUTO: 19.2 X10*3/UL (ref 4.4–11.3)
XM INTEP: NORMAL

## 2024-07-16 PROCEDURE — 2500000004 HC RX 250 GENERAL PHARMACY W/ HCPCS (ALT 636 FOR OP/ED): Performed by: NURSE ANESTHETIST, CERTIFIED REGISTERED

## 2024-07-16 PROCEDURE — 88307 TISSUE EXAM BY PATHOLOGIST: CPT | Mod: TC,AHULAB | Performed by: OBSTETRICS & GYNECOLOGY

## 2024-07-16 PROCEDURE — 1100000001 HC PRIVATE ROOM DAILY

## 2024-07-16 PROCEDURE — 51702 INSERT TEMP BLADDER CATH: CPT

## 2024-07-16 PROCEDURE — 85384 FIBRINOGEN ACTIVITY: CPT | Performed by: OBSTETRICS & GYNECOLOGY

## 2024-07-16 PROCEDURE — 7210000002 HC LABOR PER HOUR

## 2024-07-16 PROCEDURE — 2500000005 HC RX 250 GENERAL PHARMACY W/O HCPCS: Performed by: NURSE ANESTHETIST, CERTIFIED REGISTERED

## 2024-07-16 PROCEDURE — 7100000016 HC LABOR RECOVERY PER HOUR

## 2024-07-16 PROCEDURE — 82805 BLOOD GASES W/O2 SATURATION: CPT | Performed by: MIDWIFE

## 2024-07-16 PROCEDURE — 59514 CESAREAN DELIVERY ONLY: CPT | Performed by: OBSTETRICS & GYNECOLOGY

## 2024-07-16 PROCEDURE — 85027 COMPLETE CBC AUTOMATED: CPT | Performed by: OBSTETRICS & GYNECOLOGY

## 2024-07-16 PROCEDURE — 2500000004 HC RX 250 GENERAL PHARMACY W/ HCPCS (ALT 636 FOR OP/ED): Performed by: MIDWIFE

## 2024-07-16 PROCEDURE — 3700000014 HC AN EPIDURAL BLOCK CHARGE: Performed by: OBSTETRICS & GYNECOLOGY

## 2024-07-16 PROCEDURE — 2500000002 HC RX 250 W HCPCS SELF ADMINISTERED DRUGS (ALT 637 FOR MEDICARE OP, ALT 636 FOR OP/ED): Performed by: MIDWIFE

## 2024-07-16 PROCEDURE — 3700000014 EPIDURAL BLOCK: Performed by: NURSE ANESTHETIST, CERTIFIED REGISTERED

## 2024-07-16 PROCEDURE — 3E033VJ INTRODUCTION OF OTHER HORMONE INTO PERIPHERAL VEIN, PERCUTANEOUS APPROACH: ICD-10-PCS | Performed by: ADVANCED PRACTICE MIDWIFE

## 2024-07-16 PROCEDURE — 7100000016 HC LABOR RECOVERY PER HOUR: Performed by: OBSTETRICS & GYNECOLOGY

## 2024-07-16 PROCEDURE — 51701 INSERT BLADDER CATHETER: CPT

## 2024-07-16 PROCEDURE — 2500000001 HC RX 250 WO HCPCS SELF ADMINISTERED DRUGS (ALT 637 FOR MEDICARE OP): Performed by: NURSE ANESTHETIST, CERTIFIED REGISTERED

## 2024-07-16 PROCEDURE — 2500000004 HC RX 250 GENERAL PHARMACY W/ HCPCS (ALT 636 FOR OP/ED): Performed by: ADVANCED PRACTICE MIDWIFE

## 2024-07-16 RX ORDER — KETOROLAC TROMETHAMINE 30 MG/ML
INJECTION, SOLUTION INTRAMUSCULAR; INTRAVENOUS AS NEEDED
Status: DISCONTINUED | OUTPATIENT
Start: 2024-07-16 | End: 2024-07-16

## 2024-07-16 RX ORDER — MORPHINE SULFATE 1 MG/ML
INJECTION, SOLUTION EPIDURAL; INTRATHECAL; INTRAVENOUS AS NEEDED
Status: DISCONTINUED | OUTPATIENT
Start: 2024-07-16 | End: 2024-07-16

## 2024-07-16 RX ORDER — FENTANYL CITRATE 50 UG/ML
INJECTION, SOLUTION INTRAMUSCULAR; INTRAVENOUS AS NEEDED
Status: DISCONTINUED | OUTPATIENT
Start: 2024-07-16 | End: 2024-07-16

## 2024-07-16 RX ORDER — CEFAZOLIN SODIUM 2 G/100ML
2 INJECTION, SOLUTION INTRAVENOUS ONCE
Status: DISCONTINUED | OUTPATIENT
Start: 2024-07-16 | End: 2024-07-17

## 2024-07-16 RX ORDER — OXYTOCIN 10 [USP'U]/ML
10 INJECTION, SOLUTION INTRAMUSCULAR; INTRAVENOUS ONCE AS NEEDED
Status: DISCONTINUED | OUTPATIENT
Start: 2024-07-16 | End: 2024-07-17

## 2024-07-16 RX ORDER — CEFAZOLIN SODIUM 1 G/50ML
1 SOLUTION INTRAVENOUS EVERY 8 HOURS
Status: COMPLETED | OUTPATIENT
Start: 2024-07-17 | End: 2024-07-17

## 2024-07-16 RX ORDER — METHYLERGONOVINE MALEATE 0.2 MG/ML
0.2 INJECTION INTRAVENOUS ONCE AS NEEDED
Status: COMPLETED | OUTPATIENT
Start: 2024-07-16 | End: 2024-07-16

## 2024-07-16 RX ORDER — ACETAMINOPHEN 120 MG/1
SUPPOSITORY RECTAL AS NEEDED
Status: DISCONTINUED | OUTPATIENT
Start: 2024-07-16 | End: 2024-07-16

## 2024-07-16 RX ORDER — LIDOCAINE HCL/EPINEPHRINE/PF 2%-1:200K
VIAL (ML) INJECTION AS NEEDED
Status: DISCONTINUED | OUTPATIENT
Start: 2024-07-16 | End: 2024-07-16

## 2024-07-16 RX ORDER — CEFAZOLIN 1 G/1
INJECTION, POWDER, FOR SOLUTION INTRAVENOUS AS NEEDED
Status: DISCONTINUED | OUTPATIENT
Start: 2024-07-16 | End: 2024-07-16

## 2024-07-16 RX ORDER — TRANEXAMIC ACID 100 MG/ML
1000 INJECTION, SOLUTION INTRAVENOUS ONCE AS NEEDED
Status: DISCONTINUED | OUTPATIENT
Start: 2024-07-16 | End: 2024-07-17

## 2024-07-16 RX ORDER — METHYLERGONOVINE MALEATE 0.2 MG/ML
INJECTION INTRAVENOUS AS NEEDED
Status: DISCONTINUED | OUTPATIENT
Start: 2024-07-16 | End: 2024-07-16

## 2024-07-16 RX ORDER — MIDAZOLAM HYDROCHLORIDE 1 MG/ML
INJECTION, SOLUTION INTRAMUSCULAR; INTRAVENOUS AS NEEDED
Status: DISCONTINUED | OUTPATIENT
Start: 2024-07-16 | End: 2024-07-16

## 2024-07-16 RX ORDER — FENTANYL/ROPIVACAINE/NS/PF 2MCG/ML-.2
0-25 PLASTIC BAG, INJECTION (ML) INJECTION CONTINUOUS
Status: DISCONTINUED | OUTPATIENT
Start: 2024-07-16 | End: 2024-07-16

## 2024-07-16 RX ORDER — OXYTOCIN/0.9 % SODIUM CHLORIDE 30/500 ML
2-30 PLASTIC BAG, INJECTION (ML) INTRAVENOUS CONTINUOUS
Status: DISCONTINUED | OUTPATIENT
Start: 2024-07-16 | End: 2024-07-17

## 2024-07-16 RX ORDER — MISOPROSTOL 200 UG/1
800 TABLET ORAL ONCE AS NEEDED
Status: COMPLETED | OUTPATIENT
Start: 2024-07-16 | End: 2024-07-16

## 2024-07-16 RX ORDER — OXYTOCIN/0.9 % SODIUM CHLORIDE 30/500 ML
60 PLASTIC BAG, INJECTION (ML) INTRAVENOUS ONCE AS NEEDED
Status: DISCONTINUED | OUTPATIENT
Start: 2024-07-16 | End: 2024-07-17

## 2024-07-16 RX ORDER — MORPHINE SULFATE 0.5 MG/ML
INJECTION, SOLUTION EPIDURAL; INTRATHECAL; INTRAVENOUS AS NEEDED
Status: DISCONTINUED | OUTPATIENT
Start: 2024-07-16 | End: 2024-07-16

## 2024-07-16 RX ORDER — AZITHROMYCIN 100 MG/ML
INJECTION, POWDER, LYOPHILIZED, FOR SOLUTION INTRAVENOUS AS NEEDED
Status: DISCONTINUED | OUTPATIENT
Start: 2024-07-16 | End: 2024-07-16

## 2024-07-16 RX ORDER — CARBOPROST TROMETHAMINE 250 UG/ML
250 INJECTION, SOLUTION INTRAMUSCULAR ONCE AS NEEDED
Status: DISCONTINUED | OUTPATIENT
Start: 2024-07-16 | End: 2024-07-17

## 2024-07-16 RX ORDER — PHENYLEPHRINE 10 MG/250 ML(40 MCG/ML)IN 0.9 % SOD.CHLORIDE INTRAVENOUS
CONTINUOUS PRN
Status: DISCONTINUED | OUTPATIENT
Start: 2024-07-16 | End: 2024-07-16

## 2024-07-16 RX ORDER — HYDROMORPHONE HYDROCHLORIDE 1 MG/ML
0.2 INJECTION, SOLUTION INTRAMUSCULAR; INTRAVENOUS; SUBCUTANEOUS EVERY 5 MIN PRN
Status: DISCONTINUED | OUTPATIENT
Start: 2024-07-16 | End: 2024-07-17

## 2024-07-16 RX ORDER — DEXMEDETOMIDINE IN 0.9 % NACL 20 MCG/5ML
SYRINGE (ML) INTRAVENOUS AS NEEDED
Status: DISCONTINUED | OUTPATIENT
Start: 2024-07-16 | End: 2024-07-16

## 2024-07-16 RX ORDER — LOPERAMIDE HYDROCHLORIDE 2 MG/1
4 CAPSULE ORAL EVERY 2 HOUR PRN
Status: DISCONTINUED | OUTPATIENT
Start: 2024-07-16 | End: 2024-07-17

## 2024-07-16 SDOH — HEALTH STABILITY: MENTAL HEALTH: CURRENT SMOKER: 0

## 2024-07-16 ASSESSMENT — PAIN SCALES - GENERAL
PAINLEVEL_OUTOF10: 0 - NO PAIN
PAIN_LEVEL: 5
PAINLEVEL_OUTOF10: 9
PAINLEVEL_OUTOF10: 5 - MODERATE PAIN
PAINLEVEL_OUTOF10: 0 - NO PAIN
PAINLEVEL_OUTOF10: 9
PAINLEVEL_OUTOF10: 0 - NO PAIN
PAINLEVEL_OUTOF10: 0 - NO PAIN
PAINLEVEL_OUTOF10: 5 - MODERATE PAIN
PAINLEVEL_OUTOF10: 6
PAINLEVEL_OUTOF10: 0 - NO PAIN
PAINLEVEL_OUTOF10: 0 - NO PAIN
PAINLEVEL_OUTOF10: 3
PAINLEVEL_OUTOF10: 0 - NO PAIN

## 2024-07-16 ASSESSMENT — PAIN DESCRIPTION - DESCRIPTORS
DESCRIPTORS: SHARP

## 2024-07-16 ASSESSMENT — PAIN DESCRIPTION - LOCATION
LOCATION: INCISION
LOCATION: INCISION

## 2024-07-16 NOTE — CARE PLAN
The patient's goals for the shift include  healthy mom healthy baby    The clinical goals for the shift include augmentation of labor    Over the shift, the patient made progress towards he above goals. Vitals and assessments WNL.       Problem: Vaginal Birth or  Section  Goal: Fetal and maternal status remain reassuring during the birth process  Outcome: Progressing  Goal: Demonstrates labor coping techniques through delivery  Outcome: Progressing  Goal: No s/sx of infection through recovery  Outcome: Progressing  Goal: No s/sx of hemorrhage through recovery  Outcome: Progressing

## 2024-07-16 NOTE — ANESTHESIA PREPROCEDURE EVALUATION
Patient: Babs Brown    Evaluation Method: In-person visit    Procedure Information    Date: 24  Procedure: Labor Consult      39wk6d arrived in Labor, TOLAC. Primary section for fetal distress, planning NCB this pregnancy. H/o migraines, ADHD. Will accept blood products. Denies problems with anesthesia.    Relevant Problems   Endocrine   (+) Obesity in pregnancy ([facility])      Hematology   (+) Anemia affecting pregnancy in third trimester ([facility])       Clinical information reviewed:   Tobacco  Allergies  Meds  Problems  Med Hx  Surg Hx   Fam Hx  Soc   Hx        NPO Detail:  No data recorded     OB/Gyn Evaluation    Present Pregnancy    Patient is pregnant now.  (+) , previous  section - primary   Obstetric History                Physical Exam    Airway  Mallampati: II  TM distance: >3 FB  Neck ROM: full     Cardiovascular - normal exam  Rhythm: regular  Rate: normal     Dental    Pulmonary - normal exam     Abdominal - normal exam           Anesthesia Plan    History of general anesthesia?: yes  History of complications of general anesthesia?: no    ASA 3     other   (Desires NCB with epidural prn)  The patient is not a current smoker.    Anesthetic plan and risks discussed with patient.  Use of blood products discussed with patient who consented to blood products.    Plan discussed with CRNA.

## 2024-07-16 NOTE — PROGRESS NOTES
S: Pt is comfortable with epidural in place.   at bedside providing support.     O: Cervical Exam: deferred   Presentation: vtx  FHR     Baseline: 120s     Variability: moderate      Accels:present     Decels: occasional, quick variable     Category: reassuring CAT 2  TOCO: contractions every 2-4 min; Pitocin at 8mu/min  Membrane status: AROM x 17 hrs      Color of fluid: clear    A:  IUP @ 40.1  GBS neg  Category 2 tracing  Labor phase: latent     P:  Continue labor support  Frequent position changes   Augmentation with pitocin per protocol  Anticipate    Dr. Martinez aware of patient status and plan of care.     KUMAR Phillips-EDDIE

## 2024-07-16 NOTE — PROGRESS NOTES
EDDIE LABOR PROGRESS NOTE    Subjective:    Patient is doing well with contractions.   Partner is supportive and present at the bedside.    Contraction pain is moderate, but denies need for pain intervention at this time.   Epidural:  Planning NCB    Objective:   Visit Vitals  /57   Pulse 89   Temp 36.8 °C (98.3 °F) (Oral)   Resp 18       FHR:      Baseline 135  Variability; moderate  Accels; Reactive  Decels none                                      Contraction Frequency:  regular, every 1-5 minutes    Cervical Exam: deferred    Membranes:  are AROM for 5 hours-clear     Pitocin:  None     Assessment:  Babs VANI Brown is a 24 y.o.  at 40w0d  TOLAC  Latent Labor  Fetal Heart Rate Category 1 - overall reassuring  GBS: Negative    Plan:  Consider pitocin AOL if no cervical change with next exam  Activity as tolerated  Clear liquids  Encouraged to rest  Frequent position changes  Continuous fetal monitoring  Re-evaluate in 2 hours or as needed  Expectant management at this time  Anticipate active labor  Reviewed with Dr Rubio who agreed with plan of care    Electronically signed by NELL Chairez on 7/15/2024 at 8:01 PM

## 2024-07-16 NOTE — SIGNIFICANT EVENT
Called to patient room for pressure and feeling that she needs to push. Upon exam, large amount of dark red bleeding noted with clots. Kirill Martinez and Precious called to bedside and decision was made for urgent .   FHR: 120's with moderate variability, +accelerations and occasional quick variable decels. CAT 2 FHR tracing   Ctxs every 2 minutes; Pitocin at 14 mu/min; turned off when bleeding noted   SVE per Dr. Martinez is complete but fetal head remains high  Dr. Lang to proceed with     KUMAR Phillips-EDDIE

## 2024-07-16 NOTE — SIGNIFICANT EVENT
Cervical exam per patient request is 6/80/-3 with head poorly applied to cervix and in abdomen rather than pelvis. Bedside ultrasound confirms that head is not in pelvis but rather in lower left quadrant of abdomen. Pitocin turned off and patient informed of findings of exam. Will have Dr. Martinez evaluate.   FHR: 130's with moderate variability and + acccelerations, no decels; CAT 1 FHR tracing  Pitocin at 16 mu/min prior to turning off  Contractions every 2-3 minutes    KUMAR Phillips-EDDIE

## 2024-07-16 NOTE — ANESTHESIA PROCEDURE NOTES
Epidural Block    Patient location during procedure: OB  Start time: 7/16/2024 3:39 AM  End time: 7/16/2024 3:58 AM  Reason for block: labor analgesia  Staffing  Performed: CRNA and SRNA   Authorized by: EMILIO Fallon    Performed by: EMILIO Fallon    Preanesthetic Checklist  Completed: patient identified, IV checked, risks and benefits discussed, surgical consent, pre-op evaluation, timeout performed and sterile techniques followed  Block Timeout  RN/Licensed healthcare professional reads aloud to the Anesthesia provider and entire team: Patient identity, procedure with side and site, patient position, and as applicable the availability of implants/special equipment/special requirements.  Patient on coagulant treatment: no  Timeout performed at: 7/16/2024 3:39 AM  Block Placement  Patient position: sitting  Prep: ChloraPrep  Sterility prep: drape, gown, mask, gloves, cap and hand  Sedation level: no sedation  Patient monitoring: blood pressure, continuous pulse oximetry and heart rate  Approach: midline  Local numbing: lidocaine 1% to skin and subcutaneous tissues  Vertebral space: lumbar  Lumbar location: L3-L4  Epidural  Loss of resistance technique: saline  Guidance: landmark technique        Needle  Needle type: Tuohy   Needle gauge: 17  Needle length: 8.9cm  Needle insertion depth: 6 cm  Catheter type: multi-orifice  Catheter size: 19 G  Catheter at skin depth: 11 cm  Catheter securement method: clear occlusive dressing and liquid medical adhesive    Test dose: lidocaine 1.5% with epinephrine 1-to-200,000  Test dose: lidocaine 1.5% with epinephrine 1-to-200,000  Test dose result: no positive test dose    PCEA  Medication concentration used: 0.2% Ropivacaine with 2 mcg/mL Fentanyl  Dose (mL): 5  Lockout (minutes): 30  1-Hour Limit (boluses/hr): 25  Basal Rate: 8        Assessment  Sensory level: T10 (Bilaterl T 8 level to pinprick) bilateral  Block outcome: patient  comfortable  Number of attempts: 1  Events: no positive test dose  Procedure assessment: patient tolerated procedure well with no immediate complications  Additional Notes  Pt acknowledges that  the risks and benefits of the epidural placement has been explained, no questions. Attempt x 1 by SRNA, pt feeling more to left, CRNA placed epidural with no difficulty.   0354: 2ml of 1.5% Lidocaine with 1:200 K Epi.   Pt in MICHAEL after epidural placement.

## 2024-07-16 NOTE — SIGNIFICANT EVENT
Called to room to evaluate position.    FHTs: 130s, moderate variability, +accels, no decels  TOCO: q2-4min  Cervix:  Prior to check, bladder was emptied for large amount of urine  6/80/-1 cephalic  BSUS confirms cephalic  Discussed with patient and family.  Okay to resume pitocin, active labor.

## 2024-07-16 NOTE — PROGRESS NOTES
S: Pt is coping well with ctxs.  Family at bedside providing support.    O: Cervical Exam: unchanged - 5/80/-2 with head not well applied to cervix; full bladder palpable; 400cc urine from straight cath by RN  Presentation: vtx  FHR     Baseline: 120s      Variability: moderate     Accels: present     Decels: none     Category: CAT 1   TOCO: contractions every 2-4 min; pitocin at 12mu/min  Membrane status     Color of fluid: clear     A:  IUP @ 40.1  GBS neg   Category 1 tracing  Labor phase: latent     P:  Continue labor support  Frequent position changes  Continue augmentation with pitocin per protocol   Anticipate    Dr. Martinez aware of patient status and plan of care.     KUMAR Phillips-EDDIE

## 2024-07-16 NOTE — PROGRESS NOTES
CNM LABOR PROGRESS NOTE    Subjective:    Patient is doing well with contractions. Resting comfortably with no increase in ctxn frequency or pain. Again discussed pitocin AOL. Pt is agreeable to augmentation at this time.   Partner is supportive and present at the bedside.    Contraction pain is mild, denies need for pain intervention at this time.   Epidural:  planning NCB    Objective:   Visit Vitals  /62   Pulse 92   Temp (!) 35.7 °C (96.3 °F) (Temporal)   Resp 18       FHR:      Baseline 125  Variability; mod  Accels; Reactive  Decels none                                      Contraction Frequency:  irregular, every 1-4 minutes    Cervical Exam: deferred r/t no increase in ctxn discomfort and AROM for 10 hours     Membranes:  are AROM for 10 hours-clear/odorless/afebrile     Pitocin:  Yes   will begin pitocin AOL per protocol     Assessment:  Babs Brown is a 24 y.o.  at 40w0d  TOLAC  Latent Labor  Fetal Heart Rate Category 1 - overall reassuring  GBS: Negative     Plan:  Begin pitocin AOL per protocol   Activity as tolerated  Clear liquids  Encouraged to rest  Frequent position changes  Continuous fetal monitoring  Re-evaluate in 2 hours or as needed  Anticipate active labor  Reviewed with Dr Rubio who agreed with plan of care     Electronically signed by NELL Chairez on 2024 at 1:35 AM

## 2024-07-16 NOTE — PROGRESS NOTES
[facility] LABOR PROGRESS NOTE    Subjective:    Patient is doing well with contractions. Denies increasing ctxn discomfort  Partner and  supportive and present at the bedside.    Contraction pain is mild, denies need for pain intervention. Requests Reglan and Benadryl for rest.   Epidural:  planning NCB    Objective:   Visit Vitals  /62   Pulse 78   Temp 36.1 °C (97 °F)   Resp 18       FHR:      Baseline 130  Variability; moderate  Accels: present  Decels none                                      Contraction Frequency:  irregular, every 1-9 minutes    Cervical Exam: Deferred r/t AROM for 7 hours-remains clear and odorless     Membranes:  are AROM for 7 hours     Pitocin:  Discussed pitocin AOL given ROM with no cervical change or increase in ctxn intensity. R/B/A extensively discussed. Pt and spouse decline at this time. Request Benadryl for rest and will reeval in 2 hours.     Assessment:  Babs Brown is a 24 y.o.  at 40w0d  TOLAC  Latent Labor  Fetal Heart Rate Category 1 - overall reassuring  GBS: Negative     Plan:  Consider pitocin AOL if no cervical change with next exam: pt declines at this time   Activity as tolerated  Clear liquids  Encouraged to rest  Frequent position changes  Continuous fetal monitoring  Re-evaluate in 2 hours or as needed  Expectant management at this time  Anticipate active labor  Reviewed with Dr Rubio who agreed with plan of care    Electronically signed by NELL Chairez on 7/15/2024 at 10:35 PM     Physical Therapy     Referred by: Yasmin Richardson DPM; Medical Diagnosis (from order):    Diagnosis Information      Diagnosis    729.5 (ICD-9-CM) - M79.672 (ICD-10-CM) - Left foot pain                Initial Evaluation    Visit:  1   Treatment Diagnosis: left: foot, symptoms with increased pain/symptoms, impaired range of motion, impaired muscle length/flexibility, impaired strength, impaired activity tolerance, impaired body mechanics, impaired gait, impaired balance  Date of onset: Around 2012 or 2013  Chart reviewed at time of initial evaluation (relevant co-morbidities, allergies, tests and medications listed): Current use of beta blocker  Maxillary sinusitis, chronic  Allergic rhinoconjunctivitis  GERD (gastroesophageal reflux disease)  Dyskinesia of esophagus  Sphincter of Oddi spasm  Anxiety  Arthritis  Left hip pain  Plantar fasciitis of left foot  Tension headache  Moderate persistent asthma without complication  Multiple nevi- back, abdomen, arms  Obstructive sleep apnea (adult) (pediatric)    SUBJECTIVE                                                                                                               Pt reports she has been dealing with pain in her foot for a long time. She has dealt with plantar fasciitis and even had surgery for it. She continues to have pain now but the pain is in the center of the heel as well as on the lateral aspect of the foot. She would like to try ultrasounf therapy at this time to manage symptoms.   Pain / Symptoms:  Pain rating (out of 10): Current: 5 ; Best: 4; Worst: 7  Location: Left foot on the lateral aspect and into the plantar fascia   Quality / Description: ache, sore, sharp, discomfort.  Alleviating Factors: avoiding movement in involved area, rest.   Progression since onset: improved  Function:   Limitations / Exacerbation Factors: pain, difficulty, increased time, house/yard work, grocery shopping, standing tasks, bending/squatting/lifting, standing and  walking, community distances, stairs, walking quickly as required to cross a street/exit a building rapidly  Prior Level of Function: declining function, therefore referred to therapy,  Patient Goals: decreased pain, increased motion, increased strength and independence with ADLs/IADLs    Prior treatment: no therapies  Discharged from hospital, home health, or skilled nursing facility in last 30 days: no    Home Environment:   Patient lives with significant other  Assistance available: as needed  Feels safe at home/work/school.  2 or more falls or an unexplained fall with injury in the last year:  No    OBJECTIVE                                                                                                                     Range of Motion (ROM)   (degrees unless noted; active unless noted; norms in ( ); negative=lacking to 0, positive=beyond 0)   WFL: RLE, LLE    Strength  (out of 5 unless noted, standard test position unless noted, lbs tested with hand held dynamometer)   WFL: LLE, RLE      Muscle Flexibility:   - Plantar Flexors: Left: moderate limitation Right: moderate limitation       Outcome Measures:   Lower Extremity Functional Scale: LEFS Calculated Total: 47 (0=extreme difficulty; 80=no difficulty) see flowsheet for additional documentation    TREATMENT                                                                                                                initial evaluation completed    Therapeutic Activity:  PT demonstrated HEP exercises to pt who demonstrated back to PT to check for proper form.      Skilled input: verbal instruction/cues, tactile instruction/cues, posture correction, facilitation and as detailed above    Writer verbally educated and received verbal consent for hand placement, positioning of patient, and techniques to be performed today from patient for therapist position for techniques and hand placement and palpation for techniques as described above and how they are  pertinent to the patient's plan of care.    Home Exercise Program/Education Materials: Access Code: [email or web address]  URL: [email or web address]  Date: 03/02/2022  Prepared by: Romulo Meadows    Exercises  Standing Soleus Stretch on Step with Counter Support - 2 x daily - 7 x weekly - 2 reps - 30 sec hold  Standing Gastroc Stretch on Step with Counter Support - 2 x daily - 7 x weekly - 2 reps - 30 sec hold  Standing Toe Dorsiflexion Stretch - 2 x daily - 7 x weekly - 2 reps - 30-60 sec hold       ASSESSMENT                                                                                                           47 year old female patient has reported functional limitations listed above impacted by signs and symptoms consistent with below   • Involved:       - left foot   • Symptoms/impairments: increased pain/symptoms, impaired range of motion, impaired muscle length/flexibility, impaired strength, impaired activity tolerance, impaired body mechanics, impaired gait and impaired balance  Pain/symptoms after session (out of 10): 5  Prognosis: patient will benefit from skilled therapy  Rehabilitative potential is: good  Predicted patient presentation: Low (stable) - Patient comorbidities and complexities, as defined above, will have little effect on progress for prescribed plan of care.  Patient Education:   Who will be receiving education: patient  Are they ready to learn: yes  Preferred learning style: written, verbal, demonstration and video  Barriers to learning: no barriers apparent at this time  Results of above outlined education: Demonstrates understanding and Verbalizes understanding      PLAN                                                                                                                         The following skilled interventions to be implemented to achieve goals listed below:Manual Therapy (10757)  Neuromuscular Re-Education (31052)  Therapeutic Activity (07638)  Therapeutic  Exercise (04335)  Electrical Stimulation (unattended, 19049 or )  Iontophoresis (06441)  Ultrasound/Phonophoresis (01967)Iontophoresis Details: dexamethasone sodium phosphate, 4mg/ml and up to 6 applications  Frequency / Duration: 1 times per week tapering as patient progresses for an estimated total of 12 visits for 12 weeks    Patient involved in and agreed to plan of care and goals.  Patient given attendance policy at time of initial evaluation.  Suggestions for next session as indicated: Progress per plan of care      GOALS                                                                                                                           Long Term Goals: to be met by end of plan of care  1. Patient will demonstrate ability to negotiate level and unlevel surfaces at variable velocities, including change of direction without increased pain or instability to return to age appropriate and community activities at prior level of function.  2. Patient will stand for 60 minutes for grocery shopping/lifestyle errands  3. Lower Extremity Functional Scale: Patient will complete form to reflect an improved raw score to greater than or equal to 56/80 to indicate patient reported improvement in function/disability/impairment (minimal detectable change: 9 points).  4. Patient will be independent with progressed and modified home exercise program.      Therapy procedure time and total treatment time can be found documented on the Time Entry flowsheet

## 2024-07-16 NOTE — PROGRESS NOTES
[facility] LAB[facility] PROGRESS NOTE    Subjective:    Patient is doing well with contractions.   Partner is supportive and present at the bedside.    Contraction pain is none, requests epidural prior to pitocin initiation.   Epidural:  yes-planning     Objective:   Visit Vitals  BP 92/52   Pulse (!) 117   Temp 36.2 °C (97.2 °F) (Temporal)   Resp 18       FHR:      Baseline 125  Variability; mod  Accels; Reactive  Decels: None                                      Contraction Frequency:  irregular, every 5-10 minutes    Cervical Exam: Deferred    Membranes:  are AROM for 12 hours. Remains clear/odorless, afebrile     Pitocin:  Yes  will initiate per protocol following epidural placement per pt request     Assessment:  Babs Brown is a 24 y.o.  at 40w0d  TOLAC  Latent Labor  Fetal Heart Rate Category 1 - overall reassuring  GBS: Negative     Plan:  Begin pitocin AOL per protocol following epidural placement   Activity as tolerated  Clear liquids  Encouraged to rest  Frequent position changes  Continuous fetal monitoring  Re-evaluate in 2 hours or as needed  Anticipate active labor  Reviewed with Dr Rubio who agreed with plan of care    Electronically signed by NELL Chairez on 2024 at 3:48 AM

## 2024-07-16 NOTE — PROGRESS NOTES
S: Patient feeling pressure from catheter and would like for it to be removed. Complains of constant pressure in her pelvis and consents to cervical exam. Tearful and complains of pain with contractions.     O: SVE: 6/80/-2 with head better applied to cervix       FHR: 120s with moderate variability and good accelerations, occasional variable decels; reassuring CAT 2 FHR tracing   Contractions every 2-3 minutes; pitocin at 12mu/min     A: IUP 40.1 weeks       TOLAC       Labor augmentation      Active labor     P: Anesthesia called to patient's room for evaluation of pain management  Fong catheter removed per patient request  Continue pitocin augmentation per protocol  Dr. Martinez updated on patient status and plan of care    KUMAR Phillips-EDDIE

## 2024-07-16 NOTE — SIGNIFICANT EVENT
Called to patient's room to answer questions. Patient, her fiance and her  have questions about proceeding with  secondary to patient feeling exhausted and not sure if she wants to continue to labor. Discussed that c/s is not considered necessary at this time secondary to CAT 1 FHR tracing and no s/s of infection. Patient can decide to proceed with  at any time if she does not wish to continue laboring. Will consider and discuss with partner and let us know if she has questions.     Nia Willett, KUMAR-EDDIE

## 2024-07-16 NOTE — PROGRESS NOTES
[facility] LAB[facility] PROGRESS NOTE    Subjective:    Patient is doing well with contractions.   Partner is supportive and present at the bedside.    Contraction pain is none, epidural in place.   Epidural:  yes    Objective:   Visit Vitals  /62   Pulse (!) 134   Temp (!) 35.9 °C (96.6 °F)   Resp 18       FHR:      Baseline 120  Variability; mod  Accels; Reactive  Decels none                                      Contraction Frequency:  regular, every 2-4 minutes    Cervical Exam: Deferred    Membranes:  are AROM for 14 hours, remains clear/odorless/afebrile    Pitocin:  Yes  2milliunits/minute    Assessment:  Babs Brown is a 24 y.o.  at 40w0d  TOLAC  Latent Labor  Fetal Heart Rate Category 1 - overall reassuring  GBS: Negative  AROM for 14 hours-AOL: pitocin     Plan:  Cont pitocin AOL per protocol  Clear liquids  Encouraged to rest  Frequent position changes  Continuous fetal monitoring  Re-evaluate in 2 hours or as needed  Anticipate active labor  Reviewed with Dr Rubio who agreed with plan of care  Electronically signed by NELL Chairez on 2024 at 5:34 AM

## 2024-07-17 LAB
ERYTHROCYTE [DISTWIDTH] IN BLOOD BY AUTOMATED COUNT: 17.9 % (ref 11.5–14.5)
FIBRINOGEN PPP-MCNC: 282 MG/DL (ref 200–400)
HCT VFR BLD AUTO: 26.7 % (ref 36–46)
HGB BLD-MCNC: 9.1 G/DL (ref 12–16)
MCH RBC QN AUTO: 29.4 PG (ref 26–34)
MCHC RBC AUTO-ENTMCNC: 34.1 G/DL (ref 32–36)
MCV RBC AUTO: 86 FL (ref 80–100)
NRBC BLD-RTO: 0 /100 WBCS (ref 0–0)
PLATELET # BLD AUTO: 183 X10*3/UL (ref 150–450)
RBC # BLD AUTO: 3.09 X10*6/UL (ref 4–5.2)
WBC # BLD AUTO: 12.4 X10*3/UL (ref 4.4–11.3)

## 2024-07-17 PROCEDURE — 2500000004 HC RX 250 GENERAL PHARMACY W/ HCPCS (ALT 636 FOR OP/ED): Mod: JZ | Performed by: OBSTETRICS & GYNECOLOGY

## 2024-07-17 PROCEDURE — 1100000001 HC PRIVATE ROOM DAILY

## 2024-07-17 PROCEDURE — 2500000004 HC RX 250 GENERAL PHARMACY W/ HCPCS (ALT 636 FOR OP/ED): Performed by: OBSTETRICS & GYNECOLOGY

## 2024-07-17 PROCEDURE — 2500000005 HC RX 250 GENERAL PHARMACY W/O HCPCS: Performed by: OBSTETRICS & GYNECOLOGY

## 2024-07-17 PROCEDURE — 7100000016 HC LABOR RECOVERY PER HOUR

## 2024-07-17 PROCEDURE — 2500000001 HC RX 250 WO HCPCS SELF ADMINISTERED DRUGS (ALT 637 FOR MEDICARE OP): Performed by: OBSTETRICS & GYNECOLOGY

## 2024-07-17 PROCEDURE — 85027 COMPLETE CBC AUTOMATED: CPT | Performed by: OBSTETRICS & GYNECOLOGY

## 2024-07-17 RX ORDER — HYDRALAZINE HYDROCHLORIDE 20 MG/ML
5 INJECTION INTRAMUSCULAR; INTRAVENOUS ONCE AS NEEDED
Status: DISCONTINUED | OUTPATIENT
Start: 2024-07-17 | End: 2024-07-19 | Stop reason: HOSPADM

## 2024-07-17 RX ORDER — BISACODYL 10 MG/1
10 SUPPOSITORY RECTAL DAILY PRN
Status: DISCONTINUED | OUTPATIENT
Start: 2024-07-17 | End: 2024-07-19 | Stop reason: HOSPADM

## 2024-07-17 RX ORDER — CARBOPROST TROMETHAMINE 250 UG/ML
250 INJECTION, SOLUTION INTRAMUSCULAR ONCE AS NEEDED
Status: DISCONTINUED | OUTPATIENT
Start: 2024-07-17 | End: 2024-07-19 | Stop reason: HOSPADM

## 2024-07-17 RX ORDER — TRANEXAMIC ACID 100 MG/ML
1000 INJECTION, SOLUTION INTRAVENOUS ONCE AS NEEDED
Status: DISCONTINUED | OUTPATIENT
Start: 2024-07-17 | End: 2024-07-19 | Stop reason: HOSPADM

## 2024-07-17 RX ORDER — HYDROMORPHONE HYDROCHLORIDE 1 MG/ML
0.2 INJECTION, SOLUTION INTRAMUSCULAR; INTRAVENOUS; SUBCUTANEOUS EVERY 5 MIN PRN
Status: DISCONTINUED | OUTPATIENT
Start: 2024-07-17 | End: 2024-07-19 | Stop reason: HOSPADM

## 2024-07-17 RX ORDER — NALOXONE HYDROCHLORIDE 0.4 MG/ML
0.1 INJECTION, SOLUTION INTRAMUSCULAR; INTRAVENOUS; SUBCUTANEOUS EVERY 5 MIN PRN
Status: DISCONTINUED | OUTPATIENT
Start: 2024-07-17 | End: 2024-07-19 | Stop reason: HOSPADM

## 2024-07-17 RX ORDER — METHYLERGONOVINE MALEATE 0.2 MG/ML
0.2 INJECTION INTRAVENOUS ONCE AS NEEDED
Status: DISCONTINUED | OUTPATIENT
Start: 2024-07-17 | End: 2024-07-19 | Stop reason: HOSPADM

## 2024-07-17 RX ORDER — OXYTOCIN/0.9 % SODIUM CHLORIDE 30/500 ML
60 PLASTIC BAG, INJECTION (ML) INTRAVENOUS ONCE AS NEEDED
Status: DISCONTINUED | OUTPATIENT
Start: 2024-07-17 | End: 2024-07-19 | Stop reason: HOSPADM

## 2024-07-17 RX ORDER — METOCLOPRAMIDE HYDROCHLORIDE 5 MG/ML
10 INJECTION INTRAMUSCULAR; INTRAVENOUS EVERY 6 HOURS PRN
Status: DISCONTINUED | OUTPATIENT
Start: 2024-07-17 | End: 2024-07-19 | Stop reason: HOSPADM

## 2024-07-17 RX ORDER — KETOROLAC TROMETHAMINE 30 MG/ML
30 INJECTION, SOLUTION INTRAMUSCULAR; INTRAVENOUS EVERY 6 HOURS
Status: COMPLETED | OUTPATIENT
Start: 2024-07-17 | End: 2024-07-17

## 2024-07-17 RX ORDER — OXYCODONE HYDROCHLORIDE 5 MG/1
5 TABLET ORAL EVERY 4 HOURS PRN
Status: DISCONTINUED | OUTPATIENT
Start: 2024-07-17 | End: 2024-07-19 | Stop reason: HOSPADM

## 2024-07-17 RX ORDER — ONDANSETRON HYDROCHLORIDE 2 MG/ML
4 INJECTION, SOLUTION INTRAVENOUS EVERY 6 HOURS PRN
Status: DISCONTINUED | OUTPATIENT
Start: 2024-07-17 | End: 2024-07-19 | Stop reason: HOSPADM

## 2024-07-17 RX ORDER — ENOXAPARIN SODIUM 100 MG/ML
40 INJECTION SUBCUTANEOUS EVERY 24 HOURS
Status: DISCONTINUED | OUTPATIENT
Start: 2024-07-17 | End: 2024-07-19 | Stop reason: HOSPADM

## 2024-07-17 RX ORDER — OXYCODONE HYDROCHLORIDE 5 MG/1
10 TABLET ORAL EVERY 4 HOURS PRN
Status: DISCONTINUED | OUTPATIENT
Start: 2024-07-17 | End: 2024-07-19 | Stop reason: HOSPADM

## 2024-07-17 RX ORDER — METOCLOPRAMIDE 10 MG/1
10 TABLET ORAL EVERY 6 HOURS PRN
Status: DISCONTINUED | OUTPATIENT
Start: 2024-07-17 | End: 2024-07-19 | Stop reason: HOSPADM

## 2024-07-17 RX ORDER — IBUPROFEN 600 MG/1
600 TABLET ORAL EVERY 6 HOURS
Status: DISCONTINUED | OUTPATIENT
Start: 2024-07-17 | End: 2024-07-19 | Stop reason: HOSPADM

## 2024-07-17 RX ORDER — POLYETHYLENE GLYCOL 3350 17 G/17G
17 POWDER, FOR SOLUTION ORAL 2 TIMES DAILY PRN
Status: DISCONTINUED | OUTPATIENT
Start: 2024-07-17 | End: 2024-07-19 | Stop reason: HOSPADM

## 2024-07-17 RX ORDER — LABETALOL HYDROCHLORIDE 5 MG/ML
20 INJECTION, SOLUTION INTRAVENOUS ONCE AS NEEDED
Status: DISCONTINUED | OUTPATIENT
Start: 2024-07-17 | End: 2024-07-19 | Stop reason: HOSPADM

## 2024-07-17 RX ORDER — LIDOCAINE 560 MG/1
1 PATCH PERCUTANEOUS; TOPICAL; TRANSDERMAL
Status: DISCONTINUED | OUTPATIENT
Start: 2024-07-17 | End: 2024-07-19 | Stop reason: HOSPADM

## 2024-07-17 RX ORDER — LOPERAMIDE HYDROCHLORIDE 2 MG/1
4 CAPSULE ORAL EVERY 2 HOUR PRN
Status: DISCONTINUED | OUTPATIENT
Start: 2024-07-17 | End: 2024-07-19 | Stop reason: HOSPADM

## 2024-07-17 RX ORDER — OXYTOCIN 10 [USP'U]/ML
10 INJECTION, SOLUTION INTRAMUSCULAR; INTRAVENOUS ONCE AS NEEDED
Status: DISCONTINUED | OUTPATIENT
Start: 2024-07-17 | End: 2024-07-19 | Stop reason: HOSPADM

## 2024-07-17 RX ORDER — ACETAMINOPHEN 325 MG/1
975 TABLET ORAL EVERY 6 HOURS SCHEDULED
Status: DISCONTINUED | OUTPATIENT
Start: 2024-07-17 | End: 2024-07-19 | Stop reason: HOSPADM

## 2024-07-17 RX ORDER — ONDANSETRON 4 MG/1
4 TABLET, FILM COATED ORAL EVERY 6 HOURS PRN
Status: DISCONTINUED | OUTPATIENT
Start: 2024-07-17 | End: 2024-07-19 | Stop reason: HOSPADM

## 2024-07-17 RX ORDER — DIPHENHYDRAMINE HYDROCHLORIDE 50 MG/ML
25 INJECTION INTRAMUSCULAR; INTRAVENOUS EVERY 4 HOURS PRN
Status: DISCONTINUED | OUTPATIENT
Start: 2024-07-17 | End: 2024-07-19 | Stop reason: HOSPADM

## 2024-07-17 RX ORDER — DIPHENHYDRAMINE HCL 25 MG
25 CAPSULE ORAL EVERY 4 HOURS PRN
Status: DISCONTINUED | OUTPATIENT
Start: 2024-07-17 | End: 2024-07-19 | Stop reason: HOSPADM

## 2024-07-17 RX ORDER — ADHESIVE BANDAGE
10 BANDAGE TOPICAL
Status: DISCONTINUED | OUTPATIENT
Start: 2024-07-17 | End: 2024-07-19 | Stop reason: HOSPADM

## 2024-07-17 RX ORDER — MISOPROSTOL 200 UG/1
800 TABLET ORAL ONCE AS NEEDED
Status: DISCONTINUED | OUTPATIENT
Start: 2024-07-17 | End: 2024-07-19 | Stop reason: HOSPADM

## 2024-07-17 RX ORDER — NIFEDIPINE 10 MG/1
10 CAPSULE ORAL ONCE AS NEEDED
Status: DISCONTINUED | OUTPATIENT
Start: 2024-07-17 | End: 2024-07-19 | Stop reason: HOSPADM

## 2024-07-17 RX ORDER — SIMETHICONE 80 MG
80 TABLET,CHEWABLE ORAL 4 TIMES DAILY PRN
Status: DISCONTINUED | OUTPATIENT
Start: 2024-07-17 | End: 2024-07-19 | Stop reason: HOSPADM

## 2024-07-17 ASSESSMENT — PAIN SCALES - GENERAL
PAINLEVEL_OUTOF10: 6
PAINLEVEL_OUTOF10: 5 - MODERATE PAIN
PAINLEVEL_OUTOF10: 6
PAINLEVEL_OUTOF10: 9
PAINLEVEL_OUTOF10: 5 - MODERATE PAIN
PAINLEVEL_OUTOF10: 9
PAINLEVEL_OUTOF10: 9
PAINLEVEL_OUTOF10: 8
PAINLEVEL_OUTOF10: 10 - WORST POSSIBLE PAIN
PAINLEVEL_OUTOF10: 8
PAINLEVEL_OUTOF10: 8
PAINLEVEL_OUTOF10: 7
PAINLEVEL_OUTOF10: 6
PAINLEVEL_OUTOF10: 5 - MODERATE PAIN
PAINLEVEL_OUTOF10: 5 - MODERATE PAIN
PAINLEVEL_OUTOF10: 9

## 2024-07-17 ASSESSMENT — PAIN DESCRIPTION - LOCATION
LOCATION: ABDOMEN
LOCATION: ABDOMEN
LOCATION: INCISION
LOCATION: COCCYX

## 2024-07-17 ASSESSMENT — PAIN DESCRIPTION - DESCRIPTORS
DESCRIPTORS: BURNING
DESCRIPTORS: SHARP;STABBING
DESCRIPTORS: SHARP

## 2024-07-17 NOTE — SIGNIFICANT EVENT
Called for increase in PP bleeding  Uterus firm, 3 below U  50-75cc clot on pad  Bimanual exam w firm uterus and clot in lower uterine segment -evacuated manually.  No further clot noted.  Probably a total of ~ 150 ml clot total w this event.  Approx 3 hours since delivery, so 2nd dose of methergine given and IV pit bolus  Will continue to assess pp course

## 2024-07-17 NOTE — PROGRESS NOTES
Patient: Babs Brown    Vitals Value Taken Time   BP 99/62 07/17/24 1515   Temp 36.4 °C (97.5 °F) 07/17/24 1515   Pulse 93 07/17/24 1515   Resp 16 07/17/24 1515   SpO2 98 % 07/17/24 1515       [unfilled]  No complications. Pt able to ambulate and void with no issues.

## 2024-07-17 NOTE — ANESTHESIA POSTPROCEDURE EVALUATION
Patient: Babs Brown    Procedure Summary       Date: 24 Room / Location: [facility]    Anesthesia Start: 339 Anesthesia Stop:     Procedure: OBGYN Delivery  Section Diagnosis: (Other (Add Comments))    Surgeons: Alli Lang MD Responsible Provider: EMILIO Quiles    Anesthesia Type: other ASA Status: 3            Anesthesia Type: other    Vitals Value Taken Time   /70 24   Temp 35.6 24   Pulse 92 24   Resp 15 24   SpO2 92 % 24       Anesthesia Post Evaluation    Patient location during evaluation: PACU  Patient participation: complete - patient participated  Level of consciousness: awake and alert  Pain score: 5  Pain management: inadequate  Airway patency: patent  Cardiovascular status: acceptable  Respiratory status: acceptable  Hydration status: acceptable  Postoperative Nausea and Vomiting: none  Comments: Pt given fentanyl in PACU to help get pain under control, it helped. VSS, will cont to monitor. S/p  d/t possible placental abruption.         There were no known notable events for this encounter.

## 2024-07-17 NOTE — PROGRESS NOTES
Postpartum Progress Note    Assessment/Plan   Babs Brown is a 24 y.o., , who delivered at 40w1d gestation and is now postpartum day 1.        Principal Problem:    Previous  delivery affecting pregnancy ([facility])    Pregnancy Problems (from 23 to present)       Problem Noted Resolved    Anemia affecting pregnancy in third trimester ([facility]) 3/4/2024 by Luisa Farah APRN-CNM, APRN-CNP No    Priority:  Medium      Overview Signed 3/4/2024 12:00 PM by Luisa Farah APRN-CNARNIE, APRN-CNP     Start Iron supplement second trimester   25 week check retic count with CBC         Suspected fetal anomaly, antepartum ([facility]) 2024 by Germania Mejia MD No    Priority:  Medium      Rubella non-immune status, antepartum ([facility]) 2023 by Luisa Farah, APRN-CNM, APRN-CNP No    Priority:  Medium      Overview Signed 2024  5:43 PM by Luisa Farah APRN-CNARNIE, APRN-CNP     Vaccinate PP         Maternal varicella, non-immune ([facility]) 2023 by Luisa Farah, APRN-CNM, APRN-CNP No    Priority:  Medium      Overview Signed 2024  5:43 PM by Luisa Farah APRN-CNARNIE, APRN-CNP     Vaccinate PP         Obesity in pregnancy ([facility]) 2023 by Luisa Farah APRN-CNARNIE, APRN-CNP No    Priority:  Medium      Multigravida in second trimester ([facility]) 2024 by Luisa Farah APRN-CNARNIE, APRN-CNP 2024 by NELL Bernal          Hospital course: no complications   section  Breastfeeding  Pt is B POS. Rhogam is not indicated.    Subjective   Her pain is moderately controlled with current medications  She is not passing flatus  She is not ambulating well  She is tolerating a Adult diet Regular  She reports no breast or nursing problems  She denies emotional concerns today   Her plan for contraception is none         Objective   Allergies:   Topamax [topiramate]         Last Vitals:  Temp Pulse Resp BP MAP Pulse Ox   36.2 °C (97.2  °F) 90 18 110/62   98 %     Vitals Min/Max Last 24 Hours:  Temp  Min: 35.9 °C (96.6 °F)  Max: 37 °C (98.6 °F)  Pulse  Min: 80  Max: 123  Resp  Min: 16  Max: 19  BP  Min: 97/59  Max: 148/87    Intake/Output:     Intake/Output Summary (Last 24 hours) at 7/17/2024 1239  Last data filed at 7/17/2024 0629  Gross per 24 hour   Intake 1189.87 ml   Output 2556 ml   Net -1366.13 ml       Physical Exam:  General: Examination reveals a well developed, well nourished, female, in no acute distress. She is alert and cooperative.  Lungs: breathing unlabored.  Incision: dressing intact no drainage or surrounding erythema.  Fundus: firm and at umbilicus.  Extremities: no redness or tenderness in the calves or thighs, no edema.  Neurological: alert, oriented, normal speech, no focal findings or movement disorder noted.  Psychological: awake and alert; oriented to person, place, and time.    Lab Data:  Labs in chart were reviewed.

## 2024-07-17 NOTE — CARE PLAN
The patient's goals for the shift include  pain control     The clinical goals for the shift include pain control and work on breastfeeding

## 2024-07-17 NOTE — L&D DELIVERY NOTE
OB Delivery Note  2024  Babs Brown  24 y.o.   , Low Transverse       Gestational Age: 40w1d  /Para:   Quantitative Blood Loss: Admission to Discharge: 0 mL (2024 10:02 PM - 2024  8:16 PM)    Aspen Brown [[id number]]      Labor Events    Rupture date/time: 7/15/2024 1524  Fluid color: Clear  Fluid odor: None  Complications: Other Excessive Bleeding, Abruptio Placenta       Placenta    Placenta delivery date/time:   Placenta removal: Manual removal  Placenta appearance: Intact  Placenta disposition: pathology       Cord    Vessels: 3 vessels  Complications: None  Delayed cord clamping?: Yes  Cord blood disposition: Lab  Gases sent?: Yes  Stem cell collection (by provider): No       Anesthesia    Method: Epidural       Operative Delivery    Forceps attempted?: No  Vacuum extractor attempted?: No       Shoulder Dystocia    Shoulder dystocia present?: No       [place] Delivery    Birth date/time: 2025 19:16:00  Delivery type: , Low Transverse   categorization: repeat   priority: urgent  Indications for : Repeat , Other (Add Comments)  Incision type: low transverse  Complications: Other Excessive Bleeding, Abruptio Placenta       Resuscitation    Method: Suctioning, Tactile stimulation       Apgars    Living status: Living  Apgar Component Scores:  1 min.:  5 min.:  10 min.:  15 min.:  20 min.:    Skin color:  0  1       Heart rate:  2  2       Reflex irritability:  2  2       Muscle tone:  2  2       Respiratory effort:  2  2       Total:  8  9       Apgars assigned by: HUMBLE HEATH       Delivery Providers    Delivering clinician: Alli Lang MD   Provider Role     Delivery Nurse     Nursery Nurse     Resident             Reason for : excessive bleeding during labor c/w abruption  TOLAC     Category: Urgent  Additional Procedures:  none  Anesthesia: epidural  Specimen: placenta to path  Cord  gases sent  Cord bloods obtained    Attending: Alli Lang MD  Assistants: DWIGHT Martinez    Informed Consent:   Pt weas seen urgently as bleeding excessive at 8-9 cm in the setting of TOLAC.  Urgent c/section recommended.  All questions were answered. A pre-operative time out was undertaken, led by the patient, and identified the correct patient, procedure and priyanka-operative concerns    Findings: Normal appearing gravid uterus, fallopian tubes, and ovaries. No significant adhesive disease. No evidence of uterine rupture.      Procedure Details:  The patient was taken back to the OR where she was prepped and draped with a betadine splash in supine position with a left lateral tilt. SCDs were placed for DVT prophylaxis. A cantu catheter was already in place and a vaginal prep performed. Preoperative antibiotics were administered.     A Pfannestiel incision was made. We dissected down to the level of the fascia using sharp dissection. The fascia was incised in the midline and extended laterally sharply. The superior aspect of the fascia was then dissected off the underlying rectus muscles. The rectus muscles were divided  in the midline and the peritoneum was entered to expose the lower uterine segment. A bladder blade held the bladder off the lower uterine segment.    A low transverse uterine incision was created using scalpel. We extended the hysterostomy via gentle cephalocaudad stretch. Amniotic fluid was fouls smelling.  This was communicated w pediatrics after procedure.  The baby was delivered atraumatically. Delayed cord clamping was performed. We then handed the baby off to the waiting nurse. Cord blood was collected. The placenta was expressed, and the uterine cavity cleared of debris.  The uterus was exteriorized.    The hysterotomy was closed with a running interlocking suture of 0-vicryl.  A second layer of 0-vicryl were used tomake the uterine incision hemostatic.  . The hysterotomy was inspected and  hemostatic. There was a small 2cm subserosal hematoma on the superior R aspect of the uterine incision, just under visceral peritoneum, and this was observed and not expanding.  The uterus was placed back into the abdominal cavity. The gutters were cleared of clot and debris. The bladder, peritoneum, and muscle were inspected and noted to be hemostatic and free of injury.  Uterine incision reinspected and hemostatic.   Peritoneum was brought together with a running suture of 3-0 vicryl.  The fascia was closed in a single running layer of 0 PDS. The subcutaneous layer was irrigated, and electrocautery used to obtain hemostasis.  Subcutaneous suture of 3-0 vicryl closed the subcutaneous dead space , and the skin was closed in a running subcuticular suture using 4-O vicryl. A sterile dressing was placed. Rectal Tylenol was given.     The patient tolerated the procedure well. All sponge, lap, and needle counts were correct x 2. The patient was brought back to her room in stable condition.     At completion of procedure, all events were reviewed w the patient and her partner    Alli Lang MD

## 2024-07-18 ENCOUNTER — PHARMACY VISIT (OUTPATIENT)
Dept: PHARMACY | Facility: CLINIC | Age: 24
End: 2024-07-18
Payer: MEDICAID

## 2024-07-18 LAB
BLOOD EXPIRATION DATE: NORMAL
DISPENSE STATUS: NORMAL
PRODUCT BLOOD TYPE: 7300
PRODUCT CODE: NORMAL
UNIT ABO: NORMAL
UNIT NUMBER: NORMAL
UNIT RH: NORMAL
UNIT VOLUME: 350
XM INTEP: NORMAL

## 2024-07-18 PROCEDURE — 1100000001 HC PRIVATE ROOM DAILY

## 2024-07-18 PROCEDURE — 2500000004 HC RX 250 GENERAL PHARMACY W/ HCPCS (ALT 636 FOR OP/ED): Performed by: OBSTETRICS & GYNECOLOGY

## 2024-07-18 PROCEDURE — RXMED WILLOW AMBULATORY MEDICATION CHARGE

## 2024-07-18 PROCEDURE — 2500000001 HC RX 250 WO HCPCS SELF ADMINISTERED DRUGS (ALT 637 FOR MEDICARE OP): Performed by: OBSTETRICS & GYNECOLOGY

## 2024-07-18 PROCEDURE — 2500000005 HC RX 250 GENERAL PHARMACY W/O HCPCS: Performed by: OBSTETRICS & GYNECOLOGY

## 2024-07-18 RX ORDER — IBUPROFEN 600 MG/1
600 TABLET ORAL EVERY 6 HOURS
Qty: 120 TABLET | Refills: 0 | Status: SHIPPED | OUTPATIENT
Start: 2024-07-18

## 2024-07-18 RX ORDER — ACETAMINOPHEN 325 MG/1
975 TABLET ORAL EVERY 6 HOURS PRN
Qty: 120 TABLET | Refills: 0 | Status: SHIPPED | OUTPATIENT
Start: 2024-07-18

## 2024-07-18 RX ORDER — LIDOCAINE 560 MG/1
1 PATCH PERCUTANEOUS; TOPICAL; TRANSDERMAL
Qty: 14 PATCH | Refills: 0 | Status: SHIPPED | OUTPATIENT
Start: 2024-07-18

## 2024-07-18 ASSESSMENT — PAIN SCALES - GENERAL
PAINLEVEL_OUTOF10: 10 - WORST POSSIBLE PAIN
PAINLEVEL_OUTOF10: 8
PAINLEVEL_OUTOF10: 7
PAINLEVEL_OUTOF10: 9
PAINLEVEL_OUTOF10: 6
PAINLEVEL_OUTOF10: 9
PAINLEVEL_OUTOF10: 4
PAINLEVEL_OUTOF10: 7
PAINLEVEL_OUTOF10: 4
PAINLEVEL_OUTOF10: 7
PAINLEVEL_OUTOF10: 8

## 2024-07-18 ASSESSMENT — PAIN DESCRIPTION - LOCATION
LOCATION: ABDOMEN

## 2024-07-18 ASSESSMENT — PAIN DESCRIPTION - ORIENTATION
ORIENTATION: LOWER
ORIENTATION: LOWER

## 2024-07-18 NOTE — PROGRESS NOTES
Postpartum Progress Note    Assessment/Plan   Babs Brown is a 24 y.o., , who delivered at 40w1d gestation and is now postpartum day 2.        Principal Problem:    Previous  delivery affecting pregnancy ([facility])    Pregnancy Problems (from 23 to present)       Problem Noted Resolved    Anemia affecting pregnancy in third trimester ([facility]) 3/4/2024 by Luisa Farah, APRN-CNM, APRN-CNP No    Priority:  Medium      Overview Signed 3/4/2024 12:00 PM by Luisa Farah, APRN-CNM, APRN-CNP     Start Iron supplement second trimester   25 week check retic count with CBC         Suspected fetal anomaly, antepartum ([facility]) 2024 by Germania Mejia MD No    Priority:  Medium      Rubella non-immune status, antepartum ([facility]) 2023 by Luisa Farah, APRN-CNM, APRN-CNP No    Priority:  Medium      Overview Signed 2024  5:43 PM by Luisa Farah APRN-CNARNIE, APRN-CNP     Vaccinate PP         Maternal varicella, non-immune ([facility]) 2023 by Luisa Farah, APRN-CNM, APRN-CNP No    Priority:  Medium      Overview Signed 2024  5:43 PM by Luisa Farah, APRN-CNM, APRN-CNP     Vaccinate PP         Obesity in pregnancy ([facility]) 2023 by Luisa Farah APRN-CNARNIE, APRN-CNP No    Priority:  Medium      Multigravida in second trimester ([facility]) 2024 by Luisa Farah, APRN-CNARNIE, APRN-CNP 2024 by Elham Gasca APRN-CNARNIE          Hospital course: no complications   delivery  Breastfeeding  Pt blood type is B POS. Rhogam is not indicated.    Subjective   Her pain is moderately controlled with current medications  She is passing flatus  She is ambulating well  She is tolerating a Adult diet Regular  She reports no breast or nursing problems  She denies emotional concerns today   Her plan for contraception is unsure         Objective   Allergies:   Topamax [topiramate]         Last Vitals:  Temp Pulse Resp BP MAP Pulse Ox   37.1 °C  (98.8 °F) 92 16 109/71   98 %     Vitals Min/Max Last 24 Hours:  Temp  Min: 36.1 °C (97 °F)  Max: 37.4 °C (99.3 °F)  Pulse  Min: 83  Max: 96  Resp  Min: 16  Max: 20  BP  Min: 97/61  Max: 110/62    Intake/Output:     Intake/Output Summary (Last 24 hours) at 7/18/2024 0819  Last data filed at 7/17/2024 2200  Gross per 24 hour   Intake --   Output 100 ml   Net -100 ml       Physical Exam:  General: Examination reveals a well developed, well nourished, female, in no acute distress. She is alert and cooperative.  Lungs: clear to auscultation bilaterally.  Cardiac: regular rate and rhythm, S1, S2 normal, no murmur, click, rub or gallop.  Incision: dressing intact without drainage or surrounding erythema.  Fundus: firm and at umbilicus.  Extremities: no redness or tenderness in the calves or thighs, no edema.  Neurological: alert, oriented, normal speech, no focal findings or movement disorder noted.  Psychological: awake and alert; oriented to person, place, and time.    Lab Data:  Labs in chart were reviewed.

## 2024-07-18 NOTE — LACTATION NOTE
Lactation Consultant Note  Lactation Consultation  Reason for Consult: Initial assessment  Consultant Name: Frances DOMINIQUE    Maternal Information  Has mother  before?: Yes  How long did the mother previously breastfeed?: currently breastfeeding 16 month old  Previous Maternal Breastfeeding Challenges: Difficult latch, Tongue tie  Infant to breast within first 2 hours of birth?: Yes  Exclusive Pump and Bottle Feed: No    Maternal Assessment  Breast Assessment: Large, Pendulous, Symmetrical  Nipple Assessment: Intact, Erect  Areola Assessment: Normal    Infant Assessment  Infant Behavior: Awake, Rooting response, Feeding cues observed  Infant Assessment: Good cupping of tongue, Sublingual frenulum (comment) (visible at midline)    Feeding Assessment  Nutrition Source: Breastmilk  Feeding Method: Nursing at the breast  Feeding Position: Cradle, C - hold  Suck/Feeding: Sustained, Tactile stimulation needed, Audible swallowing  Latch Assessment: Minimal assistance is needed, Instructed on deep latch, Sucking and swallowing, Sucks with long jaw movement, Chin moves in rhythmic motion    LATCH TOOL  Latch: Grasps breast, tongue down, lips flanged, rhythmic sucking  Audible Swallowing: A few with stimulation  Type of Nipple: Everted (After stimulation)  Comfort (Breast/Nipple): Soft/non-tender  Hold (Positioning): Minimal assist, teach one side, mother does other, staff holds  LATCH Score: 8    Breast Pump       Other OB Lactation Tools       Patient Follow-up  Lactation Professional - OK to Discharge: Yes    Other OB Lactation Documentation       Recommendations/Summary  Minimal assistance with positioning and latch. Baby latched readily and fed sucking with long jaw movement. Swallows noted. Baby was curling in the top lip during feed. Lip was able to be flanged however baby occasionally slipped back to curling in lip. 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 This baby seems to be feeding well at  this time. Mom plans to follow up with a pediatric dentist and outpatient lactation after discharge to have this baby evaluated and treated if needed. Baby's weight and voiding and stooling pattern are appropriate at this time.

## 2024-07-19 VITALS
HEART RATE: 93 BPM | WEIGHT: 215.83 LBS | BODY MASS INDEX: 36.85 KG/M2 | OXYGEN SATURATION: 99 % | HEIGHT: 64 IN | DIASTOLIC BLOOD PRESSURE: 78 MMHG | SYSTOLIC BLOOD PRESSURE: 121 MMHG | RESPIRATION RATE: 15 BRPM | TEMPERATURE: 97 F

## 2024-07-19 LAB
LABORATORY COMMENT REPORT: NORMAL
PATH REPORT.FINAL DX SPEC: NORMAL
PATH REPORT.GROSS SPEC: NORMAL
PATH REPORT.RELEVANT HX SPEC: NORMAL
PATH REPORT.TOTAL CANCER: NORMAL

## 2024-07-19 PROCEDURE — 90707 MMR VACCINE SC: CPT | Performed by: OBSTETRICS & GYNECOLOGY

## 2024-07-19 PROCEDURE — 2500000001 HC RX 250 WO HCPCS SELF ADMINISTERED DRUGS (ALT 637 FOR MEDICARE OP): Performed by: OBSTETRICS & GYNECOLOGY

## 2024-07-19 PROCEDURE — 2500000004 HC RX 250 GENERAL PHARMACY W/ HCPCS (ALT 636 FOR OP/ED): Performed by: OBSTETRICS & GYNECOLOGY

## 2024-07-19 PROCEDURE — 90471 IMMUNIZATION ADMIN: CPT | Performed by: OBSTETRICS & GYNECOLOGY

## 2024-07-19 RX ORDER — SIMETHICONE 80 MG
80 TABLET,CHEWABLE ORAL 4 TIMES DAILY PRN
Qty: 30 TABLET | Refills: 0 | Status: SHIPPED | OUTPATIENT
Start: 2024-07-19

## 2024-07-19 RX ORDER — OXYCODONE HYDROCHLORIDE 5 MG/1
5 TABLET ORAL EVERY 4 HOURS PRN
Qty: 15 TABLET | Refills: 0 | Status: SHIPPED | OUTPATIENT
Start: 2024-07-19

## 2024-07-19 ASSESSMENT — PAIN SCALES - GENERAL
PAINLEVEL_OUTOF10: 7
PAINLEVEL_OUTOF10: 8
PAINLEVEL_OUTOF10: 9
PAINLEVEL_OUTOF10: 9
PAINLEVEL_OUTOF10: 5 - MODERATE PAIN
PAINLEVEL_OUTOF10: 4
PAINLEVEL_OUTOF10: 10 - WORST POSSIBLE PAIN
PAINLEVEL_OUTOF10: 1
PAINLEVEL_OUTOF10: 3

## 2024-07-19 ASSESSMENT — PAIN DESCRIPTION - DESCRIPTORS
DESCRIPTORS: SORE
DESCRIPTORS: ACHING

## 2024-07-19 ASSESSMENT — PAIN DESCRIPTION - LOCATION
LOCATION: ABDOMEN
LOCATION: ABDOMEN

## 2024-07-19 NOTE — DISCHARGE SUMMARY
Discharge Summary    Admission Date: 2024  Discharge Date: 2024    Discharge Diagnosis  Previous  delivery affecting pregnancy ([facility])    Hospital Course  Delivery Date: 2024 7:16 PM  Delivery type: , Low Transverse   GA at delivery: 40w1d  Outcome: Living  Anesthesia during delivery: Epidural  Intrapartum complications: Other Excessive Bleeding;Abruptio Placenta  Feeding method: Breastfeeding Status: Yes     Procedures:  Repeat  Section  Contraception at discharge: none      Pertinent Physical Exam At Time of Discharge    General: Examination reveals a well developed, well nourished, female, in no acute distress. She is alert and cooperative.  Lungs: clear to auscultation bilaterally and normal percussion bilaterally.  Cardiac: regular rate and rhythm, S1, S2 normal, no murmur, click, rub or gallop.  Breasts: lactating.  Abdomen: soft, gravid, nontender, nondistended, no abnormal masses, no epigastric pain, Bowel sounds x 4 present.  Fundus 1fb U.  Incision: Dressing intact.  Fundus: firm.  Perineum: not examined.  Extremities: no redness or tenderness in the calves or thighs, no edema.  Neurological: alert, oriented, normal speech, no focal findings or movement disorder noted.  Psychological: awake and alert; oriented to person, place, and time.    Discharge Meds     Your medication list        START taking these medications        Instructions Last Dose Given Next Dose Due   ibuprofen 600 mg tablet      Take 1 tablet (600 mg) by mouth every 6 hours.       lidocaine 4 % patch      Place 1 patch over 12 hours on the skin every 24 (twenty four) hours if needed for moderate pain (4 - 6). Remove & discard patch within 12 hours or as directed by MD.       oxyCODONE 5 mg immediate release tablet  Commonly known as: Roxicodone      Take 1 tablet (5 mg) by mouth every 4 hours if needed (Pain score 6-8).       simethicone 80 mg chewable tablet  Commonly known as: Mylicon      Chew  1 tablet (80 mg) 4 times a day as needed for flatulence.              CHANGE how you take these medications        Instructions Last Dose Given Next Dose Due   acetaminophen 325 mg tablet  Commonly known as: Tylenol  What changed:   medication strength  how much to take      Take 3 tablets (975 mg) by mouth every 6 hours if needed for mild pain (1 - 3).              CONTINUE taking these medications        Instructions Last Dose Given Next Dose Due   magnesium 200 mg tablet      Take 1 tablet (200 mg) by mouth 2 times a day.       prenatal vitamin (iron-folic) 27 mg iron-800 mcg folic acid tablet      Take 1 tablet by mouth once daily.                 Where to Get Your Medications        These medications were sent to [facility]pharmacy #[id number] - [place], [place] - [address] AT [address] OF [address] [address]      Phone: [phone]   oxyCODONE 5 mg immediate release tablet  simethicone 80 mg chewable tablet       These medications were sent to [facility]  [address], [facility] [address]      Hours: 8 AM to 6 PM Mon-Fri, 9 AM to 1 PM Saturday Phone: [phone]   acetaminophen 325 mg tablet  ibuprofen 600 mg tablet  lidocaine 4 % patch          Complications Requiring Follow-Up  None    Test Results Pending At Discharge  Pending Labs       Order Current Status    Surgical Pathology Exam - PLACENTA In process            Outpatient Follow-Up  Future Appointments   Date Time Provider Department Center   7/23/2024  2:30 PM NELL Waters[id number] spent 15 minutes in the professional and overall care of this patient.      NELL Hadley

## 2024-07-19 NOTE — CARE PLAN
The patient's goals for the shift include pain control    The clinical goals for the shift include MAintain moderate to scant lochia; increase activity; maintain good pain control as evidenced by pain score of 4/10 or less    VSS and assessments WNL. Pain control improving. Mother and infant bonding well. Discharge milestones met.

## 2024-07-23 ENCOUNTER — APPOINTMENT (OUTPATIENT)
Dept: OBSTETRICS AND GYNECOLOGY | Facility: CLINIC | Age: 24
End: 2024-07-23
Payer: COMMERCIAL

## 2024-07-23 VITALS
DIASTOLIC BLOOD PRESSURE: 76 MMHG | HEIGHT: 64 IN | SYSTOLIC BLOOD PRESSURE: 116 MMHG | BODY MASS INDEX: 34.49 KG/M2 | WEIGHT: 202 LBS

## 2024-07-23 DIAGNOSIS — G89.18: ICD-10-CM

## 2024-07-23 DIAGNOSIS — K59.00 CONSTIPATION, UNSPECIFIED CONSTIPATION TYPE: ICD-10-CM

## 2024-07-23 RX ORDER — POLYETHYLENE GLYCOL 3350 17 G/17G
17 POWDER, FOR SOLUTION ORAL DAILY
Qty: 7 PACKET | Refills: 0 | Status: SHIPPED | OUTPATIENT
Start: 2024-07-23 | End: 2024-07-30

## 2024-07-23 RX ORDER — HYDROCODONE BITARTRATE AND ACETAMINOPHEN 5; 325 MG/1; MG/1
1 TABLET ORAL EVERY 6 HOURS PRN
Qty: 20 TABLET | Refills: 0 | Status: SHIPPED | OUTPATIENT
Start: 2024-07-23 | End: 2024-07-30

## 2024-07-23 NOTE — PROGRESS NOTES
Patient presents to the office today for a postpartum exam.   Subjective   24 y.o.  presenting for postpartum follow-up.   S/P LTCS s/p abruption   She is overall feeling poor, complaints of abd pain since stopping oxy. She states she only had one day worth and was having ongoing pain that motrin and tylenol were not helping.   She states her bladder and bowel function have returned to normal. Pt just started moving her bowel yesterday.   She denies fever/chills/N&V.   Bleeding has decreased and is now spotting.  Pain: controlled with NSAIDs and   Lacerations:   Laceration Repaired?   Perineal:       Periurethral:       Labial:       Sulcus:       Vaginal:       Cervical:           Repair suture:     Number of repair packets:     Blood loss (mL):     Total estimated blood loss (mL): 0       Sexual Intimacy: No  Contraceptive Method:  planned IUD @ 6 wga  Feeding Method: She is breast feeding exclusively. no breast or nursing problems  Lactation Consult Needed?: No    Concerns: pain mangement     Delivery Date: 2024  GA at Delivery: 40  Type of Delivery: , Low Transverse     Pregnancy Problems (from 23 to present)       Problem Noted Resolved    Anemia affecting pregnancy in third trimester ([facility]) 3/4/2024 by NELL Waters APRN-CNP No    Priority:  Medium      Overview Signed 3/4/2024 12:00 PM by NELL Waters APRN-CNP     Start Iron supplement second trimester   25 week check retic count with CBC         Suspected fetal anomaly, antepartum ([facility]) 2024 by Germania Mejia MD No    Priority:  Medium      Rubella non-immune status, antepartum ([facility]) 2023 by NELL Waters, KUMAR-CNP No    Priority:  Medium      Overview Signed 2024  5:43 PM by NELL Waters APRN-CNP     Vaccinate PP         Maternal varicella, non-immune ([facility]) 2023 by NELL Waters, APRN-CNP No    Priority:  Medium   "    Overview Signed 2024  5:43 PM by NELL Waters, APRN-CNP     Vaccinate PP         Obesity in pregnancy ([facility]) 2023 by NELL Waters, APRN-CNP No    Priority:  Medium      Multigravida in second trimester ([facility]) 2024 by NELL Waters, APRN-CNP 2024 by NELL Bernal    Priority:  Medium                  Birth Trauma: Yes, describe: abruption while trying to TOLAC  Bonding with Baby: well with Male [unfilled]   Mood:         Objective:  Constitutional; alert with no acute distress.  Well-nourished.      Abdomen: Soft, nontender, no abdominal masses palpated  Incision c/d/I     Genitourinary:  deferred      Skin: Normal skin color and pigmentation    /76   Ht 1.626 m (5' 4\")   Wt 91.6 kg (202 lb)   LMP 10/09/2023   Breastfeeding Yes   BMI 34.67 kg/m²      IMPRESSIONS:  Advised to call office for breast complaints, abnormal bleeding, mood changes, or other concerning symptoms.   Cleared to resume normal activity as desired  RTO for annual exam in 3-6 months.  Contraception r/b reviewed. Education given regarding options for contraception, including IUD placement.    New pain management discussed.   Decreased p.o. Tylenol to 650 mg every 6-8 hours  Motrin every 6-8 hours and  Norco sent 1 tablet every 6-8 hours as needed   I discussed need to caution tylenol use.     Diagnoses and all orders for this visit:  Postpartum care following  delivery ([facility])  -     HYDROcodone-acetaminophen (Norco) 5-325 mg tablet; Take 1 tablet by mouth every 6 hours if needed for moderate pain (4 - 6) or severe pain (7 - 10) for up to 7 days.  -     polyethylene glycol (Glycolax, Miralax) 17 gram packet; Take 17 g by mouth once daily for 7 days.  Constipation, unspecified constipation type  -     polyethylene glycol (Glycolax, Miralax) 17 gram packet; Take 17 g by mouth once daily for 7 days.  Pain following  delivery " ([facility])  -     polyethylene glycol (Glycolax, Miralax) 17 gram packet; Take 17 g by mouth once daily for 7 days.    RTO 5 weeks PP visit with IUD insertion  RTO 1 week if abd pain is persisting     No follow-ups on file.     Luisa Farah, APRN-CNM, APRN-CNP

## 2024-08-08 ENCOUNTER — TELEPHONE (OUTPATIENT)
Dept: OBSTETRICS AND GYNECOLOGY | Facility: CLINIC | Age: 24
End: 2024-08-08
Payer: COMMERCIAL

## 2024-08-08 DIAGNOSIS — K59.00 CONSTIPATION, UNSPECIFIED CONSTIPATION TYPE: ICD-10-CM

## 2024-08-12 RX ORDER — BISACODYL 10 MG/1
SUPPOSITORY RECTAL
Qty: 12 SUPPOSITORY | Refills: 0 | Status: SHIPPED | OUTPATIENT
Start: 2024-08-12

## 2024-08-12 RX ORDER — POLYETHYLENE GLYCOL 3350 17 G/17G
POWDER, FOR SOLUTION ORAL
Qty: 595 G | Refills: 0 | Status: SHIPPED | OUTPATIENT
Start: 2024-08-12

## 2024-08-30 ENCOUNTER — APPOINTMENT (OUTPATIENT)
Dept: OBSTETRICS AND GYNECOLOGY | Facility: CLINIC | Age: 24
End: 2024-08-30
Payer: COMMERCIAL

## 2024-09-12 ENCOUNTER — APPOINTMENT (OUTPATIENT)
Dept: OBSTETRICS AND GYNECOLOGY | Facility: CLINIC | Age: 24
End: 2024-09-12
Payer: COMMERCIAL

## 2024-09-22 NOTE — PROGRESS NOTES
Patient presents to the office today for a postpartum exam.   Subjective   24 y.o.  presenting for postpartum follow-up.   S/P repeat LTCS   She is overall feeling well.   She states her bladder and bowel function have returned to normal.   She denies fever/chills/N&V.   Bleeding has decreased and is now spotting.  Pain: controlled with NSAIDs and   Lacerations: n/a  This patient has no babies on file.    Babs has had ongoing regular left lower quadrant pain since LTCS.  She states the pain went away for about 4 weeks but over the past week she has had increased pain that has worsened as she lay in bed at night.  She has had to take Motrin 800 mg as well as 2 extra strength Tylenol to control the pain as she gets up to move in the mornings.    Patient denies constipation or urinary signs and symptoms.  The pain is lateral to her  incision.    Patient states she has had no uterine bleeding and mild pelvic cramping.    The pain is relieved with patient's position changes in the morning and after taking NSAIDs.  She has only been taking the NSAIDs over this past week however.    Sexual Intimacy: Yes once 3 weeks ago with a condom, negative pregnancy test today.   Contraceptive Method:  planned IUD today  Feeding Method: She is breast feeding exclusively. no breast or nursing problems  Lactation Consult Needed?: No    Concerns: none    Delivery Date: This patient has no babies on file.    Type of Delivery: This patient has no babies on file.     Pregnancy Problems (from 23 to present)       Problem Noted Resolved    Anemia affecting pregnancy in third trimester ([facility]) 3/4/2024 by NELL Waters, APRN-CNP No    Priority:  Medium      Overview Signed 3/4/2024 12:00 PM by NELL Waters, APRN-CNP     Start Iron supplement second trimester   25 week check retic count with CBC         Suspected fetal anomaly, antepartum ([facility]) 2024 by Germania Mejia MD No     Priority:  Medium      Rubella non-immune status, antepartum ([facility]) 12/31/2023 by Luisa Farah APRN-CNARNIE, APRN-CNP No    Priority:  Medium      Overview Signed 2/21/2024  5:43 PM by KUMAR Waters-EDDIE, APRN-CNP     Vaccinate PP         Maternal varicella, non-immune ([facility]) 12/31/2023 by Luisa Farah APRN-CNARNIE, APRN-CNP No    Priority:  Medium      Overview Signed 2/21/2024  5:43 PM by Luisa Faarh, APRN-CNARNIE, APRN-CNP     Vaccinate PP         Obesity in pregnancy ([facility]) 12/11/2023 by Luisa Farah APRN-CNARNIE, APRN-CNP No    Priority:  Medium      Multigravida in second trimester ([facility]) 2/5/2024 by KUMAR Waters-EDDIE, APRN-CNP 7/14/2024 by KUMAR Bernal-EDDIE    Priority:  Medium              PP scale reviewed 4    Objective:  Constitutional; alert with no acute distress.  Well-nourished.      Abdomen: Soft, nontender, no abdominal masses palpated    Genitourinary:  Palpation of the lymph nodes of the groin-no inguinal lymphadenopathy  External genitalia/perianal: Without lesions normal in appearance   Urethra: In appearance without lesions Bladder: non-tender to palpate  Vagina: Without lesions including Bartholin, urethra, [place]'s glands within normal limits all WNL   Cervix: Normal in appearance without lesions, no cervical motion tenderness to palpation  Uterus: Without enlargement, mobile, tender to palpate LLQ   Bilateral adnexa:  without masses, nontender to palpate      Skin: Normal skin color and pigmentation    /80   Wt 89.4 kg (197 lb)   Breastfeeding Yes   BMI 33.81 kg/m²      IMPRESSIONS:  Advised to call office for breast complaints, abnormal bleeding, mood changes, or other concerning symptoms.   Cleared to resume normal activity as desired  RTO for annual exam in 3-6 months.  Contraception r/b reviewed. Education given regarding options for contraception, including IUD placement.      LLQ pain  Advised to follow up with ultrasound for  LLQ pain  Also advised she may need consult with OB-GYN and or general surgeon if pain persists  Pelvic exam without concern except for tenderness LLQ    Diagnoses and all orders for this visit:  Encounter for IUD insertion  -     IUD Insertion  -     copper (Paragard) 380 square mm IUD 1 each  LLQ pain  -     US PELVIS TRANSABDOMINAL WITH TRANSVAGINAL; Future      No follow-ups on file.     NELL Waters, APRN-CNP    IUD Insertion    Performed by: NELL Waters APRN-CNP  Authorized by: NELL Waters APRN-CNP    Procedure: IUD insertion    Pregnancy risk: reasonably certain the patient is not pregnant    Date/Time of Insertion:  9/23/2024 10:49 AM  Immediately prior to procedure a time out was called: no    Pelvic exam performed: yes    Speculum placed in vagina: yes    Cervix cleaned and prepped: yes    Tenaculum/Allis/Ring Forceps applied to cervix: yes    Uterus sound depth (cm):  9  Cervix manually dilated: no    IUD inserted without complications: yes    Strings trimmed to (cm):  2  Patient tolerated procedure well: yes    Inserted with ultrasound guidance: no    Transvaginal sono confirmed fundal placement: no    Intended removal date: 10 years    Insertion comments: Cervix was very anterior, possible scaring from LTCS. Uterus sounded in mid position. IUD placed without difficult.    Advised to use condoms for contraception, confirm IUD placement with ultrasound.     NELL Waters, YOVANI

## 2024-09-23 ENCOUNTER — APPOINTMENT (OUTPATIENT)
Dept: OBSTETRICS AND GYNECOLOGY | Facility: CLINIC | Age: 24
End: 2024-09-23
Payer: COMMERCIAL

## 2024-09-23 VITALS — SYSTOLIC BLOOD PRESSURE: 110 MMHG | DIASTOLIC BLOOD PRESSURE: 80 MMHG | WEIGHT: 197 LBS | BODY MASS INDEX: 33.81 KG/M2

## 2024-09-23 DIAGNOSIS — R10.32 LLQ PAIN: ICD-10-CM

## 2024-09-23 DIAGNOSIS — Z30.430 ENCOUNTER FOR IUD INSERTION: Primary | ICD-10-CM

## 2024-09-23 PROBLEM — O35.9XX0 SUSPECTED FETAL ANOMALY, ANTEPARTUM (HHS-HCC): Status: RESOLVED | Noted: 2024-02-05 | Resolved: 2024-07-16

## 2024-09-23 PROBLEM — O09.899 RUBELLA NON-IMMUNE STATUS, ANTEPARTUM (HHS-HCC): Status: RESOLVED | Noted: 2023-12-31 | Resolved: 2024-07-16

## 2024-09-23 PROBLEM — O99.210 OBESITY IN PREGNANCY (HHS-HCC): Status: RESOLVED | Noted: 2023-12-11 | Resolved: 2024-07-16

## 2024-09-23 PROBLEM — Z28.39 RUBELLA NON-IMMUNE STATUS, ANTEPARTUM (HHS-HCC): Status: RESOLVED | Noted: 2023-12-31 | Resolved: 2024-07-16

## 2024-09-23 PROBLEM — Z28.39 MATERNAL VARICELLA, NON-IMMUNE (HHS-HCC): Status: RESOLVED | Noted: 2023-12-31 | Resolved: 2024-07-16

## 2024-09-23 PROBLEM — O09.899 MATERNAL VARICELLA, NON-IMMUNE (HHS-HCC): Status: RESOLVED | Noted: 2023-12-31 | Resolved: 2024-07-16

## 2024-09-23 PROCEDURE — 58300 INSERT INTRAUTERINE DEVICE: CPT | Performed by: NURSE PRACTITIONER

## 2024-09-23 ASSESSMENT — EDINBURGH POSTNATAL DEPRESSION SCALE (EPDS)
I HAVE BEEN ABLE TO LAUGH AND SEE THE FUNNY SIDE OF THINGS: AS MUCH AS I ALWAYS COULD
I HAVE BLAMED MYSELF UNNECESSARILY WHEN THINGS WENT WRONG: YES, SOME OF THE TIME
I HAVE LOOKED FORWARD WITH ENJOYMENT TO THINGS: AS MUCH AS I EVER DID
I HAVE BEEN SO UNHAPPY THAT I HAVE HAD DIFFICULTY SLEEPING: NOT AT ALL
I HAVE BEEN SO UNHAPPY THAT I HAVE BEEN CRYING: NO, NEVER
THINGS HAVE BEEN GETTING ON TOP OF ME: NO, I HAVE BEEN COPING AS WELL AS EVER
THE THOUGHT OF HARMING MYSELF HAS OCCURRED TO ME: NEVER
I HAVE BEEN ANXIOUS OR WORRIED FOR NO GOOD REASON: YES, SOMETIMES
I HAVE FELT SCARED OR PANICKY FOR NO GOOD REASON: NO, NOT AT ALL
TOTAL SCORE: 4
I HAVE FELT SAD OR MISERABLE: NO, NOT AT ALL

## 2024-09-26 ENCOUNTER — HOSPITAL ENCOUNTER (OUTPATIENT)
Dept: RADIOLOGY | Facility: HOSPITAL | Age: 24
Discharge: HOME | End: 2024-09-26
Payer: COMMERCIAL

## 2024-09-26 DIAGNOSIS — R10.32 LLQ PAIN: ICD-10-CM

## 2024-09-26 PROCEDURE — 76856 US EXAM PELVIC COMPLETE: CPT

## 2024-09-27 ENCOUNTER — TELEPHONE (OUTPATIENT)
Dept: OBSTETRICS AND GYNECOLOGY | Facility: CLINIC | Age: 24
End: 2024-09-27
Payer: COMMERCIAL

## 2024-09-30 ENCOUNTER — APPOINTMENT (OUTPATIENT)
Dept: OBSTETRICS AND GYNECOLOGY | Facility: CLINIC | Age: 24
End: 2024-09-30
Payer: COMMERCIAL

## 2024-10-02 ENCOUNTER — TELEPHONE (OUTPATIENT)
Dept: OBSTETRICS AND GYNECOLOGY | Facility: CLINIC | Age: 24
End: 2024-10-02
Payer: COMMERCIAL

## 2024-10-02 DIAGNOSIS — T83.32XA INTRAUTERINE DEVICE (IUD) MIGRATION, INITIAL ENCOUNTER: ICD-10-CM

## 2024-10-02 NOTE — TELEPHONE ENCOUNTER
----- Message from Luisa Farah sent at 10/2/2024 12:54 PM EDT -----  I spoke with Dr. Desir. He agrees with me, he thinks the IUD is in the correct place. However, he is recommending a pelvic CT without contrast to confirm location.

## 2024-10-07 ENCOUNTER — TELEPHONE (OUTPATIENT)
Dept: FAMILY MEDICINE CLINIC | Age: 24
End: 2024-10-07

## 2024-10-07 NOTE — TELEPHONE ENCOUNTER
Patient called asking to switch appt to virtual appointment today. I advised her that we have not seen her in office since 2022. Patient said she would make it to the appointment.

## 2024-10-08 ENCOUNTER — APPOINTMENT (OUTPATIENT)
Dept: NEUROLOGY | Facility: CLINIC | Age: 24
End: 2024-10-08
Payer: COMMERCIAL

## 2024-10-17 ENCOUNTER — HOSPITAL ENCOUNTER (OUTPATIENT)
Dept: RADIOLOGY | Facility: HOSPITAL | Age: 24
Discharge: HOME | End: 2024-10-17
Payer: COMMERCIAL

## 2024-10-17 DIAGNOSIS — T83.32XA INTRAUTERINE DEVICE (IUD) MIGRATION, INITIAL ENCOUNTER: ICD-10-CM

## 2024-10-17 PROCEDURE — 74176 CT ABD & PELVIS W/O CONTRAST: CPT

## 2024-10-22 ENCOUNTER — APPOINTMENT (OUTPATIENT)
Dept: OBSTETRICS AND GYNECOLOGY | Facility: CLINIC | Age: 24
End: 2024-10-22
Payer: COMMERCIAL

## 2024-12-10 ENCOUNTER — TELEPHONE (OUTPATIENT)
Dept: OBSTETRICS AND GYNECOLOGY | Facility: CLINIC | Age: 24
End: 2024-12-10

## 2024-12-10 ENCOUNTER — APPOINTMENT (OUTPATIENT)
Dept: OBSTETRICS AND GYNECOLOGY | Facility: CLINIC | Age: 24
End: 2024-12-10
Payer: COMMERCIAL

## 2024-12-10 NOTE — TELEPHONE ENCOUNTER
Pt asked if it is normal for partner to be able to feel IUD strings during intercourse. I reviewed that this can be common and usually if we trim the strings it'll help prevent this, so we can plan to check this at IUD check and trim strings if appropriate.

## 2024-12-10 NOTE — TELEPHONE ENCOUNTER
Pt had to cancel her appt today due to a family emergency. Could you please let me know where to reschedule her and she would like a call back to ask a question.

## 2024-12-17 ENCOUNTER — HOSPITAL ENCOUNTER (EMERGENCY)
Age: 24
Discharge: HOME | End: 2024-12-17
Payer: COMMERCIAL

## 2024-12-17 VITALS
RESPIRATION RATE: 16 BRPM | SYSTOLIC BLOOD PRESSURE: 124 MMHG | HEART RATE: 95 BPM | TEMPERATURE: 97.3 F | OXYGEN SATURATION: 100 % | DIASTOLIC BLOOD PRESSURE: 76 MMHG

## 2024-12-17 DIAGNOSIS — K08.89 PAIN, DENTAL: Primary | ICD-10-CM

## 2024-12-17 DIAGNOSIS — K02.9 DENTAL DECAY: ICD-10-CM

## 2024-12-17 PROCEDURE — 6370000000 HC RX 637 (ALT 250 FOR IP): Performed by: NURSE PRACTITIONER

## 2024-12-17 PROCEDURE — 99283 EMERGENCY DEPT VISIT LOW MDM: CPT

## 2024-12-17 RX ORDER — HYDROCODONE BITARTRATE AND ACETAMINOPHEN 5; 325 MG/1; MG/1
1 TABLET ORAL ONCE
Status: COMPLETED | OUTPATIENT
Start: 2024-12-17 | End: 2024-12-17

## 2024-12-17 RX ORDER — CHLORHEXIDINE GLUCONATE ORAL RINSE 1.2 MG/ML
15 SOLUTION DENTAL 2 TIMES DAILY
Qty: 420 ML | Refills: 0 | Status: SHIPPED | OUTPATIENT
Start: 2024-12-17 | End: 2024-12-31

## 2024-12-17 RX ORDER — LIDOCAINE HYDROCHLORIDE 20 MG/ML
15 SOLUTION OROPHARYNGEAL ONCE
Status: COMPLETED | OUTPATIENT
Start: 2024-12-17 | End: 2024-12-17

## 2024-12-17 RX ADMIN — HYDROCODONE BITARTRATE AND ACETAMINOPHEN 1 TABLET: 5; 325 TABLET ORAL at 16:17

## 2024-12-17 RX ADMIN — LIDOCAINE HYDROCHLORIDE 15 ML: 20 SOLUTION ORAL at 16:18

## 2024-12-17 ASSESSMENT — LIFESTYLE VARIABLES
HOW OFTEN DO YOU HAVE A DRINK CONTAINING ALCOHOL: NEVER
HOW MANY STANDARD DRINKS CONTAINING ALCOHOL DO YOU HAVE ON A TYPICAL DAY: PATIENT DOES NOT DRINK

## 2024-12-17 ASSESSMENT — PAIN SCALES - GENERAL: PAINLEVEL_OUTOF10: 8

## 2024-12-17 NOTE — DISCHARGE INSTRUCTIONS
GO TO THE DENTAL CLINIC TOMORROW MORNING AT 730AM.  TYLENOL AND IBUPROFEN FOR PAIN.  ORAJEL FOR PAIN.  TAKE THE ANTIBIOTICS UNTIL COMPLETED.  RETURN FOR WORSENING SYMPTOMS.

## 2024-12-17 NOTE — ED PROVIDER NOTES
in her maternal grandmother.     Allergies: Topiramate    Physical Exam   Oxygen Saturation Interpretation: Normal.        ED Triage Vitals   BP Systolic BP Percentile Diastolic BP Percentile Temp Temp Source Pulse Respirations SpO2   12/17/24 1436 -- -- 12/17/24 1403 12/17/24 1403 12/17/24 1403 12/17/24 1436 12/17/24 1403   137/88   97.3 °F (36.3 °C) Temporal (!) 109 16 100 %      Height Weight         -- --                         Constitutional:  Alert, development consistent with age. No acute distress.   HEENT:  NC/NT.  Airway patent.  Neck:  Supple. Normal ROM.  Lips:  upper and lower normal.  Mouth:  normal tongue and buccal mucosa.  Dental:  pain and decayed #31. No obvious abscess formation. No gingival bleeding.  Physical Exam                  Trismus: No.         Drooling: No.           Airway stridor: No.  Facial skin: bilateral no wounds, erythema, or swelling.  Respiratory:  Respirations regular, non-labored, no tachypnea.     CV: Skin warm, pink, dry.   Neurological:  Oriented.  Motor functions intact. GCS 15, fluent speech, ambulatory, JOSEPH x4.     Lab / Imaging Results   (All laboratory and radiology results have been personally reviewed by myself)  Labs:  No results found for this visit on 12/17/24.  Imaging:  All Radiology results interpreted by Radiologist unless otherwise noted.  No orders to display     ED Course / Medical Decision Making     Medications   HYDROcodone-acetaminophen (NORCO) 5-325 MG per tablet 1 tablet (1 tablet Oral Given 12/17/24 1617)   lidocaine viscous hcl (XYLOCAINE) 2 % solution 15 mL (15 mLs Mouth/Throat Given 12/17/24 1618)        Consult(s):   Dental Resident was not consulted to see patient regarding complaint.    Procedure(s):   None    MDM:   24 y.o. old female who presents to the emergency department by private vehicle, for ongoing right lower dental pain since May.  She said she has been on amoxicillin 5 times for this and has seen multiple dentists and oral

## 2024-12-22 ENCOUNTER — PHARMACY VISIT (OUTPATIENT)
Dept: PHARMACY | Facility: CLINIC | Age: 24
End: 2024-12-22
Payer: MEDICAID

## 2024-12-22 ENCOUNTER — HOSPITAL ENCOUNTER (EMERGENCY)
Facility: HOSPITAL | Age: 24
Discharge: HOME | End: 2024-12-22
Attending: EMERGENCY MEDICINE
Payer: COMMERCIAL

## 2024-12-22 VITALS
RESPIRATION RATE: 18 BRPM | TEMPERATURE: 96.8 F | HEART RATE: 99 BPM | WEIGHT: 180 LBS | OXYGEN SATURATION: 98 % | HEIGHT: 64 IN | SYSTOLIC BLOOD PRESSURE: 123 MMHG | BODY MASS INDEX: 30.73 KG/M2 | DIASTOLIC BLOOD PRESSURE: 85 MMHG

## 2024-12-22 DIAGNOSIS — R10.13 EPIGASTRIC PAIN: Primary | ICD-10-CM

## 2024-12-22 DIAGNOSIS — K08.89 PAIN, DENTAL: ICD-10-CM

## 2024-12-22 LAB
ALBUMIN SERPL BCP-MCNC: 4.1 G/DL (ref 3.4–5)
ALP SERPL-CCNC: 83 U/L (ref 33–110)
ALT SERPL W P-5'-P-CCNC: 10 U/L (ref 7–45)
ANION GAP SERPL CALC-SCNC: 9 MMOL/L (ref 10–20)
AST SERPL W P-5'-P-CCNC: 15 U/L (ref 9–39)
BASOPHILS # BLD AUTO: 0.04 X10*3/UL (ref 0–0.1)
BASOPHILS NFR BLD AUTO: 0.6 %
BILIRUB SERPL-MCNC: 0.3 MG/DL (ref 0–1.2)
BUN SERPL-MCNC: 12 MG/DL (ref 6–23)
CALCIUM SERPL-MCNC: 9.1 MG/DL (ref 8.6–10.3)
CHLORIDE SERPL-SCNC: 107 MMOL/L (ref 98–107)
CO2 SERPL-SCNC: 27 MMOL/L (ref 21–32)
CREAT SERPL-MCNC: 0.69 MG/DL (ref 0.5–1.05)
EGFRCR SERPLBLD CKD-EPI 2021: >90 ML/MIN/1.73M*2
EOSINOPHIL # BLD AUTO: 0.13 X10*3/UL (ref 0–0.7)
EOSINOPHIL NFR BLD AUTO: 1.9 %
ERYTHROCYTE [DISTWIDTH] IN BLOOD BY AUTOMATED COUNT: 13.3 % (ref 11.5–14.5)
GLUCOSE SERPL-MCNC: 92 MG/DL (ref 74–99)
HCT VFR BLD AUTO: 33.9 % (ref 36–46)
HGB BLD-MCNC: 11.5 G/DL (ref 12–16)
IMM GRANULOCYTES # BLD AUTO: 0.01 X10*3/UL (ref 0–0.7)
IMM GRANULOCYTES NFR BLD AUTO: 0.1 % (ref 0–0.9)
LIPASE SERPL-CCNC: 31 U/L (ref 9–82)
LYMPHOCYTES # BLD AUTO: 2.76 X10*3/UL (ref 1.2–4.8)
LYMPHOCYTES NFR BLD AUTO: 40.1 %
MCH RBC QN AUTO: 30.2 PG (ref 26–34)
MCHC RBC AUTO-ENTMCNC: 33.9 G/DL (ref 32–36)
MCV RBC AUTO: 89 FL (ref 80–100)
MONOCYTES # BLD AUTO: 0.31 X10*3/UL (ref 0.1–1)
MONOCYTES NFR BLD AUTO: 4.5 %
NEUTROPHILS # BLD AUTO: 3.63 X10*3/UL (ref 1.2–7.7)
NEUTROPHILS NFR BLD AUTO: 52.8 %
NRBC BLD-RTO: 0 /100 WBCS (ref 0–0)
PLATELET # BLD AUTO: 283 X10*3/UL (ref 150–450)
POTASSIUM SERPL-SCNC: 4 MMOL/L (ref 3.5–5.3)
PROT SERPL-MCNC: 6.8 G/DL (ref 6.4–8.2)
RBC # BLD AUTO: 3.81 X10*6/UL (ref 4–5.2)
SODIUM SERPL-SCNC: 139 MMOL/L (ref 136–145)
WBC # BLD AUTO: 6.9 X10*3/UL (ref 4.4–11.3)

## 2024-12-22 PROCEDURE — 83690 ASSAY OF LIPASE: CPT | Performed by: EMERGENCY MEDICINE

## 2024-12-22 PROCEDURE — 36415 COLL VENOUS BLD VENIPUNCTURE: CPT | Performed by: EMERGENCY MEDICINE

## 2024-12-22 PROCEDURE — 85025 COMPLETE CBC W/AUTO DIFF WBC: CPT | Performed by: EMERGENCY MEDICINE

## 2024-12-22 PROCEDURE — RXMED WILLOW AMBULATORY MEDICATION CHARGE

## 2024-12-22 PROCEDURE — 96374 THER/PROPH/DIAG INJ IV PUSH: CPT

## 2024-12-22 PROCEDURE — 2500000004 HC RX 250 GENERAL PHARMACY W/ HCPCS (ALT 636 FOR OP/ED): Performed by: EMERGENCY MEDICINE

## 2024-12-22 PROCEDURE — 99284 EMERGENCY DEPT VISIT MOD MDM: CPT | Mod: 25 | Performed by: EMERGENCY MEDICINE

## 2024-12-22 PROCEDURE — 96375 TX/PRO/DX INJ NEW DRUG ADDON: CPT

## 2024-12-22 PROCEDURE — 84295 ASSAY OF SERUM SODIUM: CPT | Performed by: EMERGENCY MEDICINE

## 2024-12-22 RX ORDER — ONDANSETRON HYDROCHLORIDE 2 MG/ML
4 INJECTION, SOLUTION INTRAVENOUS ONCE
Status: COMPLETED | OUTPATIENT
Start: 2024-12-22 | End: 2024-12-22

## 2024-12-22 RX ORDER — PANTOPRAZOLE SODIUM 40 MG/10ML
40 INJECTION, POWDER, LYOPHILIZED, FOR SOLUTION INTRAVENOUS ONCE
Status: COMPLETED | OUTPATIENT
Start: 2024-12-22 | End: 2024-12-22

## 2024-12-22 RX ORDER — ONDANSETRON 4 MG/1
4 TABLET, ORALLY DISINTEGRATING ORAL EVERY 8 HOURS PRN
Qty: 20 TABLET | Refills: 0 | Status: SHIPPED | OUTPATIENT
Start: 2024-12-22 | End: 2024-12-29

## 2024-12-22 RX ORDER — HYDROCODONE BITARTRATE AND ACETAMINOPHEN 5; 325 MG/1; MG/1
1 TABLET ORAL EVERY 6 HOURS PRN
Qty: 8 TABLET | Refills: 0 | Status: SHIPPED | OUTPATIENT
Start: 2024-12-22 | End: 2024-12-24

## 2024-12-22 RX ORDER — PANTOPRAZOLE SODIUM 40 MG/1
40 TABLET, DELAYED RELEASE ORAL
Qty: 30 TABLET | Refills: 0 | Status: SHIPPED | OUTPATIENT
Start: 2024-12-22

## 2024-12-22 ASSESSMENT — LIFESTYLE VARIABLES
HAVE PEOPLE ANNOYED YOU BY CRITICIZING YOUR DRINKING: NO
TOTAL SCORE: 0
HAVE YOU EVER FELT YOU SHOULD CUT DOWN ON YOUR DRINKING: NO
EVER FELT BAD OR GUILTY ABOUT YOUR DRINKING: NO
EVER HAD A DRINK FIRST THING IN THE MORNING TO STEADY YOUR NERVES TO GET RID OF A HANGOVER: NO

## 2024-12-22 ASSESSMENT — PAIN - FUNCTIONAL ASSESSMENT: PAIN_FUNCTIONAL_ASSESSMENT: 0-10

## 2024-12-22 ASSESSMENT — PAIN SCALES - GENERAL: PAINLEVEL_OUTOF10: 8

## 2024-12-22 NOTE — ED PROVIDER NOTES
HPI   Chief Complaint   Patient presents with    Abdominal Pain     Stomach cramping after taking ibuprofen         Patient presents to the emergency department secondary to upper abdominal pain.  Patient has had symptoms over the last few weeks.  She has been taking ibuprofen daily secondary to dental pain.  Earlier this week patient had dental extraction.  She had 5 teeth removed.  At that time she explained to them about the abdominal pain and they still recommended ibuprofen for pain relief.  She was also placed on Augmentin.  Since being on Augmentin she has noticed some diarrhea.  She has had nausea but no active vomiting.  She has had no fever or chills.  She does state that eating makes it worse.  She has tried not taking the ibuprofen today and taking Tylenol instead.  She feels like that does not help as much with the dental pain.  She does not have a follow-up scheduled with the dentist who did her extractions.  She was told to follow-up with her general dentist.  She does not have an appointment scheduled with her dentist for at least 2 months.  She is currently breast-feeding.  She is still on Augmentin at this time.                          Donahue Coma Scale Score: 15                  Patient History   Past Medical History:   Diagnosis Date    ADHD     Anxiety     Depression     Encounter for  screening for malformations ([facility]) 2022    Encounter for  screening for malformations    Migraine      Past Surgical History:   Procedure Laterality Date     SECTION, LOW TRANSVERSE      OTHER SURGICAL HISTORY  2022    Foot surgery    OTHER SURGICAL HISTORY  2022    Dilation and curettage     Family History   Problem Relation Name Age of Onset    Depression Mother      Blood clot Mother      Other (heart condition) Father      Heart disease Father      Asthma Sister      Sleep apnea Mother's Sister      Spina bifida Mother's Brother      Diabetes Maternal  Grandmother      Gout Maternal Grandmother      Diabetes Maternal Grandfather      Kidney failure Maternal Grandfather      Spina bifida Other Cousin      Social History     Tobacco Use    Smoking status: Never    Smokeless tobacco: Never   Vaping Use    Vaping status: Never Used   Substance Use Topics    Alcohol use: Not Currently    Drug use: Never       Physical Exam   ED Triage Vitals [12/22/24 1129]   Temperature Heart Rate Respirations BP   36 °C (96.8 °F) 99 18 123/85      Pulse Ox Temp Source Heart Rate Source Patient Position   98 % Temporal Monitor --      BP Location FiO2 (%)     -- --       Physical Exam  Constitutional:       Appearance: Normal appearance. She is well-developed. She is not ill-appearing.   HENT:      Head: Normocephalic and atraumatic.      Right Ear: Tympanic membrane normal.      Left Ear: Tympanic membrane normal.      Nose: Nose normal.      Mouth/Throat:      Mouth: Mucous membranes are moist.      Pharynx: Oropharynx is clear.      Comments: Patient has evidence of recent dental extractions.  No evidence of dry socket.  Eyes:      Extraocular Movements: Extraocular movements intact.      Pupils: Pupils are equal, round, and reactive to light.   Cardiovascular:      Rate and Rhythm: Normal rate and regular rhythm.      Pulses: Normal pulses.      Heart sounds: Normal heart sounds.   Pulmonary:      Effort: Pulmonary effort is normal.      Breath sounds: Normal breath sounds.   Abdominal:      General: Abdomen is flat. Bowel sounds are normal.      Palpations: Abdomen is soft.      Tenderness: There is abdominal tenderness in the epigastric area. There is no guarding or rebound.   Musculoskeletal:         General: Normal range of motion.   Skin:     General: Skin is warm and dry.      Capillary Refill: Capillary refill takes less than 2 seconds.   Neurological:      General: No focal deficit present.      Mental Status: She is alert and oriented to person, place, and time.    Psychiatric:         Mood and Affect: Mood normal.         Behavior: Behavior normal.       Labs Reviewed   CBC WITH AUTO DIFFERENTIAL   COMPREHENSIVE METABOLIC PANEL   LIPASE     Pain Management Panel          Latest Ref Rng & Units 8/9/2022   Pain Management Panel   Amphetamine Screen, Urine NEGATIVE PRESUMPTIVE NEGATIVE  PRESUMPTIVE NEGATIVE    Barbiturate Screen, Urine NEGATIVE PRESUMPTIVE NEGATIVE  PRESUMPTIVE NEGATIVE    Fentanyl Screen, Urine NEGATIVE PRESUMPTIVE NEGATIVE  PRESUMPTIVE NEGATIVE    Methadone Screen, Urine NEGATIVE PRESUMPTIVE NEGATIVE  PRESUMPTIVE NEGATIVE       Details          Multiple values from one day are sorted in reverse-chronological order             No orders to display     ED Course & MDM   Diagnoses as of 12/22/24 1317   Epigastric pain   Pain, dental       Medical Decision Making  Patient presents secondary to epigastric abdominal pain.  Differential diagnosis for this patient is GI irritation from ibuprofen.  Ulcer disease, gallbladder disease, or other acute cause.  Patient is evaluated in the emergency department laboratory workup including CBC CMP and lipase.  Patient is given 4 mg of IV Zofran and 40 mg of IV Protonix for symptom relief.  Patient's laboratory workup is unremarkable.  No evidence of pancreatitis or elevated liver enzymes.  Patient is to stop the ibuprofen for 24 hours.  She is given Norco to use as needed for pain.  She is given prescriptions for Zofran and Protonix.  She is to follow-up with her primary care and also her dentist.  She is to return for new or worsening symptoms.        Procedure  Procedures     Catalina Tony MD  12/22/24 7489

## 2024-12-27 ENCOUNTER — APPOINTMENT (OUTPATIENT)
Dept: RADIOLOGY | Facility: HOSPITAL | Age: 24
End: 2024-12-27
Payer: COMMERCIAL

## 2024-12-27 ENCOUNTER — HOSPITAL ENCOUNTER (EMERGENCY)
Facility: HOSPITAL | Age: 24
Discharge: HOME | End: 2024-12-27
Attending: EMERGENCY MEDICINE
Payer: COMMERCIAL

## 2024-12-27 ENCOUNTER — PHARMACY VISIT (OUTPATIENT)
Dept: PHARMACY | Facility: CLINIC | Age: 24
End: 2024-12-27
Payer: MEDICAID

## 2024-12-27 VITALS
HEART RATE: 73 BPM | TEMPERATURE: 97 F | RESPIRATION RATE: 19 BRPM | OXYGEN SATURATION: 99 % | HEIGHT: 64 IN | BODY MASS INDEX: 32.27 KG/M2 | SYSTOLIC BLOOD PRESSURE: 131 MMHG | DIASTOLIC BLOOD PRESSURE: 85 MMHG | WEIGHT: 189 LBS

## 2024-12-27 DIAGNOSIS — R10.31 RIGHT LOWER QUADRANT ABDOMINAL PAIN: Primary | ICD-10-CM

## 2024-12-27 LAB
ALBUMIN SERPL BCP-MCNC: 3.8 G/DL (ref 3.4–5)
ALP SERPL-CCNC: 75 U/L (ref 33–110)
ALT SERPL W P-5'-P-CCNC: 7 U/L (ref 7–45)
ANION GAP SERPL CALC-SCNC: 13 MMOL/L (ref 10–20)
APPEARANCE UR: ABNORMAL
AST SERPL W P-5'-P-CCNC: 9 U/L (ref 9–39)
BACTERIA #/AREA URNS AUTO: ABNORMAL /HPF
BASOPHILS # BLD AUTO: 0.05 X10*3/UL (ref 0–0.1)
BASOPHILS NFR BLD AUTO: 0.7 %
BILIRUB SERPL-MCNC: 0.3 MG/DL (ref 0–1.2)
BILIRUB UR STRIP.AUTO-MCNC: NEGATIVE MG/DL
BUN SERPL-MCNC: 16 MG/DL (ref 6–23)
CALCIUM SERPL-MCNC: 8.7 MG/DL (ref 8.6–10.3)
CHLORIDE SERPL-SCNC: 107 MMOL/L (ref 98–107)
CO2 SERPL-SCNC: 27 MMOL/L (ref 21–32)
COLOR UR: YELLOW
CREAT SERPL-MCNC: 0.73 MG/DL (ref 0.5–1.05)
EGFRCR SERPLBLD CKD-EPI 2021: >90 ML/MIN/1.73M*2
EOSINOPHIL # BLD AUTO: 0.12 X10*3/UL (ref 0–0.7)
EOSINOPHIL NFR BLD AUTO: 1.6 %
ERYTHROCYTE [DISTWIDTH] IN BLOOD BY AUTOMATED COUNT: 13.7 % (ref 11.5–14.5)
GLUCOSE SERPL-MCNC: 88 MG/DL (ref 74–99)
GLUCOSE UR STRIP.AUTO-MCNC: NORMAL MG/DL
HCG UR QL IA.RAPID: NEGATIVE
HCT VFR BLD AUTO: 32.6 % (ref 36–46)
HGB BLD-MCNC: 11 G/DL (ref 12–16)
HOLD SPECIMEN: NORMAL
IMM GRANULOCYTES # BLD AUTO: 0.02 X10*3/UL (ref 0–0.7)
IMM GRANULOCYTES NFR BLD AUTO: 0.3 % (ref 0–0.9)
KETONES UR STRIP.AUTO-MCNC: NEGATIVE MG/DL
LEUKOCYTE ESTERASE UR QL STRIP.AUTO: ABNORMAL
LYMPHOCYTES # BLD AUTO: 2.65 X10*3/UL (ref 1.2–4.8)
LYMPHOCYTES NFR BLD AUTO: 36 %
MCH RBC QN AUTO: 30.4 PG (ref 26–34)
MCHC RBC AUTO-ENTMCNC: 33.7 G/DL (ref 32–36)
MCV RBC AUTO: 90 FL (ref 80–100)
MONOCYTES # BLD AUTO: 0.38 X10*3/UL (ref 0.1–1)
MONOCYTES NFR BLD AUTO: 5.2 %
MUCOUS THREADS #/AREA URNS AUTO: ABNORMAL /LPF
NEUTROPHILS # BLD AUTO: 4.15 X10*3/UL (ref 1.2–7.7)
NEUTROPHILS NFR BLD AUTO: 56.2 %
NITRITE UR QL STRIP.AUTO: NEGATIVE
NRBC BLD-RTO: 0 /100 WBCS (ref 0–0)
PH UR STRIP.AUTO: 6.5 [PH]
PLATELET # BLD AUTO: 278 X10*3/UL (ref 150–450)
POTASSIUM SERPL-SCNC: 3.6 MMOL/L (ref 3.5–5.3)
PROT SERPL-MCNC: 6.2 G/DL (ref 6.4–8.2)
PROT UR STRIP.AUTO-MCNC: ABNORMAL MG/DL
RBC # BLD AUTO: 3.62 X10*6/UL (ref 4–5.2)
RBC # UR STRIP.AUTO: NEGATIVE /UL
RBC #/AREA URNS AUTO: ABNORMAL /HPF
SODIUM SERPL-SCNC: 143 MMOL/L (ref 136–145)
SP GR UR STRIP.AUTO: 1.03
SQUAMOUS #/AREA URNS AUTO: ABNORMAL /HPF
UROBILINOGEN UR STRIP.AUTO-MCNC: NORMAL MG/DL
WBC # BLD AUTO: 7.4 X10*3/UL (ref 4.4–11.3)
WBC #/AREA URNS AUTO: ABNORMAL /HPF

## 2024-12-27 PROCEDURE — 74177 CT ABD & PELVIS W/CONTRAST: CPT

## 2024-12-27 PROCEDURE — 2550000001 HC RX 255 CONTRASTS: Performed by: NURSE PRACTITIONER

## 2024-12-27 PROCEDURE — 74177 CT ABD & PELVIS W/CONTRAST: CPT | Performed by: RADIOLOGY

## 2024-12-27 PROCEDURE — 96375 TX/PRO/DX INJ NEW DRUG ADDON: CPT

## 2024-12-27 PROCEDURE — 80053 COMPREHEN METABOLIC PANEL: CPT | Performed by: NURSE PRACTITIONER

## 2024-12-27 PROCEDURE — 2500000004 HC RX 250 GENERAL PHARMACY W/ HCPCS (ALT 636 FOR OP/ED): Performed by: NURSE PRACTITIONER

## 2024-12-27 PROCEDURE — 81001 URINALYSIS AUTO W/SCOPE: CPT | Performed by: NURSE PRACTITIONER

## 2024-12-27 PROCEDURE — 96374 THER/PROPH/DIAG INJ IV PUSH: CPT | Mod: 59

## 2024-12-27 PROCEDURE — 81025 URINE PREGNANCY TEST: CPT | Performed by: NURSE PRACTITIONER

## 2024-12-27 PROCEDURE — 87186 SC STD MICRODIL/AGAR DIL: CPT | Mod: PORLAB | Performed by: NURSE PRACTITIONER

## 2024-12-27 PROCEDURE — 85025 COMPLETE CBC W/AUTO DIFF WBC: CPT | Performed by: NURSE PRACTITIONER

## 2024-12-27 PROCEDURE — 36415 COLL VENOUS BLD VENIPUNCTURE: CPT | Performed by: NURSE PRACTITIONER

## 2024-12-27 PROCEDURE — 99285 EMERGENCY DEPT VISIT HI MDM: CPT | Mod: 25 | Performed by: EMERGENCY MEDICINE

## 2024-12-27 RX ORDER — METOCLOPRAMIDE HYDROCHLORIDE 5 MG/ML
10 INJECTION INTRAMUSCULAR; INTRAVENOUS ONCE
Status: COMPLETED | OUTPATIENT
Start: 2024-12-27 | End: 2024-12-27

## 2024-12-27 RX ORDER — ONDANSETRON HYDROCHLORIDE 2 MG/ML
4 INJECTION, SOLUTION INTRAVENOUS ONCE
Status: COMPLETED | OUTPATIENT
Start: 2024-12-27 | End: 2024-12-27

## 2024-12-27 RX ORDER — KETOROLAC TROMETHAMINE 30 MG/ML
30 INJECTION, SOLUTION INTRAMUSCULAR; INTRAVENOUS ONCE
Status: COMPLETED | OUTPATIENT
Start: 2024-12-27 | End: 2024-12-27

## 2024-12-27 RX ORDER — DICYCLOMINE HYDROCHLORIDE 10 MG/1
10 CAPSULE ORAL 4 TIMES DAILY
Qty: 17 CAPSULE | Refills: 0 | Status: SHIPPED | OUTPATIENT
Start: 2024-12-27

## 2024-12-27 RX ORDER — DIPHENHYDRAMINE HYDROCHLORIDE 50 MG/ML
25 INJECTION INTRAMUSCULAR; INTRAVENOUS ONCE
Status: COMPLETED | OUTPATIENT
Start: 2024-12-27 | End: 2024-12-27

## 2024-12-27 RX ADMIN — ONDANSETRON 4 MG: 2 INJECTION INTRAMUSCULAR; INTRAVENOUS at 10:50

## 2024-12-27 RX ADMIN — METOCLOPRAMIDE 10 MG: 5 INJECTION, SOLUTION INTRAMUSCULAR; INTRAVENOUS at 12:03

## 2024-12-27 RX ADMIN — IOHEXOL 75 ML: 350 INJECTION, SOLUTION INTRAVENOUS at 11:52

## 2024-12-27 RX ADMIN — DIPHENHYDRAMINE HYDROCHLORIDE 25 MG: 50 INJECTION, SOLUTION INTRAMUSCULAR; INTRAVENOUS at 12:03

## 2024-12-27 RX ADMIN — KETOROLAC TROMETHAMINE 30 MG: 30 INJECTION, SOLUTION INTRAMUSCULAR at 11:25

## 2024-12-27 ASSESSMENT — PAIN SCALES - GENERAL
PAINLEVEL_OUTOF10: 10 - WORST POSSIBLE PAIN

## 2024-12-27 ASSESSMENT — PAIN DESCRIPTION - PROGRESSION: CLINICAL_PROGRESSION: NOT CHANGED

## 2024-12-27 ASSESSMENT — PAIN - FUNCTIONAL ASSESSMENT
PAIN_FUNCTIONAL_ASSESSMENT: 0-10
PAIN_FUNCTIONAL_ASSESSMENT: 0-10

## 2024-12-27 NOTE — ED PROVIDER NOTES
Chief Complaint   Patient presents with   • Abdominal Pain       HPI       24 year old female presents to the Emergency Department today complaining of RLQ pain that she describes as sharp in nature, constant since this am, non-radiating, and varies in intensity. Reports to having nausea associated with the above. Denies any associated fever, chills, headache, neck pain, chest pain, shortness of breath, vomiting, diarrhea, constipation, hematemesis, hematochezia, melena, or urinary symptoms.       History provided by:  Patient             Patient History   Past Medical History:   Diagnosis Date   • ADHD    • Anxiety    • Depression    • Encounter for  screening for malformations ([facility]) 2022    Encounter for  screening for malformations   • Migraine      Past Surgical History:   Procedure Laterality Date   •  SECTION, LOW TRANSVERSE     • OTHER SURGICAL HISTORY  2022    Foot surgery   • OTHER SURGICAL HISTORY  2022    Dilation and curettage     Family History   Problem Relation Name Age of Onset   • Depression Mother     • Blood clot Mother     • Other (heart condition) Father     • Heart disease Father     • Asthma Sister     • Sleep apnea Mother's Sister     • Spina bifida Mother's Brother     • Diabetes Maternal Grandmother     • Gout Maternal Grandmother     • Diabetes Maternal Grandfather     • Kidney failure Maternal Grandfather     • Spina bifida Other Cousin      Social History     Tobacco Use   • Smoking status: Never   • Smokeless tobacco: Never   Vaping Use   • Vaping status: Never Used   Substance Use Topics   • Alcohol use: Not Currently   • Drug use: Never           Physical Exam  Constitutional:       Appearance: Normal appearance.   HENT:      Head: Normocephalic.      Right Ear: Tympanic membrane, ear canal and external ear normal.      Left Ear: Tympanic membrane, ear canal and external ear normal.      Nose: Nose normal.      Mouth/Throat:      Mouth:  Mucous membranes are moist.      Pharynx: Oropharynx is clear. No oropharyngeal exudate or posterior oropharyngeal erythema.   Eyes:      Conjunctiva/sclera: Conjunctivae normal.      Pupils: Pupils are equal, round, and reactive to light.   Cardiovascular:      Rate and Rhythm: Normal rate and regular rhythm.      Pulses:           Radial pulses are 3+ on the right side and 3+ on the left side.        Dorsalis pedis pulses are 3+ on the right side and 3+ on the left side.      Heart sounds: Normal heart sounds. No murmur heard.     No friction rub. No gallop.   Pulmonary:      Effort: Pulmonary effort is normal. No respiratory distress.      Breath sounds: Normal breath sounds. No wheezing, rhonchi or rales.   Abdominal:      General: Abdomen is flat. Bowel sounds are normal.      Palpations: Abdomen is soft.      Tenderness: There is abdominal tenderness in the right lower quadrant. There is no right CVA tenderness, left CVA tenderness, guarding or rebound. Negative signs include Capps's sign and McBurney's sign.   Musculoskeletal:         General: No swelling or deformity.      Cervical back: Full passive range of motion without pain.      Right lower leg: No edema.      Left lower leg: No edema.   Lymphadenopathy:      Cervical: No cervical adenopathy.   Skin:     Capillary Refill: Capillary refill takes less than 2 seconds.      Coloration: Skin is not jaundiced.      Findings: No rash.   Neurological:      General: No focal deficit present.      Mental Status: She is alert and oriented to person, place, and time. Mental status is at baseline.      Gait: Gait is intact.   Psychiatric:         Mood and Affect: Mood normal.         Behavior: Behavior is cooperative.         Labs Reviewed   CBC WITH AUTO DIFFERENTIAL - Abnormal       Result Value    WBC 7.4      nRBC 0.0      RBC 3.62 (*)     Hemoglobin 11.0 (*)     Hematocrit 32.6 (*)     MCV 90      MCH 30.4      MCHC 33.7      RDW 13.7      Platelets 278       Neutrophils % 56.2      Immature Granulocytes %, Automated 0.3      Lymphocytes % 36.0      Monocytes % 5.2      Eosinophils % 1.6      Basophils % 0.7      Neutrophils Absolute 4.15      Immature Granulocytes Absolute, Automated 0.02      Lymphocytes Absolute 2.65      Monocytes Absolute 0.38      Eosinophils Absolute 0.12      Basophils Absolute 0.05     COMPREHENSIVE METABOLIC PANEL - Abnormal    Glucose 88      Sodium 143      Potassium 3.6      Chloride 107      Bicarbonate 27      Anion Gap 13      Urea Nitrogen 16      Creatinine 0.73      eGFR >90      Calcium 8.7      Albumin 3.8      Alkaline Phosphatase 75      Total Protein 6.2 (*)     AST 9      Bilirubin, Total 0.3      ALT 7     URINALYSIS WITH REFLEX CULTURE AND MICROSCOPIC - Abnormal    Color, Urine Yellow      Appearance, Urine Turbid (*)     Specific Gravity, Urine 1.034      pH, Urine 6.5      Protein, Urine 30 (1+) (*)     Glucose, Urine Normal      Blood, Urine NEGATIVE      Ketones, Urine NEGATIVE      Bilirubin, Urine NEGATIVE      Urobilinogen, Urine Normal      Nitrite, Urine NEGATIVE      Leukocyte Esterase, Urine 75 Altagracia/µL (*)    MICROSCOPIC ONLY, URINE - Abnormal    WBC, Urine 11-20 (*)     RBC, Urine 1-2      Squamous Epithelial Cells, Urine 51-75 (2+)      Bacteria, Urine 2+ (*)     Mucus, Urine 4+     HCG, URINE, QUALITATIVE - Normal    HCG, Urine NEGATIVE     URINE CULTURE   URINALYSIS WITH REFLEX CULTURE AND MICROSCOPIC    Narrative:     The following orders were created for panel order Urinalysis with Reflex Culture and Microscopic.  Procedure                               Abnormality         Status                     ---------                               -----------         ------                     Urinalysis with Reflex C...[[id number]]  Abnormal            Final result               Extra Urine Gray Tube[[id number]]                            In process                   Please view results for these tests on the individual  orders.   EXTRA URINE GRAY TUBE       CT abdomen pelvis w IV contrast   Final Result   1. No evidence of bowel obstruction, free intraperitoneal air or   abnormal intra-abdominal fluid collection.        MACRO:   None        Signed by: Naresh Melvin 12/27/2024 12:49 PM   Dictation workstation:   [id number]               ED Course & MDM   ED Course as of 12/27/24 1349   Fri Dec 27, 2024   1147 Upon reassessment the patient remains nauseated with a headache and abdominal discomfort. Reglan and Benadryl ordered. [JZ]      ED Course User Index  [JZ] Esteban ANALIA Lindsay, APRN-CNP         Diagnoses as of 12/27/24 1349   Right lower quadrant abdominal pain           Medical Decision Making  Patient was seen and evaluated by Dr. Fernandez. Saline lock was established with labs drawn and results as above. Given 1 Liter of normal saline wide open over 1 hour. Given Zofran and Toradol as well. After receiving the medications and IV fluids, reported feeling improved. Later, she was given Reglan and Benadryl with improvement. Anemia is stable. Remainder of blood counts, electrolytes, kidney function, and liver function were unremarkable. Urine pregnancy was negative. Urinalysis was contaminated. CT scan of her abdomen and pelvis with contrast shows no evidence of bowel obstruction, free intraperitoneal air or abnormal intra-abdominal fluid collection. Repeat abdominal evaluation reveals an abdomen soft, nondistended, and nontender to palpation. There was no rebound, rigidity, or guarding noted. There were no peritoneal signs noted. Although, they could not fully visualize the appendix, there no secondary signs of appendicitis on the CT scan. Continued to have multiple benign serial abdominal exams. At this time, we find no underlying evidence of acute pancreatitis, cholecystitis, cholangitis, diverticulitis, or appendicitis. No evidence of kidney stone or pyelonephritis. To continue to take the Zofran as previously directed. Given a  prescription for Bentyl Follow up with their doctor in 3 days. Return if worse in any way. Discharged in stable condition with computer instructions.    Diagnostic Impression:     1. Acute RLQ pain    2. IV meds and fluids in ED     3. Prescription therapy              Your medication list        ASK your doctor about these medications        Instructions Last Dose Given Next Dose Due   HYDROcodone-acetaminophen 5-325 mg tablet  Commonly known as: Norco  Ask about: Should I take this medication?      Take 1 tablet by mouth every 6 hours if needed for severe pain (7 - 10) for up to 2 days.       ondansetron ODT 4 mg disintegrating tablet  Commonly known as: Zofran-ODT      Dissolve 1 tablet (4 mg) in the mouth every 8 hours if needed for nausea or vomiting for up to 7 days.       pantoprazole 40 mg EC tablet  Commonly known as: Protonix      Take 1 tablet (40 mg) by mouth once daily in the morning. Take before meals. Do not crush, chew, or split.                  Procedure  Procedures     Esteban Lindsay, APRN-CNP  12/27/24 6354

## 2024-12-28 ENCOUNTER — HOSPITAL ENCOUNTER (EMERGENCY)
Facility: HOSPITAL | Age: 24
Discharge: HOME | End: 2024-12-28
Attending: STUDENT IN AN ORGANIZED HEALTH CARE EDUCATION/TRAINING PROGRAM
Payer: COMMERCIAL

## 2024-12-28 ENCOUNTER — APPOINTMENT (OUTPATIENT)
Dept: RADIOLOGY | Facility: HOSPITAL | Age: 24
End: 2024-12-28
Payer: COMMERCIAL

## 2024-12-28 VITALS
OXYGEN SATURATION: 99 % | BODY MASS INDEX: 32.44 KG/M2 | HEIGHT: 64 IN | HEART RATE: 83 BPM | SYSTOLIC BLOOD PRESSURE: 127 MMHG | RESPIRATION RATE: 20 BRPM | TEMPERATURE: 98 F | DIASTOLIC BLOOD PRESSURE: 85 MMHG | WEIGHT: 190 LBS

## 2024-12-28 DIAGNOSIS — N39.0 URINARY TRACT INFECTION WITHOUT HEMATURIA, SITE UNSPECIFIED: ICD-10-CM

## 2024-12-28 DIAGNOSIS — R10.31 RIGHT LOWER QUADRANT ABDOMINAL PAIN: Primary | ICD-10-CM

## 2024-12-28 LAB
ALBUMIN SERPL BCP-MCNC: 3.7 G/DL (ref 3.4–5)
ALP SERPL-CCNC: 73 U/L (ref 33–110)
ALT SERPL W P-5'-P-CCNC: 8 U/L (ref 7–45)
ANION GAP SERPL CALC-SCNC: 10 MMOL/L (ref 10–20)
APPEARANCE UR: ABNORMAL
AST SERPL W P-5'-P-CCNC: 9 U/L (ref 9–39)
BACTERIA #/AREA URNS AUTO: ABNORMAL /HPF
BASOPHILS # BLD AUTO: 0.04 X10*3/UL (ref 0–0.1)
BASOPHILS NFR BLD AUTO: 0.6 %
BILIRUB SERPL-MCNC: 0.2 MG/DL (ref 0–1.2)
BILIRUB UR STRIP.AUTO-MCNC: NEGATIVE MG/DL
BUN SERPL-MCNC: 18 MG/DL (ref 6–23)
CALCIUM SERPL-MCNC: 8.8 MG/DL (ref 8.6–10.3)
CHLORIDE SERPL-SCNC: 105 MMOL/L (ref 98–107)
CO2 SERPL-SCNC: 28 MMOL/L (ref 21–32)
COLOR UR: YELLOW
CREAT SERPL-MCNC: 0.69 MG/DL (ref 0.5–1.05)
EGFRCR SERPLBLD CKD-EPI 2021: >90 ML/MIN/1.73M*2
EOSINOPHIL # BLD AUTO: 0.19 X10*3/UL (ref 0–0.7)
EOSINOPHIL NFR BLD AUTO: 2.9 %
ERYTHROCYTE [DISTWIDTH] IN BLOOD BY AUTOMATED COUNT: 13.6 % (ref 11.5–14.5)
GLUCOSE SERPL-MCNC: 90 MG/DL (ref 74–99)
GLUCOSE UR STRIP.AUTO-MCNC: NORMAL MG/DL
HCG UR QL IA.RAPID: NEGATIVE
HCT VFR BLD AUTO: 32.1 % (ref 36–46)
HGB BLD-MCNC: 10.6 G/DL (ref 12–16)
IMM GRANULOCYTES # BLD AUTO: 0.02 X10*3/UL (ref 0–0.7)
IMM GRANULOCYTES NFR BLD AUTO: 0.3 % (ref 0–0.9)
KETONES UR STRIP.AUTO-MCNC: NEGATIVE MG/DL
LEUKOCYTE ESTERASE UR QL STRIP.AUTO: ABNORMAL
LYMPHOCYTES # BLD AUTO: 2.36 X10*3/UL (ref 1.2–4.8)
LYMPHOCYTES NFR BLD AUTO: 35.7 %
MCH RBC QN AUTO: 29.9 PG (ref 26–34)
MCHC RBC AUTO-ENTMCNC: 33 G/DL (ref 32–36)
MCV RBC AUTO: 91 FL (ref 80–100)
MONOCYTES # BLD AUTO: 0.36 X10*3/UL (ref 0.1–1)
MONOCYTES NFR BLD AUTO: 5.4 %
MUCOUS THREADS #/AREA URNS AUTO: ABNORMAL /LPF
NEUTROPHILS # BLD AUTO: 3.64 X10*3/UL (ref 1.2–7.7)
NEUTROPHILS NFR BLD AUTO: 55.1 %
NITRITE UR QL STRIP.AUTO: NEGATIVE
NRBC BLD-RTO: 0 /100 WBCS (ref 0–0)
PH UR STRIP.AUTO: 6 [PH]
PLATELET # BLD AUTO: 264 X10*3/UL (ref 150–450)
POTASSIUM SERPL-SCNC: 3.4 MMOL/L (ref 3.5–5.3)
PROT SERPL-MCNC: 6.1 G/DL (ref 6.4–8.2)
PROT UR STRIP.AUTO-MCNC: ABNORMAL MG/DL
RBC # BLD AUTO: 3.54 X10*6/UL (ref 4–5.2)
RBC # UR STRIP.AUTO: ABNORMAL /UL
RBC #/AREA URNS AUTO: ABNORMAL /HPF
SODIUM SERPL-SCNC: 140 MMOL/L (ref 136–145)
SP GR UR STRIP.AUTO: 1.04
SQUAMOUS #/AREA URNS AUTO: ABNORMAL /HPF
UROBILINOGEN UR STRIP.AUTO-MCNC: NORMAL MG/DL
WBC # BLD AUTO: 6.6 X10*3/UL (ref 4.4–11.3)
WBC #/AREA URNS AUTO: ABNORMAL /HPF

## 2024-12-28 PROCEDURE — 2500000004 HC RX 250 GENERAL PHARMACY W/ HCPCS (ALT 636 FOR OP/ED): Performed by: STUDENT IN AN ORGANIZED HEALTH CARE EDUCATION/TRAINING PROGRAM

## 2024-12-28 PROCEDURE — 81025 URINE PREGNANCY TEST: CPT | Performed by: STUDENT IN AN ORGANIZED HEALTH CARE EDUCATION/TRAINING PROGRAM

## 2024-12-28 PROCEDURE — 87086 URINE CULTURE/COLONY COUNT: CPT | Mod: PORLAB | Performed by: STUDENT IN AN ORGANIZED HEALTH CARE EDUCATION/TRAINING PROGRAM

## 2024-12-28 PROCEDURE — 2500000001 HC RX 250 WO HCPCS SELF ADMINISTERED DRUGS (ALT 637 FOR MEDICARE OP): Performed by: STUDENT IN AN ORGANIZED HEALTH CARE EDUCATION/TRAINING PROGRAM

## 2024-12-28 PROCEDURE — 99284 EMERGENCY DEPT VISIT MOD MDM: CPT | Mod: 25 | Performed by: STUDENT IN AN ORGANIZED HEALTH CARE EDUCATION/TRAINING PROGRAM

## 2024-12-28 PROCEDURE — 85025 COMPLETE CBC W/AUTO DIFF WBC: CPT | Performed by: STUDENT IN AN ORGANIZED HEALTH CARE EDUCATION/TRAINING PROGRAM

## 2024-12-28 PROCEDURE — 76830 TRANSVAGINAL US NON-OB: CPT | Performed by: RADIOLOGY

## 2024-12-28 PROCEDURE — 96374 THER/PROPH/DIAG INJ IV PUSH: CPT

## 2024-12-28 PROCEDURE — 80053 COMPREHEN METABOLIC PANEL: CPT | Performed by: STUDENT IN AN ORGANIZED HEALTH CARE EDUCATION/TRAINING PROGRAM

## 2024-12-28 PROCEDURE — 76856 US EXAM PELVIC COMPLETE: CPT | Performed by: RADIOLOGY

## 2024-12-28 PROCEDURE — 76830 TRANSVAGINAL US NON-OB: CPT

## 2024-12-28 PROCEDURE — 81001 URINALYSIS AUTO W/SCOPE: CPT | Performed by: STUDENT IN AN ORGANIZED HEALTH CARE EDUCATION/TRAINING PROGRAM

## 2024-12-28 PROCEDURE — 36415 COLL VENOUS BLD VENIPUNCTURE: CPT | Performed by: STUDENT IN AN ORGANIZED HEALTH CARE EDUCATION/TRAINING PROGRAM

## 2024-12-28 RX ORDER — CEPHALEXIN 250 MG/1
500 CAPSULE ORAL ONCE
Status: COMPLETED | OUTPATIENT
Start: 2024-12-28 | End: 2024-12-28

## 2024-12-28 RX ORDER — ACETAMINOPHEN 325 MG/1
975 TABLET ORAL ONCE
Status: COMPLETED | OUTPATIENT
Start: 2024-12-28 | End: 2024-12-28

## 2024-12-28 RX ORDER — CEPHALEXIN 500 MG/1
500 CAPSULE ORAL 2 TIMES DAILY
Qty: 10 CAPSULE | Refills: 0 | Status: SHIPPED | OUTPATIENT
Start: 2024-12-28 | End: 2024-12-31 | Stop reason: ALTCHOICE

## 2024-12-28 RX ORDER — KETOROLAC TROMETHAMINE 30 MG/ML
15 INJECTION, SOLUTION INTRAMUSCULAR; INTRAVENOUS ONCE
Status: COMPLETED | OUTPATIENT
Start: 2024-12-28 | End: 2024-12-28

## 2024-12-28 RX ADMIN — ACETAMINOPHEN 975 MG: 325 TABLET ORAL at 20:08

## 2024-12-28 RX ADMIN — KETOROLAC TROMETHAMINE 15 MG: 30 INJECTION, SOLUTION INTRAMUSCULAR at 17:39

## 2024-12-28 RX ADMIN — CEPHALEXIN 500 MG: 250 CAPSULE ORAL at 20:08

## 2024-12-28 ASSESSMENT — PAIN - FUNCTIONAL ASSESSMENT: PAIN_FUNCTIONAL_ASSESSMENT: 0-10

## 2024-12-28 ASSESSMENT — PAIN SCALES - GENERAL: PAINLEVEL_OUTOF10: 7

## 2024-12-28 NOTE — ED PROVIDER NOTES
HPI   Chief Complaint   Patient presents with    Abdominal Pain       This is a 24-year-old female who was seen here on the  for complaints of right lower quadrant pain.  She she states that she has had to see a specialist for abdominal discomfort in the past but this is different.  She has been on antibiotics in the past.  Yesterday patient's blood work was reassuring she had no leukocytosis or left shift urine is not treated and culture is pending.  CT of her abdomen showed no acute pathology.  She states that the pain has now worsened                          Mirna Coma Scale Score: 15                  Patient History   Past Medical History:   Diagnosis Date    ADHD     Anxiety     Depression     Encounter for  screening for malformations 2022    Encounter for  screening for malformations    Migraine      Past Surgical History:   Procedure Laterality Date     SECTION, LOW TRANSVERSE      OTHER SURGICAL HISTORY  2022    Foot surgery    OTHER SURGICAL HISTORY  2022    Dilation and curettage     Family History   Problem Relation Name Age of Onset    Depression Mother      Blood clot Mother      Other (heart condition) Father      Heart disease Father      Asthma Sister      Sleep apnea Mother's Sister      Spina bifida Mother's Brother      Diabetes Maternal Grandmother      Gout Maternal Grandmother      Diabetes Maternal Grandfather      Kidney failure Maternal Grandfather      Spina bifida Other Cousin      Social History     Tobacco Use    Smoking status: Never    Smokeless tobacco: Never   Vaping Use    Vaping status: Never Used   Substance Use Topics    Alcohol use: Not Currently    Drug use: Never       Physical Exam   ED Triage Vitals [24 1542]   Temperature Heart Rate Respirations BP   36.7 °C (98 °F) 83 20 127/85      Pulse Ox Temp src Heart Rate Source Patient Position   99 % -- -- --      BP Location FiO2 (%)     -- --       Physical  Exam  Constitutional:       General: She is not in acute distress.  HENT:      Head: Normocephalic and atraumatic.      Right Ear: Tympanic membrane normal.      Left Ear: Tympanic membrane normal.      Mouth/Throat:      Mouth: Mucous membranes are moist.   Eyes:      Extraocular Movements: Extraocular movements intact.      Conjunctiva/sclera: Conjunctivae normal.      Pupils: Pupils are equal, round, and reactive to light.   Cardiovascular:      Rate and Rhythm: Normal rate and regular rhythm.      Heart sounds: No murmur heard.  Pulmonary:      Effort: Pulmonary effort is normal. No respiratory distress.      Breath sounds: Normal breath sounds. No stridor. No wheezing or rales.   Abdominal:      General: Bowel sounds are normal. There is no distension.      Tenderness: There is abdominal tenderness in the right lower quadrant. There is no guarding or rebound.   Musculoskeletal:         General: No swelling, tenderness or deformity. Normal range of motion.   Skin:     General: Skin is warm and dry.      Coloration: Skin is not jaundiced.      Findings: No bruising or lesion.   Neurological:      General: No focal deficit present.      Mental Status: She is alert and oriented to person, place, and time. Mental status is at baseline.      Cranial Nerves: No cranial nerve deficit.      Motor: No weakness.   Psychiatric:         Mood and Affect: Mood normal.       Labs Reviewed - No data to display  No orders to display       ED Course & MDM   ED Course as of 12/28/24 2013   Sat Dec 28, 2024   1940 IMPRESSION:  1. No acute pelvic abnormality detected. Appropriately positioned  intrauterine device.  2. Small volume pelvic free fluid.   [CF]      ED Course User Index  [CF] Rossy Schwartz MD       Medical Decision Making  24-year-old female plans were department for abdominal pain in her right lower quadrant.  She was seen here yesterday had a CT done that showed no intra-abdominal pathology or signs of  appendicitis.  Showed no leukocytosis or left shift.  Here she is here to palpation in her right lower quadrant.  She was offered STD testing since she sexually active but declined at this time.    Patient continues to have no leukocytosis or left shift labs are otherwise reassuring.  Ultrasound shows no acute pelvic abnormality no signs of torsion.  He does appear to have urinary tract infection was given 1 dose of Keflex.  I have low suspicion for intra-abdominal pathology given that she had a CT yesterday that showed no symptoms and her labs are reassuring no signs of biliary pathology on her lab workup she has no tender ovation of right upper quadrant.  At this time she is safe for discharge home and will follow-up with her primary care provider.        Procedure  Procedures     Rossy Schwartz MD  12/28/24 2013

## 2024-12-29 ENCOUNTER — APPOINTMENT (OUTPATIENT)
Dept: RADIOLOGY | Facility: HOSPITAL | Age: 24
End: 2024-12-29
Payer: COMMERCIAL

## 2024-12-29 ENCOUNTER — HOSPITAL ENCOUNTER (EMERGENCY)
Facility: HOSPITAL | Age: 24
Discharge: HOME | End: 2024-12-29
Attending: STUDENT IN AN ORGANIZED HEALTH CARE EDUCATION/TRAINING PROGRAM
Payer: COMMERCIAL

## 2024-12-29 VITALS
DIASTOLIC BLOOD PRESSURE: 74 MMHG | TEMPERATURE: 97.5 F | OXYGEN SATURATION: 98 % | SYSTOLIC BLOOD PRESSURE: 128 MMHG | HEART RATE: 72 BPM | RESPIRATION RATE: 17 BRPM
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
## 2024-12-29 DIAGNOSIS — M79.18 MUSCULOSKELETAL PAIN: ICD-10-CM

## 2024-12-29 DIAGNOSIS — S09.90XA HEAD INJURY, INITIAL ENCOUNTER: Primary | ICD-10-CM

## 2024-12-29 LAB
ABO GROUP (TYPE) IN BLOOD: NORMAL
ALBUMIN SERPL BCP-MCNC: 3.9 G/DL (ref 3.4–5)
ALP SERPL-CCNC: 84 U/L (ref 33–110)
ALT SERPL W P-5'-P-CCNC: 8 U/L (ref 7–45)
ANION GAP SERPL CALC-SCNC: 10 MMOL/L (ref 10–20)
ANTIBODY SCREEN: NORMAL
AST SERPL W P-5'-P-CCNC: 16 U/L (ref 9–39)
BACTERIA UR CULT: ABNORMAL
BASOPHILS # BLD AUTO: 0.05 X10*3/UL (ref 0–0.1)
BASOPHILS NFR BLD AUTO: 0.7 %
BILIRUB SERPL-MCNC: 0.4 MG/DL (ref 0–1.2)
BUN SERPL-MCNC: 12 MG/DL (ref 6–23)
CALCIUM SERPL-MCNC: 9.4 MG/DL (ref 8.6–10.3)
CHLORIDE SERPL-SCNC: 107 MMOL/L (ref 98–107)
CO2 SERPL-SCNC: 26 MMOL/L (ref 21–32)
CREAT SERPL-MCNC: 0.64 MG/DL (ref 0.5–1.05)
EGFRCR SERPLBLD CKD-EPI 2021: >90 ML/MIN/1.73M*2
EOSINOPHIL # BLD AUTO: 0.12 X10*3/UL (ref 0–0.7)
EOSINOPHIL NFR BLD AUTO: 1.6 %
ERYTHROCYTE [DISTWIDTH] IN BLOOD BY AUTOMATED COUNT: 13.2 % (ref 11.5–14.5)
ETHANOL SERPL-MCNC: <10 MG/DL
GLUCOSE SERPL-MCNC: 89 MG/DL (ref 74–99)
HCT VFR BLD AUTO: 32 % (ref 36–46)
HGB BLD-MCNC: 10.9 G/DL (ref 12–16)
HOLD SPECIMEN: NORMAL
IMM GRANULOCYTES # BLD AUTO: 0.02 X10*3/UL (ref 0–0.7)
IMM GRANULOCYTES NFR BLD AUTO: 0.3 % (ref 0–0.9)
INR PPP: 1.1 (ref 0.9–1.1)
LACTATE SERPL-SCNC: 0.5 MMOL/L (ref 0.4–2)
LYMPHOCYTES # BLD AUTO: 2.49 X10*3/UL (ref 1.2–4.8)
LYMPHOCYTES NFR BLD AUTO: 33.6 %
MCH RBC QN AUTO: 30.3 PG (ref 26–34)
MCHC RBC AUTO-ENTMCNC: 34.1 G/DL (ref 32–36)
MCV RBC AUTO: 89 FL (ref 80–100)
MONOCYTES # BLD AUTO: 0.33 X10*3/UL (ref 0.1–1)
MONOCYTES NFR BLD AUTO: 4.5 %
NEUTROPHILS # BLD AUTO: 4.39 X10*3/UL (ref 1.2–7.7)
NEUTROPHILS NFR BLD AUTO: 59.3 %
NRBC BLD-RTO: 0 /100 WBCS (ref 0–0)
PLATELET # BLD AUTO: 281 X10*3/UL (ref 150–450)
POTASSIUM SERPL-SCNC: 4.2 MMOL/L (ref 3.5–5.3)
PROT SERPL-MCNC: 6.7 G/DL (ref 6.4–8.2)
PROTHROMBIN TIME: 12.7 SECONDS (ref 9.8–12.8)
RBC # BLD AUTO: 3.6 X10*6/UL (ref 4–5.2)
RH FACTOR (ANTIGEN D): NORMAL
SODIUM SERPL-SCNC: 139 MMOL/L (ref 136–145)
WBC # BLD AUTO: 7.4 X10*3/UL (ref 4.4–11.3)

## 2024-12-29 PROCEDURE — 85610 PROTHROMBIN TIME: CPT | Performed by: STUDENT IN AN ORGANIZED HEALTH CARE EDUCATION/TRAINING PROGRAM

## 2024-12-29 PROCEDURE — 70450 CT HEAD/BRAIN W/O DYE: CPT

## 2024-12-29 PROCEDURE — 71046 X-RAY EXAM CHEST 2 VIEWS: CPT | Performed by: RADIOLOGY

## 2024-12-29 PROCEDURE — 70450 CT HEAD/BRAIN W/O DYE: CPT | Performed by: RADIOLOGY

## 2024-12-29 PROCEDURE — 72125 CT NECK SPINE W/O DYE: CPT

## 2024-12-29 PROCEDURE — 80053 COMPREHEN METABOLIC PANEL: CPT | Performed by: STUDENT IN AN ORGANIZED HEALTH CARE EDUCATION/TRAINING PROGRAM

## 2024-12-29 PROCEDURE — 36415 COLL VENOUS BLD VENIPUNCTURE: CPT | Performed by: STUDENT IN AN ORGANIZED HEALTH CARE EDUCATION/TRAINING PROGRAM

## 2024-12-29 PROCEDURE — 82077 ASSAY SPEC XCP UR&BREATH IA: CPT | Performed by: STUDENT IN AN ORGANIZED HEALTH CARE EDUCATION/TRAINING PROGRAM

## 2024-12-29 PROCEDURE — 73564 X-RAY EXAM KNEE 4 OR MORE: CPT | Mod: RT

## 2024-12-29 PROCEDURE — 72125 CT NECK SPINE W/O DYE: CPT | Performed by: RADIOLOGY

## 2024-12-29 PROCEDURE — 99285 EMERGENCY DEPT VISIT HI MDM: CPT | Mod: 25

## 2024-12-29 PROCEDURE — 73030 X-RAY EXAM OF SHOULDER: CPT | Mod: RIGHT SIDE | Performed by: RADIOLOGY

## 2024-12-29 PROCEDURE — 85025 COMPLETE CBC W/AUTO DIFF WBC: CPT | Performed by: STUDENT IN AN ORGANIZED HEALTH CARE EDUCATION/TRAINING PROGRAM

## 2024-12-29 PROCEDURE — 73030 X-RAY EXAM OF SHOULDER: CPT | Mod: RT

## 2024-12-29 PROCEDURE — 83605 ASSAY OF LACTIC ACID: CPT | Performed by: STUDENT IN AN ORGANIZED HEALTH CARE EDUCATION/TRAINING PROGRAM

## 2024-12-29 PROCEDURE — 99285 EMERGENCY DEPT VISIT HI MDM: CPT | Mod: 25 | Performed by: STUDENT IN AN ORGANIZED HEALTH CARE EDUCATION/TRAINING PROGRAM

## 2024-12-29 PROCEDURE — 96374 THER/PROPH/DIAG INJ IV PUSH: CPT | Performed by: STUDENT IN AN ORGANIZED HEALTH CARE EDUCATION/TRAINING PROGRAM

## 2024-12-29 PROCEDURE — 99291 CRITICAL CARE FIRST HOUR: CPT | Performed by: STUDENT IN AN ORGANIZED HEALTH CARE EDUCATION/TRAINING PROGRAM

## 2024-12-29 PROCEDURE — 73564 X-RAY EXAM KNEE 4 OR MORE: CPT | Mod: RIGHT SIDE | Performed by: RADIOLOGY

## 2024-12-29 PROCEDURE — 86901 BLOOD TYPING SEROLOGIC RH(D): CPT | Performed by: STUDENT IN AN ORGANIZED HEALTH CARE EDUCATION/TRAINING PROGRAM

## 2024-12-29 PROCEDURE — 71046 X-RAY EXAM CHEST 2 VIEWS: CPT

## 2024-12-29 PROCEDURE — 2500000004 HC RX 250 GENERAL PHARMACY W/ HCPCS (ALT 636 FOR OP/ED): Performed by: STUDENT IN AN ORGANIZED HEALTH CARE EDUCATION/TRAINING PROGRAM

## 2024-12-29 RX ORDER — DROPERIDOL 2.5 MG/ML
2.5 INJECTION, SOLUTION INTRAMUSCULAR; INTRAVENOUS ONCE
Status: COMPLETED | OUTPATIENT
Start: 2024-12-29 | End: 2024-12-29

## 2024-12-29 RX ADMIN — DROPERIDOL 2.5 MG: 2.5 INJECTION, SOLUTION INTRAMUSCULAR; INTRAVENOUS at 14:56

## 2024-12-29 ASSESSMENT — LIFESTYLE VARIABLES
EVER HAD A DRINK FIRST THING IN THE MORNING TO STEADY YOUR NERVES TO GET RID OF A HANGOVER: NO
HAVE PEOPLE ANNOYED YOU BY CRITICIZING YOUR DRINKING: NO
EVER FELT BAD OR GUILTY ABOUT YOUR DRINKING: NO
HAVE YOU EVER FELT YOU SHOULD CUT DOWN ON YOUR DRINKING: NO
TOTAL SCORE: 0

## 2024-12-29 ASSESSMENT — PAIN - FUNCTIONAL ASSESSMENT: PAIN_FUNCTIONAL_ASSESSMENT: 0-10

## 2024-12-29 ASSESSMENT — PAIN SCALES - GENERAL
PAINLEVEL_OUTOF10: 10 - WORST POSSIBLE PAIN
PAINLEVEL_OUTOF10: 0 - NO PAIN
PAINLEVEL_OUTOF10: 10 - WORST POSSIBLE PAIN

## 2024-12-29 NOTE — ED NOTES
Float Nurse: Holly ZAMORANO     Patient activated as a limited trauma due to a +LOC.  No rooms available at this time - patient was taken to CT and XRAY via wheelchair.  Patient alert and oriented at this time.  Patient was able to transfer to and come CT/Xray tables.      Patient given report to Gwen Nelson RN  12/29/24 9808

## 2024-12-29 NOTE — ED PROVIDER NOTES
HPI   Chief Complaint   Patient presents with    Head Injury     Pt passed out and fell down bout 4 steps. Pt states she slipped on her socks. Pt remembers waking up with her back against the wall       HPI:   Patient is a 24-year-old female who is presenting to the emergency department through triage, activated as a limited trauma activation after a fall.  Patient fell down 4 steps, smacked her head.  Patient had a positive loss of consciousness, she is not on thinners.   states that she has been confused since hitting her head.  She believes it was mechanical fall slipping on her socks.  She is complaining of pain to the back of her head, right shoulder, right knee, and chest wall.    ROS: 12 point review of systems was unable to be performed secondary to the patient's urgent presentation    PMH: Unknown at this time    PSH: Unknown at this time    SH: Patient currently breast-feeding, no reported alcohol or illicits    FH: Reviewed as noncontributory to the patient's current complaint    Allergies: No known drug allergies    Medications:  Primary Survey:  A: intact airway  B: equal breath sounds bilaterally  C: 2+ distal pulses palpable in radials, femorals and DP/PTs bilaterally  D: GCS of 14, 1 off for confusion  E: No rashes, lacerations or bruises    Vitals: Hemodynamically stable    Secondary Survey:  NEURO: GCS is 14, 1 off for confusion, she does follow commands and moves all 4 extremities equally and spontaneously, cranial nerves II through XII are grossly intact  HEAD: Traumatic with a small hematoma to the posterior occiput.  Midface is stable.  No evidence of intraoral trauma  EENT: PERRL, EOMI. Canals without blood or CSF drainage, TMs clear, external ear without laceration. Nasal septum midline, no crepitus or septal hematoma. Oral mucosa and tongue without lacerations, teeth in place.   NECK: Patient has no midline tenderness, step-offs or deformities.  Trachea is  GI Follow-up Note  6/27/2024    PATIENT:  Quincy Otto  MRN:  [id number]  ADMIT DATE: 6/24/2024  HOSPITAL STAY:   LOS: 3 days   REASON FOR HOSPITALIZATION:  palpitations    Subjective:  Pt intubated. No GI issues reported.     Objective:    I/O's    Intake/Output Summary (Last 24 hours) at 6/27/2024 1421  Last data filed at 6/27/2024 1100  Gross per 24 hour   Intake 5030.06 ml   Output 1645 ml   Net 3385.06 ml       Physical Exam:  Vital Signs: /78   Pulse 67   Temp 96.5 °F (35.8 °C) (Axillary)   Resp 20   Ht 5' 6.93\" (1.7 m)   Wt 73.3 kg (161 lb 9.6 oz)   BMI 25.36 kg/m²   BSA 1.85 m²   General: intubated  Head: Normocephalic. Atraumatic.   Eyes: Normal conjunctivae and sclerae.  PERRL.  EOMI.  ENT: Oropharynx clear, moist mucous membranes.  Neck: Symmetric without swelling or tenderness.  No masses.  Cardiovascular: Regular rate and rhythm. No murmurs.   Respiratory: Respiratory effort normal. CTA.    Gastrointestinal:  +BS, soft, non-tender, non-distended. No HSM. No masses.    Extremities: No edema. Normal ROM.    Neurologic: intubated  Skin: Warm and dry. No rashes.    Labs   CBC  Recent Labs   Lab 06/27/24  0801 06/26/24  1148 06/25/24  0740   WBC 11.8* 9.1 15.1*   HCT 30.0* 33.1* 36.0*   HGB 9.8* 10.9* 12.0*   PLT 97* 116* 72*       CMP  Recent Labs   Lab 06/27/24  0801 06/25/24  0624 06/24/24  1158   SODIUM 122* 131* 133*   POTASSIUM 5.3* 3.7 3.5   CHLORIDE 94* 96* 92*   CO2 19* 21 17*   GLUCOSE 123* 85 87   BUN 28* 17 9   CREATININE 1.47* 1.67* 1.06   CALCIUM 6.1* 7.6* 8.7   TOTPROTEIN 6.0* 7.2 8.1   ALBUMIN 2.3* 3.2* 3.5*   BILIRUBIN 2.5* 2.0* 4.7*   * 328* 124*   * 140* 72*   ALKPT 63 74 121*       PT/INR:  Recent Labs   Lab 06/25/24  0624 06/24/24  1158   INR 1.5 1.3         Imaging      US LIVER GALLBLADDER PANCREAS (RUQ)    Result Date: 6/24/2024  EXAM: US LIVER GALLBLADDER PANCREAS (RUQ) CLINICAL INDICATION: 46 years-old Male, abnormal liver function tests, I48.92  Unspecified atrial flutter  (CMD) COMPARISON: 4/1/2024. FINDINGS: The pancreas is mostly obscured by bowel gas and not seen. Hepatic echogenicity is moderately increased.  The liver is normal in size and echogenicity without intrahepatic biliary ductal dilatation.  There is no intrahepatic biliary ductal dilatation. The liver measures 15.9 cm in length.  The portal vein is patent and hepatopetal. The gallbladder has a normal sonographic appearance without gallbladder stones, gallbladder wall thickening, or pericholecystic fluid. Sonographic Mccain's sign is negative. There is no extrahepatic biliary ductal dilatation, the visualized common bile duct measures 3 mm. Limited images of the right kidney do not demonstrate any hydronephrosis. The right kidney measures 10.7 cm in length     1.   Increased hepatic echogenicity which can be seen with fatty infiltration or other hepatocellular disease. 2.   Otherwise unremarkable sonographic appearance of the visualized right upper quadrant. FOR PHYSICIAN USE ONLY - Please note that this report was generated using voice recognition software.  If you require clarification or feel that there has been an error in this report please contact me through [employer].  Thank you very much for allowing me to participate in the care of your patient.  Electronically Signed by: AGUSTO POTTER M.D. Signed on: 6/24/2024 6:05 PM Workstation ID: [id number]    CT HEAD WO CONTRAST    Result Date: 6/24/2024  EXAM: CT HEAD WO CONTRAST History:  Age: 46 years , Gender: Male, Complaint: Seizure Comparison Study: None available at time of dictation. Did a Tele-radiologist issue a preliminary report?: No. TECHNIQUE:  CT of the head without intravenous contrast according to standard protocol. Axial, coronal and sagittal multiplanar reformats were performed by the technologist on a separate workstation. DOSE REDUCTION:  All CT scans at this facility use dose modulation, iterative  midline  RESPIRATORY/CHEST: No abrasions, contusions, crepitus.  Mild tenderness to palpation over the superior anterior chest wall.  Non-labored, equal chest expansion, CTAB, no W/R/R.  CV: RRR, nml S1 and S2, no M/R/G. Pulses bilateral: 2+ radial, 2+DP, 2+PT,  2+femoral and 2+ carotid.   ABDOMEN: soft, nontender, nondistended. No scars, abrasions or lacerations.  PELVIS: Stable to compression  : nml external genitalia, no blood at urethral meatus  RECTAL: rectal tone intact with no gross blood noted on exam.  BACK/SPINE: No thoracic midline tenderness, step-offs or deformities. No lumbar midline tenderness, step-offs, or deformities.  No abrasions, hematomas or lacerations noted.   EXTREMITIES: No edema or cyanosis.  Patient has full passive range of motion of the extremities however she has tenderness to palpation over the right shoulder and right knee without any overlying erythema, bruising, or lacerations.    Assessment: Patient is presenting to the emergency department activated as a limited trauma activation through triage after a fall, hit her head with a positive loss of consciousness.  CT imaging was immediately ordered of the head and the C-spine, additional x-ray imaging where the patient is tender was also ordered.  Differential for the patient's head trauma includes but are not limited to potential mild concussion, intracranial hemorrhage, skull fracture, migraine or tension headache to name a few.  CTs are negative for any evidence of any acute pathology, x-ray imaging is also negative for any acute pathology.  Trauma was stood down, she is still complaining of a headache at this time raising concern for potential concussion versus migraine headache, IV droperidol was ordered after an EKG was obtained.  Patient's EKG demonstrates sinus rhythm with ventricular rate of 62, normal axis, normal intervals, no evidence of an acute STEMI.  Prior to me being able to reevaluate the patient, she informed  nursing that she wanted to leave.  Patient ultimately left without treatment complete prior to my reevaluation of the patient and her symptoms. Trauma surgeon Dr. Najera was notifed of the patient's presentation, work-up, and disposition.    Clinical injuries: Fall, head injury with positive LOC, musculoskeletal pain    Dispo: Left without treatment complete      Keenan Neff MD            History provided by:  Patient                        No data recorded                  Patient History   Past Medical History:   Diagnosis Date    ADHD     Anxiety     Depression     Encounter for  screening for malformations 2022    Encounter for  screening for malformations    Migraine      Past Surgical History:   Procedure Laterality Date     SECTION, LOW TRANSVERSE      OTHER SURGICAL HISTORY  2022    Foot surgery    OTHER SURGICAL HISTORY  2022    Dilation and curettage     Family History   Problem Relation Name Age of Onset    Depression Mother      Blood clot Mother      Other (heart condition) Father      Heart disease Father      Asthma Sister      Sleep apnea Mother's Sister      Spina bifida Mother's Brother      Diabetes Maternal Grandmother      Gout Maternal Grandmother      Diabetes Maternal Grandfather      Kidney failure Maternal Grandfather      Spina bifida Other Cousin      Social History     Tobacco Use    Smoking status: Never    Smokeless tobacco: Never   Vaping Use    Vaping status: Never Used   Substance Use Topics    Alcohol use: Not Currently    Drug use: Never       Physical Exam   Visit Vitals  /74   Pulse 72   Temp 36.4 °C (97.5 °F)   Resp 17   SpO2 98%   OB Status Recent pregnancy   Smoking Status Never      Physical Exam    XR chest 2 views   Final Result   NO ACUTE DISEASE IN THE CHEST        MACRO:   None        Signed by: Giovani Loaiza 2024 2:22 PM   Dictation workstation:   [id number]      XR shoulder right 2+ views   Final Result  reconstruction, and/or weight based dose when appropriate to reduced radiation dose to as low as reasonably achievable. FINDINGS: No acute intra- or extra-axial hemorrhage or fluid collections are identified. No mass effect or midline shift is seen. The ventricles are of normal size, shape, and morphology. The basilar cisterns are patent. The gray-white matter differentiation is normal. Fluid noted in the left maxillary sinus with mild mucosal thickening in the paranasal sinuses, compatible with sinusitis. The visualized portions of the orbits and mastoids appear normal. No acute fracture is identified.     1.   No evidence of acute intracranial hemorrhage, midline shift, or significant mass effect. END IMPRESSION: Electronically Signed by: CHRISTINA MCCRAY M.D. Signed on: 6/24/2024 3:01 PM Workstation ID: [id number]    XR CHEST PA OR AP 1 VIEW    Result Date: 6/24/2024  EXAM: XR CHEST AP OR PA CLINICAL INDICATION: Palpitations. COMPARISON: Chest x-ray April 1, 2024.     FINDINGS/IMPRESSION: There is a new 0.8 cm nodular density projecting over right lung base most likely representing nipple shadow; I feel the possibility of pulmonary nodule is less likely though not entirely excluded. If warranted, repeat chest x-ray with nipple markers is recommended to characterize this finding further. Lungs are otherwise clear. Heart size, pulmonary vasculature and mediastinal contour remain stable. No evidence of significant pleural effusions or pneumothorax. Chronic healed fracture deformity posterior lateral aspect of the left eighth rib. Evidence of prior sternotomy. Overlying EKG leads. Electronically Signed by: JANA STAHL M.D. Signed on: 6/24/2024 12:48 PM Workstation ID: [id number]      Assessment/Plan:  46M with paroxysmal a-fib on Xarelto, EtOH use d/o, MVP s/p repair a/w palpitations. Vomiting last night after eating steak. Pt had a seizure in the ED and got 2mg ativan. Pt also noted to be in a-flutter with low    No acute fracture or active dislocation             MACRO:   None        Signed by: Giovani Loaiza 12/29/2024 2:21 PM   Dictation workstation:   [id number]      XR knee right 4+ views   Final Result   No acute fracture or dislocation             MACRO:   None        Signed by: Giovani Loaiza 12/29/2024 2:22 PM   Dictation workstation:   [id number]      CT head W O contrast trauma protocol   Final Result   NO ACUTE INTRACRANIAL PROCESS. SKULL INTACT        NO ACUTE FRACTURE OR SUBLUXATION IN THE CERVICAL SPINE        THIS REPORT SERVES AS THE DIAGNOSTIC INTERPRETATION FOR TWO EXAMS   PERFORMED CONCURRENTLY: CT BRAIN WITHOUT IV CONTRAST AND CT CERVICAL   SPINE WITHOUT IV CONTRAST        MACRO:   None        Signed by: Giovani Loaiza 12/29/2024 2:20 PM   Dictation workstation:   [id number]      CT cervical spine wo IV contrast   Final Result   NO ACUTE INTRACRANIAL PROCESS. SKULL INTACT        NO ACUTE FRACTURE OR SUBLUXATION IN THE CERVICAL SPINE        THIS REPORT SERVES AS THE DIAGNOSTIC INTERPRETATION FOR TWO EXAMS   PERFORMED CONCURRENTLY: CT BRAIN WITHOUT IV CONTRAST AND CT CERVICAL   SPINE WITHOUT IV CONTRAST        MACRO:   None        Signed by: Giovani Loaiza 12/29/2024 2:20 PM   Dictation workstation:   [id number]          Labs Reviewed   CBC WITH AUTO DIFFERENTIAL - Abnormal       Result Value    WBC 7.4      nRBC 0.0      RBC 3.60 (*)     Hemoglobin 10.9 (*)     Hematocrit 32.0 (*)     MCV 89      MCH 30.3      MCHC 34.1      RDW 13.2      Platelets 281      Neutrophils % 59.3      Immature Granulocytes %, Automated 0.3      Lymphocytes % 33.6      Monocytes % 4.5      Eosinophils % 1.6      Basophils % 0.7      Neutrophils Absolute 4.39      Immature Granulocytes Absolute, Automated 0.02      Lymphocytes Absolute 2.49      Monocytes Absolute 0.33      Eosinophils Absolute 0.12      Basophils Absolute 0.05     COMPREHENSIVE METABOLIC PANEL - Normal    Glucose 89      Sodium 139      Potassium 4.2       MAP, so he got IV amiodarone.   GI consulted for elevated LFT's. Bili 4.7, , ALT 72, . In April, similar transaminases but bili only 2.7.      Pt is somewhat drowsy and not answering all questions, as he got ativan for seizures. Pt notes vomiting after eating last night and having palpitations this morning. Drinks a 6-pack every other day. Has tattoos, but denies IVDU, supplements, blood transfusions, hepatitis contacts, FH/personal history of liver dz.      # Palpitations/A-fib/flutter, started on amio gtt  # Seizures s/p ativan  # Abnormal LFT's  # EtOH abuse  # Thrombocytopenia  # Elevated INR  # Hypoxic resp fx s/p intubation   Bili slightly increased, transaminases decreased.    Negative: hepatitis panel, ceruloplasmin, JOCELYN, ASMA, AMA  Positive: IgG borderline elevated to 1620 (other Ig's normal), Fe 9, TIBC 302, saturation 3%, ferritin 534  Pending: A1AT  Ultrasound: fatty liver.     Continue supportive care.     Ramiro Chew MD  6/27/2024     Chloride 107      Bicarbonate 26      Anion Gap 10      Urea Nitrogen 12      Creatinine 0.64      eGFR >90      Calcium 9.4      Albumin 3.9      Alkaline Phosphatase 84      Total Protein 6.7      AST 16      Bilirubin, Total 0.4      ALT 8     ALCOHOL - Normal    Alcohol <10     LACTATE - Normal    Lactate 0.5      Narrative:     Venipuncture immediately after or during the administration of Metamizole may lead to falsely low results. Testing should be performed immediately prior to Metamizole dosing.   PROTIME-INR - Normal    Protime 12.7      INR 1.1     TYPE AND SCREEN    ABO TYPE B      Rh TYPE POS      ANTIBODY SCREEN NEG           ED Course & MDM   Diagnoses as of 01/03/25 0843   Head injury, initial encounter   Fall, initial encounter   Musculoskeletal pain           Medical Decision Making         Your medication list        ASK your doctor about these medications        Instructions Last Dose Given Next Dose Due   cephalexin 500 mg capsule  Commonly known as: Keflex      Take 1 capsule (500 mg) by mouth 2 times a day for 5 days.       dicyclomine 10 mg capsule  Commonly known as: Bentyl      Take 1 capsule (10 mg) by mouth 4 times a day.       HYDROcodone-acetaminophen 5-325 mg tablet  Commonly known as: Norco  Ask about: Should I take this medication?      Take 1 tablet by mouth every 6 hours if needed for severe pain (7 - 10) for up to 2 days.       ondansetron ODT 4 mg disintegrating tablet  Commonly known as: Zofran-ODT      Dissolve 1 tablet (4 mg) in the mouth every 8 hours if needed for nausea or vomiting for up to 7 days.       pantoprazole 40 mg EC tablet  Commonly known as: Protonix      Take 1 tablet (40 mg) by mouth once daily in the morning. Take before meals. Do not crush, chew, or split.                Procedure  Critical Care    Performed by: Keenan Neff MD  Authorized by: Keenan Neff MD    Critical care provider statement:     Critical care time (minutes):  32     Critical care time was exclusive of:  Separately billable procedures and treating other patients    Critical care was necessary to treat or prevent imminent or life-threatening deterioration of the following conditions:  Trauma    Critical care was time spent personally by me on the following activities:  Development of treatment plan with patient or surrogate, discussions with consultants, examination of patient, obtaining history from patient or surrogate, ordering and performing treatments and interventions, ordering and review of laboratory studies and ordering and review of radiographic studies    Care discussed with comment:  Trauma surgeon Dr. Najera       *This report was transcribed using voice recognition software.  Every effort was made to ensure accuracy; however, inadvertent computerized transcription errors may be present.*  Keenan Neff MD  01/03/25         Keenan Neff MD  01/03/25 0839

## 2024-12-30 LAB — BACTERIA UR CULT: NORMAL

## 2024-12-31 ENCOUNTER — TELEPHONE (OUTPATIENT)
Dept: PHARMACY | Facility: HOSPITAL | Age: 24
End: 2024-12-31
Payer: COMMERCIAL

## 2024-12-31 NOTE — PROGRESS NOTES
EDPD Note: Rapid Result Review    I reviewed Babs Brown 's chart regarding a positive urine culture/result that was taken during their recent emergency room visit. The patient was not told about these results prior to leaving the emergency department. Therefore, patient was contacted and given appropriate education.     Pt presented in ED on 12/27 with right left abdominal pain that is sharp in nature, non radiating and varies in intensity.  Also reports some nausea associated with it as well. Denies of urinary symptoms. Pt was not discharged with any antibiotic. Pt came back to ED on 12/28 for same complaint, and was given Keflex 500 mg BID x 5 days. At time of call, patient denies urinary symptoms. Urine culture from 12/28 resulted negative. Patient reports she just got her period, and thinks the abdominal pain was pre-period cramping. Counseled patient to discontinue antibiotic due to lack of urinary symptoms and most recent urine culture negative    Urine Culture Clinically insignificant growth based on current clinical standards.        Resulting Agency: [facility]          Specimen Collected: 12/28/24 17:32 Last Resulted: 12/30/24 08:33     Susceptibility data from last 90 days.  Collected Specimen Info Organism Amoxicillin/Clavulanate Ampicillin Ampicillin/Sulbactam Cefazolin Cefazolin (uncomplicated UTIs only) Ciprofloxacin Gentamicin Nitrofurantoin Piperacillin/Tazobactam Trimethoprim/Sulfamethoxazole   12/27/24 Urine from Clean Catch/Voided Escherichia coli  S  R  R  S  S  S  S  S  S  R     Admission on 12/29/2024, Discharged on 12/29/2024   Component Date Value Ref Range Status    WBC 12/29/2024 7.4  4.4 - 11.3 x10*3/uL Final    nRBC 12/29/2024 0.0  0.0 - 0.0 /100 WBCs Final    RBC 12/29/2024 3.60 (L)  4.00 - 5.20 x10*6/uL Final    Hemoglobin 12/29/2024 10.9 (L)  12.0 - 16.0 g/dL Final    Hematocrit 12/29/2024 32.0 (L)  36.0 - 46.0 % Final    MCV 12/29/2024 89  80 - 100 fL Final    MCH 12/29/2024 30.3   26.0 - 34.0 pg Final    MCHC 12/29/2024 34.1  32.0 - 36.0 g/dL Final    RDW 12/29/2024 13.2  11.5 - 14.5 % Final    Platelets 12/29/2024 281  150 - 450 x10*3/uL Final    Neutrophils % 12/29/2024 59.3  40.0 - 80.0 % Final    Immature Granulocytes %, Automated 12/29/2024 0.3  0.0 - 0.9 % Final    Immature Granulocyte Count (IG) includes promyelocytes, myelocytes and metamyelocytes but does not include bands. Percent differential counts (%) should be interpreted in the context of the absolute cell counts (cells/UL).    Lymphocytes % 12/29/2024 33.6  13.0 - 44.0 % Final    Monocytes % 12/29/2024 4.5  2.0 - 10.0 % Final    Eosinophils % 12/29/2024 1.6  0.0 - 6.0 % Final    Basophils % 12/29/2024 0.7  0.0 - 2.0 % Final    Neutrophils Absolute 12/29/2024 4.39  1.20 - 7.70 x10*3/uL Final    Percent differential counts (%) should be interpreted in the context of the absolute cell counts (cells/uL).    Immature Granulocytes Absolute, Au* 12/29/2024 0.02  0.00 - 0.70 x10*3/uL Final    Lymphocytes Absolute 12/29/2024 2.49  1.20 - 4.80 x10*3/uL Final    Monocytes Absolute 12/29/2024 0.33  0.10 - 1.00 x10*3/uL Final    Eosinophils Absolute 12/29/2024 0.12  0.00 - 0.70 x10*3/uL Final    Basophils Absolute 12/29/2024 0.05  0.00 - 0.10 x10*3/uL Final    Glucose 12/29/2024 89  74 - 99 mg/dL Final    Sodium 12/29/2024 139  136 - 145 mmol/L Final    Potassium 12/29/2024 4.2  3.5 - 5.3 mmol/L Final    MILD HEMOLYSIS DETECTED. The result may be falsely elevated due to hemolysis or other interferents. Clinical correlation is recommended. Repeat testing may be considered.    Chloride 12/29/2024 107  98 - 107 mmol/L Final    Bicarbonate 12/29/2024 26  21 - 32 mmol/L Final    Anion Gap 12/29/2024 10  10 - 20 mmol/L Final    Urea Nitrogen 12/29/2024 12  6 - 23 mg/dL Final    Creatinine 12/29/2024 0.64  0.50 - 1.05 mg/dL Final    eGFR 12/29/2024 >90  >60 mL/min/1.73m*2 Final    Calculations of estimated GFR are performed using the 2021 CKD-EPI  Study Refit equation without the race variable for the IDMS-Traceable creatinine methods.  https://jasn.asnjournals.org/content/early/2021/09/22/ASN.4300966158    Calcium 12/29/2024 9.4  8.6 - 10.3 mg/dL Final    Albumin 12/29/2024 3.9  3.4 - 5.0 g/dL Final    Alkaline Phosphatase 12/29/2024 84  33 - 110 U/L Final    Total Protein 12/29/2024 6.7  6.4 - 8.2 g/dL Final    AST 12/29/2024 16  9 - 39 U/L Final    MILD HEMOLYSIS DETECTED. The result may be falsely elevated due to hemolysis or other interferents. Clinical correlation is recommended. Repeat testing may be considered.    Bilirubin, Total 12/29/2024 0.4  0.0 - 1.2 mg/dL Final    ALT 12/29/2024 8  7 - 45 U/L Final    Patients treated with Sulfasalazine may generate falsely decreased results for ALT.    Alcohol 12/29/2024 <10  <=10 mg/dL Final    For medical use only.    Lactate 12/29/2024 0.5  0.4 - 2.0 mmol/L Final    Protime 12/29/2024 12.7  9.8 - 12.8 seconds Final    INR 12/29/2024 1.1  0.9 - 1.1 Final    ABO TYPE 12/29/2024 B   Final    Rh TYPE 12/29/2024 POS   Final    ANTIBODY SCREEN 12/29/2024 NEG   Final   Admission on 12/28/2024, Discharged on 12/28/2024   Component Date Value Ref Range Status    WBC 12/28/2024 6.6  4.4 - 11.3 x10*3/uL Final    nRBC 12/28/2024 0.0  0.0 - 0.0 /100 WBCs Final    RBC 12/28/2024 3.54 (L)  4.00 - 5.20 x10*6/uL Final    Hemoglobin 12/28/2024 10.6 (L)  12.0 - 16.0 g/dL Final    Hematocrit 12/28/2024 32.1 (L)  36.0 - 46.0 % Final    MCV 12/28/2024 91  80 - 100 fL Final    MCH 12/28/2024 29.9  26.0 - 34.0 pg Final    MCHC 12/28/2024 33.0  32.0 - 36.0 g/dL Final    RDW 12/28/2024 13.6  11.5 - 14.5 % Final    Platelets 12/28/2024 264  150 - 450 x10*3/uL Final    Neutrophils % 12/28/2024 55.1  40.0 - 80.0 % Final    Immature Granulocytes %, Automated 12/28/2024 0.3  0.0 - 0.9 % Final    Immature Granulocyte Count (IG) includes promyelocytes, myelocytes and metamyelocytes but does not include bands. Percent differential counts  (%) should be interpreted in the context of the absolute cell counts (cells/UL).    Lymphocytes % 12/28/2024 35.7  13.0 - 44.0 % Final    Monocytes % 12/28/2024 5.4  2.0 - 10.0 % Final    Eosinophils % 12/28/2024 2.9  0.0 - 6.0 % Final    Basophils % 12/28/2024 0.6  0.0 - 2.0 % Final    Neutrophils Absolute 12/28/2024 3.64  1.20 - 7.70 x10*3/uL Final    Percent differential counts (%) should be interpreted in the context of the absolute cell counts (cells/uL).    Immature Granulocytes Absolute, Au* 12/28/2024 0.02  0.00 - 0.70 x10*3/uL Final    Lymphocytes Absolute 12/28/2024 2.36  1.20 - 4.80 x10*3/uL Final    Monocytes Absolute 12/28/2024 0.36  0.10 - 1.00 x10*3/uL Final    Eosinophils Absolute 12/28/2024 0.19  0.00 - 0.70 x10*3/uL Final    Basophils Absolute 12/28/2024 0.04  0.00 - 0.10 x10*3/uL Final    Glucose 12/28/2024 90  74 - 99 mg/dL Final    Sodium 12/28/2024 140  136 - 145 mmol/L Final    Potassium 12/28/2024 3.4 (L)  3.5 - 5.3 mmol/L Final    Chloride 12/28/2024 105  98 - 107 mmol/L Final    Bicarbonate 12/28/2024 28  21 - 32 mmol/L Final    Anion Gap 12/28/2024 10  10 - 20 mmol/L Final    Urea Nitrogen 12/28/2024 18  6 - 23 mg/dL Final    Creatinine 12/28/2024 0.69  0.50 - 1.05 mg/dL Final    eGFR 12/28/2024 >90  >60 mL/min/1.73m*2 Final    Calculations of estimated GFR are performed using the 2021 CKD-EPI Study Refit equation without the race variable for the IDMS-Traceable creatinine methods.  https://jasn.asnjournals.org/content/early/2021/09/22/ASN.9228055266    Calcium 12/28/2024 8.8  8.6 - 10.3 mg/dL Final    Albumin 12/28/2024 3.7  3.4 - 5.0 g/dL Final    Alkaline Phosphatase 12/28/2024 73  33 - 110 U/L Final    Total Protein 12/28/2024 6.1 (L)  6.4 - 8.2 g/dL Final    AST 12/28/2024 9  9 - 39 U/L Final    Bilirubin, Total 12/28/2024 0.2  0.0 - 1.2 mg/dL Final    ALT 12/28/2024 8  7 - 45 U/L Final    Patients treated with Sulfasalazine may generate falsely decreased results for ALT.    HCG,  Urine 12/28/2024 NEGATIVE  NEGATIVE Final    Color, Urine 12/28/2024 Yellow  Light-Yellow, Yellow, Dark-Yellow Final    Appearance, Urine 12/28/2024 Turbid (N)  Clear Final    Specific Gravity, Urine 12/28/2024 1.040 (N)  1.005 - 1.035 Final    pH, Urine 12/28/2024 6.0  5.0, 5.5, 6.0, 6.5, 7.0, 7.5, 8.0 Final    Protein, Urine 12/28/2024 20 (TRACE)  NEGATIVE, 10 (TRACE), 20 (TRACE) mg/dL Final    Glucose, Urine 12/28/2024 Normal  Normal mg/dL Final    Blood, Urine 12/28/2024 0.06 (1+) (A)  NEGATIVE Final    Ketones, Urine 12/28/2024 NEGATIVE  NEGATIVE mg/dL Final    Bilirubin, Urine 12/28/2024 NEGATIVE  NEGATIVE Final    Urobilinogen, Urine 12/28/2024 Normal  Normal mg/dL Final    Nitrite, Urine 12/28/2024 NEGATIVE  NEGATIVE Final    Leukocyte Esterase, Urine 12/28/2024 250 Altagracia/µL (A)  NEGATIVE Final    Extra Tube 12/28/2024 Hold for add-ons.   Final    Auto resulted.    WBC, Urine 12/28/2024 6-10 (A)  1-5, NONE /HPF Final    RBC, Urine 12/28/2024 1-2  NONE, 1-2, 3-5 /HPF Final    Squamous Epithelial Cells, Urine 12/28/2024 51-75 (2+)  Reference range not established. /HPF Final    Bacteria, Urine 12/28/2024 1+ (A)  NONE SEEN /HPF Final    Mucus, Urine 12/28/2024 2+  Reference range not established. /LPF Final    Urine Culture 12/28/2024 Clinically insignificant growth based on current clinical standards.   Final   Admission on 12/27/2024, Discharged on 12/27/2024   Component Date Value Ref Range Status    WBC 12/27/2024 7.4  4.4 - 11.3 x10*3/uL Final    nRBC 12/27/2024 0.0  0.0 - 0.0 /100 WBCs Final    RBC 12/27/2024 3.62 (L)  4.00 - 5.20 x10*6/uL Final    Hemoglobin 12/27/2024 11.0 (L)  12.0 - 16.0 g/dL Final    Hematocrit 12/27/2024 32.6 (L)  36.0 - 46.0 % Final    MCV 12/27/2024 90  80 - 100 fL Final    MCH 12/27/2024 30.4  26.0 - 34.0 pg Final    MCHC 12/27/2024 33.7  32.0 - 36.0 g/dL Final    RDW 12/27/2024 13.7  11.5 - 14.5 % Final    Platelets 12/27/2024 278  150 - 450 x10*3/uL Final    Neutrophils %  12/27/2024 56.2  40.0 - 80.0 % Final    Immature Granulocytes %, Automated 12/27/2024 0.3  0.0 - 0.9 % Final    Immature Granulocyte Count (IG) includes promyelocytes, myelocytes and metamyelocytes but does not include bands. Percent differential counts (%) should be interpreted in the context of the absolute cell counts (cells/UL).    Lymphocytes % 12/27/2024 36.0  13.0 - 44.0 % Final    Monocytes % 12/27/2024 5.2  2.0 - 10.0 % Final    Eosinophils % 12/27/2024 1.6  0.0 - 6.0 % Final    Basophils % 12/27/2024 0.7  0.0 - 2.0 % Final    Neutrophils Absolute 12/27/2024 4.15  1.20 - 7.70 x10*3/uL Final    Percent differential counts (%) should be interpreted in the context of the absolute cell counts (cells/uL).    Immature Granulocytes Absolute, Au* 12/27/2024 0.02  0.00 - 0.70 x10*3/uL Final    Lymphocytes Absolute 12/27/2024 2.65  1.20 - 4.80 x10*3/uL Final    Monocytes Absolute 12/27/2024 0.38  0.10 - 1.00 x10*3/uL Final    Eosinophils Absolute 12/27/2024 0.12  0.00 - 0.70 x10*3/uL Final    Basophils Absolute 12/27/2024 0.05  0.00 - 0.10 x10*3/uL Final    Glucose 12/27/2024 88  74 - 99 mg/dL Final    Sodium 12/27/2024 143  136 - 145 mmol/L Final    Potassium 12/27/2024 3.6  3.5 - 5.3 mmol/L Final    Chloride 12/27/2024 107  98 - 107 mmol/L Final    Bicarbonate 12/27/2024 27  21 - 32 mmol/L Final    Anion Gap 12/27/2024 13  10 - 20 mmol/L Final    Urea Nitrogen 12/27/2024 16  6 - 23 mg/dL Final    Creatinine 12/27/2024 0.73  0.50 - 1.05 mg/dL Final    eGFR 12/27/2024 >90  >60 mL/min/1.73m*2 Final    Calculations of estimated GFR are performed using the 2021 CKD-EPI Study Refit equation without the race variable for the IDMS-Traceable creatinine methods.  https://jasn.asnjournals.org/content/early/2021/09/22/ASN.8940639649    Calcium 12/27/2024 8.7  8.6 - 10.3 mg/dL Final    Albumin 12/27/2024 3.8  3.4 - 5.0 g/dL Final    Alkaline Phosphatase 12/27/2024 75  33 - 110 U/L Final    Total Protein 12/27/2024 6.2 (L)  6.4  - 8.2 g/dL Final    AST 12/27/2024 9  9 - 39 U/L Final    Bilirubin, Total 12/27/2024 0.3  0.0 - 1.2 mg/dL Final    ALT 12/27/2024 7  7 - 45 U/L Final    Patients treated with Sulfasalazine may generate falsely decreased results for ALT.    HCG, Urine 12/27/2024 NEGATIVE  NEGATIVE Final    Color, Urine 12/27/2024 Yellow  Light-Yellow, Yellow, Dark-Yellow Final    Appearance, Urine 12/27/2024 Turbid (N)  Clear Final    Specific Gravity, Urine 12/27/2024 1.034  1.005 - 1.035 Final    pH, Urine 12/27/2024 6.5  5.0, 5.5, 6.0, 6.5, 7.0, 7.5, 8.0 Final    Protein, Urine 12/27/2024 30 (1+) (A)  NEGATIVE, 10 (TRACE), 20 (TRACE) mg/dL Final    Glucose, Urine 12/27/2024 Normal  Normal mg/dL Final    Blood, Urine 12/27/2024 NEGATIVE  NEGATIVE Final    Ketones, Urine 12/27/2024 NEGATIVE  NEGATIVE mg/dL Final    Bilirubin, Urine 12/27/2024 NEGATIVE  NEGATIVE Final    Urobilinogen, Urine 12/27/2024 Normal  Normal mg/dL Final    Nitrite, Urine 12/27/2024 NEGATIVE  NEGATIVE Final    Leukocyte Esterase, Urine 12/27/2024 75 Altagracia/µL (A)  NEGATIVE Final    Extra Tube 12/27/2024 Hold for add-ons.   Final    Auto resulted.    WBC, Urine 12/27/2024 11-20 (A)  1-5, NONE /HPF Final    RBC, Urine 12/27/2024 1-2  NONE, 1-2, 3-5 /HPF Final    Squamous Epithelial Cells, Urine 12/27/2024 51-75 (2+)  Reference range not established. /HPF Final    Bacteria, Urine 12/27/2024 2+ (A)  NONE SEEN /HPF Final    Mucus, Urine 12/27/2024 4+  Reference range not established. /LPF Final    Urine Culture 12/27/2024 20,000 - 80,000 CFU/mL Escherichia coli (A)   Final       No further follow up needed from EDPD Team.     If there are any other questions for the ED Post-Discharge Culture Follow Up Team, please contact [phone]. Fax: [phone].    Diana Mojica, PharmD

## 2025-01-06 ENCOUNTER — APPOINTMENT (OUTPATIENT)
Dept: OBSTETRICS AND GYNECOLOGY | Facility: CLINIC | Age: 25
End: 2025-01-06
Payer: COMMERCIAL

## 2025-02-28 ENCOUNTER — TELEPHONE (OUTPATIENT)
Dept: OBSTETRICS AND GYNECOLOGY | Facility: CLINIC | Age: 25
End: 2025-02-28
Payer: COMMERCIAL

## 2025-02-28 NOTE — TELEPHONE ENCOUNTER
Pt has IUD, placement last confirmed with US in December 2024. Pt reports she has been having constant left sided pelvic pain for a couple of days, 8/10 pain, worst when she tries to lift anything heavy. Denies bleeding/discharge/fever. Advised pt that she can go to ED for evaluation, pt reports she is unable to go to ER for eval and requests an appointment next week. Pt does not sound to be in any acute distress from pain during phone call. Advised to take upt to rule out ectopic pregnancy and go to ER immediately if positive, otherwise can continue to monitor symptoms and if pain becomes more severe, if she develops any bleeding or abnormal discharge, or develops a fever, she should present to ER immediately. Offered pt appointment on Tuesday but she prefers visit Mon/Wed/Fri. Scheduled with Zenaida PINA on Friday but advised to call Monday morning to see if Minerva has any cancellations that day. Pt agreeable with plan

## 2025-03-07 ENCOUNTER — APPOINTMENT (OUTPATIENT)
Dept: OBSTETRICS AND GYNECOLOGY | Facility: CLINIC | Age: 25
End: 2025-03-07
Payer: COMMERCIAL

## 2025-03-07 VITALS — DIASTOLIC BLOOD PRESSURE: 70 MMHG | SYSTOLIC BLOOD PRESSURE: 102 MMHG

## 2025-03-07 DIAGNOSIS — T83.32XA INTRAUTERINE CONTRACEPTIVE DEVICE THREADS LOST, INITIAL ENCOUNTER: Primary | ICD-10-CM

## 2025-03-07 DIAGNOSIS — N89.8 VAGINAL DISCHARGE: ICD-10-CM

## 2025-03-07 DIAGNOSIS — Z32.02 URINE PREGNANCY TEST NEGATIVE: ICD-10-CM

## 2025-03-07 LAB — PREGNANCY TEST URINE, POC: NEGATIVE

## 2025-03-07 PROCEDURE — 81025 URINE PREGNANCY TEST: CPT | Performed by: NURSE PRACTITIONER

## 2025-03-07 PROCEDURE — 99213 OFFICE O/P EST LOW 20 MIN: CPT | Performed by: NURSE PRACTITIONER

## 2025-03-07 RX ORDER — COPPER 313.4 MG/1
1 INTRAUTERINE DEVICE INTRAUTERINE ONCE
COMMUNITY

## 2025-03-07 RX ORDER — VITAMIN A, VITAMIN C, VITAMIN D, VITAMIN E, THIAMINE, RIBOFLAVIN, NIACIN, VITAMIN B6, FOLIC ACID, VITAMIN B12, CALCIUM, IRON, ZINC, COPPER 4000; 120; 400; 22; 1.84; 3; 20; 10; 1; 12; 200; 27; 25; 2 [IU]/1; MG/1; [IU]/1; [IU]/1; MG/1; MG/1; MG/1; MG/1; MG/1; UG/1; MG/1; MG/1; MG/1; MG/1
1 TABLET ORAL
COMMUNITY
Start: 2024-03-15

## 2025-03-07 NOTE — PROGRESS NOTES
Subjective   Patient ID: Babs Brown is a 24 y.o. female who presents for Pelvic Pain (Reviewing  EMR/Last Office Visit- ParaGard insertion with Luisa MANNING CNM 09/23/2024/).  HPI  C/o pain in the pelvis. Started a few weeks ago, has worsened in the last week. Feels pain worse when she picks up her kids. C/o vaginal pain. Scant discharge. Denies any itching, odor, no dysuria. Declines STD testing.   IUD (Paragard) placed Sept 23, 2024. Has regular periods that are monthly, lasting 4-5 days.     Review of Systems   All other systems reviewed and are negative.      Objective   Physical Exam  Constitutional:       Appearance: Normal appearance.   Pulmonary:      Effort: Pulmonary effort is normal.   Genitourinary:     General: Normal vulva.      Vagina: Normal.      Cervix: Normal.      Uterus: Normal.       Adnexa: Right adnexa normal and left adnexa normal.      Comments: No pain or tenderness on bimanual exam. Strings not visualized.   Skin:     General: Skin is warm and dry.   Neurological:      Mental Status: She is alert.   Psychiatric:         Mood and Affect: Mood normal.         Behavior: Behavior normal.         Assessment/Plan   Diagnoses and all orders for this visit:  Intrauterine contraceptive device threads lost, initial encounter  -     US PELVIS TRANSABDOMINAL WITH TRANSVAGINAL; Future  For pelvic pain- she will continue use of Aleve as needed for pain. Urine HCG is negative today. Will conform placement of IUD with ultrasound.   Vaginal discharge  -     Vaginitis Gram Stain For Bacterial Vaginosis + Yeast  Urine pregnancy test negative  -     POCT pregnancy, urine manually resulted  Follow-up pending results of ultrasound for next steps in care.        KUMAR Murcia-CNP 03/07/25 11:29 AM

## 2025-03-09 LAB — BV SCORE VAG QL: NORMAL

## 2025-03-11 ENCOUNTER — APPOINTMENT (OUTPATIENT)
Dept: RADIOLOGY | Facility: HOSPITAL | Age: 25
End: 2025-03-11
Payer: COMMERCIAL

## 2025-03-12 ENCOUNTER — TELEPHONE (OUTPATIENT)
Dept: OBSTETRICS AND GYNECOLOGY | Facility: CLINIC | Age: 25
End: 2025-03-12
Payer: COMMERCIAL

## 2025-03-12 ENCOUNTER — HOSPITAL ENCOUNTER (OUTPATIENT)
Dept: RADIOLOGY | Facility: HOSPITAL | Age: 25
Discharge: HOME | End: 2025-03-12
Payer: COMMERCIAL

## 2025-03-12 DIAGNOSIS — T83.32XA INTRAUTERINE CONTRACEPTIVE DEVICE THREADS LOST, INITIAL ENCOUNTER: ICD-10-CM

## 2025-03-12 PROCEDURE — 76830 TRANSVAGINAL US NON-OB: CPT

## 2025-03-12 NOTE — TELEPHONE ENCOUNTER
"Pt saw Zenaida Butchersamanthamaxine Maria G APRN-CNP Friday for pelvic pain with IUD, plan for pelvic US at 11:15am today. Pt called to report pain has worsened since appointment on Friday, especially on Monday after she picked up her 30lb child. Had been experiencing a stabbing pain when she saw Zenaida Friday but now notices a \"tightening\" pain as well on her left side since Monday. Has ongoing bloating still. Reports she is taking ibuprofen 800mg and tylenol 1000mg s1avgob and it is not helping very much and ibuprofen is causing some gastric upset. Pt does not sound to be in acute distress on the phone. Wondering what she can do about the pain. Reviewed with Zenaida PINA. She advises pt to attend ultrasound appointment as scheduled, continue to take OTC meds (eat meal with meds to decrease gastric upset, Tums PRN). Present to ED if pain becomes intolerable while waiting for US results. Pt agreeable with plan. Pt called back into office around 3 pm after ultrasound was performed, with c/o of increased pain and wondering if there is anything else she can try for the pain. Advised pt to keep taking the ibuprofen and tylenol, but she can also try a lidocaine 4% patch over her pubic area as well. Advised pt again that if the pain persists and she is unable to tolerate it she will need to be seen in ER for evaluation, until ultrasound results are back and we can figure out a plan for her. Pt agreeable with plan.  "

## 2025-03-13 NOTE — TELEPHONE ENCOUNTER
Zenaida PINA notified pt of US results. Scheduled with Minerva on Monday for FUV and possible IUD removal. Pt again called in to ask if something else can be done about pain besides OTC meds. Reviewed with Zenaida PINA. Reviewed with pt that advice still stands to go to ED if pain is intolerable. Pt states understanding

## 2025-03-17 ENCOUNTER — APPOINTMENT (OUTPATIENT)
Dept: OBSTETRICS AND GYNECOLOGY | Facility: CLINIC | Age: 25
End: 2025-03-17
Payer: COMMERCIAL

## 2025-03-24 NOTE — PROGRESS NOTES
Subjective   Babs Brown is a 25 y.o.  who presents for IUD check. S/P Ultraousnd with reassuring placement of device  PT is unsure if she desires for this to be removed. Desires other options for contraception due to pain.     Type of IUD:  ParaGard  Date of insertion:  known  Other relevant history/information:   ongoing pelvic pain   Mild irregular spotting that has resolved.   Denies new exposure to STIs and agreeable STI testing at today's visit.      Objective   /80   Wt 88.7 kg (195 lb 9.6 oz)   LMP 2025 (Exact Date)   Breastfeeding Yes   BMI 33.57 kg/m²     Constitutional; alert with no acute distress.  Well-nourished.      Abdomen: Soft, nontender, no abdominal masses palpated    Genitourinary:  Palpation of the lymph nodes of the groin-no inguinal lymphadenopathy  External genitalia/perianal: Without lesions normal in appearance   Urethra: In appearance without lesions Bladder: non-tender to palpate  Vagina: Without lesions including Bartholin, urethra, Corning's glands within normal limits all WNL   Cervix: Normal in appearance without lesions, no cervical motion tenderness to palpation, IUD STRINGS VISUALIZED 2  cm from opening of cervix   Uterus: deferred  Bilateral adnexa:  deferred      Skin: Normal skin color and pigmentation     Babs was seen today for follow up gyn and procedure.  Diagnoses and all orders for this visit:  Encounter for IUD removal (Primary)  -     prenatal vitamin, iron-folic, 27 mg iron-800 mcg folic acid tablet; Take 1 tablet by mouth once daily.  -     IUD Removal  Vaginal discharge  -     C. trachomatis / N. gonorrhoeae, Amplified, Urogenital; Future  -     Trichomonas vaginalis, Amplified  -     Cancel: Vaginitis Gram Stain For Bacterial Vaginosis + Yeast; Future  -     Vaginitis Gram Stain For Bacterial Vaginosis + Yeast  H/O  section  Encounter for initial prescription of contraceptive pills  -     desogestrel-ethinyl estradiol  (Enskyce) 0.15-0.03 mg tablet; Take 1 tablet by mouth once daily.     IUD Removal    Performed by: NELL Waters APRN-CNP  Authorized by: NELL Waters APRN-CNP    Procedure: IUD removal    Consent obtained by patient, parent, or legal power of  - including discussion of procedure risks and benefits, patient questions answered, and patient education provided: yes    Reason for removal: patient request and mechanical complications    Strings visualized: yes    Cervix cleaned with: iodopovidone    Tenaculum applied to cervix: no    IUD grasped by forceps: yes    Performed with ultrasound guidance: no    IUD removed: yes    Date/Time of Removal:  3/25/2025 8:39 AM  Removed without complications: yes    IUD intact: yes    Cervix manually dilated: no         Plan:  IUD removed intact without complications.     Pt advised or r/b of contraceptions.   Discussed concerns specific to increased r/b blood clots, heart attacks and strokes.   Advised to report sx of concern including ACHES.  Transient concerns of irregular menses, spotting, breast tenderness and mild headaches reviewed.   Pt verbalized understanding of the risks.      RTO 8/2025 for annual and pap due discussed.  Use condoms x 1month since IUD removal and start of OCPs.   Pt with ongoing birth trauma, discussed counseling and recommended this for Autumn.     NELL Waters, YOVANI

## 2025-03-25 ENCOUNTER — APPOINTMENT (OUTPATIENT)
Dept: OBSTETRICS AND GYNECOLOGY | Facility: CLINIC | Age: 25
End: 2025-03-25
Payer: COMMERCIAL

## 2025-03-25 VITALS — BODY MASS INDEX: 33.57 KG/M2 | DIASTOLIC BLOOD PRESSURE: 80 MMHG | WEIGHT: 195.6 LBS | SYSTOLIC BLOOD PRESSURE: 110 MMHG

## 2025-03-25 DIAGNOSIS — Z30.432 ENCOUNTER FOR IUD REMOVAL: Primary | ICD-10-CM

## 2025-03-25 DIAGNOSIS — Z30.011 ENCOUNTER FOR INITIAL PRESCRIPTION OF CONTRACEPTIVE PILLS: ICD-10-CM

## 2025-03-25 DIAGNOSIS — N89.8 VAGINAL DISCHARGE: ICD-10-CM

## 2025-03-25 DIAGNOSIS — Z98.891 H/O CESAREAN SECTION: ICD-10-CM

## 2025-03-25 RX ORDER — DESOGESTREL AND ETHINYL ESTRADIOL 0.15-0.03
1 KIT ORAL DAILY
Qty: 84 TABLET | Refills: 3 | Status: SHIPPED | OUTPATIENT
Start: 2025-03-25 | End: 2026-03-25

## 2025-03-26 LAB
BV SCORE VAG QL: NORMAL
T VAGINALIS RRNA SPEC QL NAA+PROBE: NOT DETECTED

## 2025-04-07 ENCOUNTER — APPOINTMENT (OUTPATIENT)
Dept: OBSTETRICS AND GYNECOLOGY | Facility: CLINIC | Age: 25
End: 2025-04-07
Payer: COMMERCIAL

## 2025-05-21 ENCOUNTER — TELEPHONE (OUTPATIENT)
Dept: OBSTETRICS AND GYNECOLOGY | Facility: CLINIC | Age: 25
End: 2025-05-21
Payer: COMMERCIAL

## 2025-05-21 NOTE — TELEPHONE ENCOUNTER
Pt with fam hx of breast and ovarian cancer, and has what appears to be a pimple on her breast that has been present for about a year. Added on 6/2/25 for eval and possible genetic testing

## 2025-05-21 NOTE — TELEPHONE ENCOUNTER
Patient has questions about genetic testing for caner and would like a call to discuss what that process looks like. Patient has family history of breast and ovarian cancer.

## 2025-05-27 NOTE — PROGRESS NOTES
Babs Brown is a 25 y.o. year old female patient with concerns for breast changes as well as ongoing abd and pelvic pain since LTCS.     She presents for concern of a breast problem.   She states she noticed breast pain and a pimple on the left breast. Breastfeeding. Nursing more on the right side than the left. No fever/no chills/no S/S of mastitis.   Concerns for cancer, mother of pt with GYN cancer of unknown origin with Hysterectomy.   Pt states she is having vague symptoms of concern.   Pelvic pain/abd pain and fatigue that has been going on. Previous ultrasound was completed 3/2025 WNL. Pt states the pain has been daily, the pain will take an entire day for abdominal pain to resolve. NSAIDs and Tylenol doesn't alleviate the pain.     Menses are occurring every months, lasting 4-5 days.   Single partner.   Taking OCPs daily.     Cancer-related family history includes Bone cancer in her paternal grandmother; Cancer in an other family member; Stomach cancer in her maternal grandmother and mother.   Medical History[1]   Surgical History[2]     Genetic testing includes: not complete     Physical Exam  Genitourinary:      Genitourinary Comments: Small pimple at 12 o'clock, no breast mass, skin change only    Breasts:     Right: Normal. Present. No swelling, bleeding, inverted nipple, mass, nipple discharge, skin change, tenderness or breast implant.      Left: Present. Skin change present. No swelling, bleeding, inverted nipple, mass, nipple discharge, tenderness or breast implant.   Lymphadenopathy:      Upper Body:      Right upper body: No supraclavicular or axillary adenopathy.      Left upper body: No supraclavicular or axillary adenopathy.          Review of Systems     Problem List Items Addressed This Visit    None  Visit Diagnoses         Pelvic pain    -  Primary    Relevant Orders    Urine culture    US PELVIS TRANSABDOMINAL WITH TRANSVAGINAL      Abdominal pain, unspecified abdominal location         Relevant Orders    US PELVIS TRANSABDOMINAL WITH TRANSVAGINAL      Breast pain, left        Relevant Orders    BI US breast limited left           * No order type specified *  No follow-ups on file.     Pelvic pain:  Repeat GYN ultrasound and follow up consult with physician  Pending U/A  Declines testing for STIs    Breast left breast ultrasound for reassurance with family hx of breast cancer. Still lactating at this time.    Luisa Farah, APRN-CNM, APRN-CNP          [1]   Past Medical History:  Diagnosis Date    ADHD     Anxiety     Depression     Encounter for  screening for malformations 2022    Encounter for  screening for malformations    Migraine    [2]   Past Surgical History:  Procedure Laterality Date     SECTION, LOW TRANSVERSE  2024     SECTION, LOW TRANSVERSE  2023    DILATION AND CURETTAGE OF UTERUS      FOOT FRACTURE SURGERY Right

## 2025-06-02 ENCOUNTER — APPOINTMENT (OUTPATIENT)
Dept: OBSTETRICS AND GYNECOLOGY | Facility: CLINIC | Age: 25
End: 2025-06-02
Payer: COMMERCIAL

## 2025-06-02 ENCOUNTER — TELEPHONE (OUTPATIENT)
Dept: OBSTETRICS AND GYNECOLOGY | Facility: CLINIC | Age: 25
End: 2025-06-02

## 2025-06-02 VITALS — BODY MASS INDEX: 34.33 KG/M2 | SYSTOLIC BLOOD PRESSURE: 122 MMHG | DIASTOLIC BLOOD PRESSURE: 80 MMHG | WEIGHT: 200 LBS

## 2025-06-02 DIAGNOSIS — N64.4 BREAST PAIN, LEFT: ICD-10-CM

## 2025-06-02 DIAGNOSIS — R10.2 PELVIC PAIN: Primary | ICD-10-CM

## 2025-06-02 DIAGNOSIS — R10.9 ABDOMINAL PAIN, UNSPECIFIED ABDOMINAL LOCATION: ICD-10-CM

## 2025-06-02 PROCEDURE — 99214 OFFICE O/P EST MOD 30 MIN: CPT | Performed by: NURSE PRACTITIONER

## 2025-06-02 PROCEDURE — 1036F TOBACCO NON-USER: CPT | Performed by: NURSE PRACTITIONER

## 2025-06-02 NOTE — TELEPHONE ENCOUNTER
Patient calling about cancer history she said her mom had ovarian cancer and had a hysterectomy but no chemo. Do you need anymore family info.    She would also like to know what else she she can take for pain when it gets really bad she take 1000 mg tylenol and 800mg of ibuprofen

## 2025-06-04 LAB — BACTERIA UR CULT: NORMAL

## 2025-06-10 ENCOUNTER — HOSPITAL ENCOUNTER (OUTPATIENT)
Dept: RADIOLOGY | Facility: HOSPITAL | Age: 25
Discharge: HOME | End: 2025-06-10
Payer: COMMERCIAL

## 2025-06-10 ENCOUNTER — TELEPHONE (OUTPATIENT)
Dept: OBSTETRICS AND GYNECOLOGY | Facility: CLINIC | Age: 25
End: 2025-06-10
Payer: COMMERCIAL

## 2025-06-10 DIAGNOSIS — R10.9 ABDOMINAL PAIN, UNSPECIFIED ABDOMINAL LOCATION: ICD-10-CM

## 2025-06-10 DIAGNOSIS — R10.2 PELVIC PAIN: ICD-10-CM

## 2025-06-10 DIAGNOSIS — N64.4 BREAST PAIN, LEFT: ICD-10-CM

## 2025-06-10 PROCEDURE — 76642 ULTRASOUND BREAST LIMITED: CPT | Mod: LT

## 2025-06-10 PROCEDURE — 76830 TRANSVAGINAL US NON-OB: CPT | Performed by: STUDENT IN AN ORGANIZED HEALTH CARE EDUCATION/TRAINING PROGRAM

## 2025-06-10 PROCEDURE — 76642 ULTRASOUND BREAST LIMITED: CPT | Mod: LEFT SIDE

## 2025-06-10 PROCEDURE — 76856 US EXAM PELVIC COMPLETE: CPT

## 2025-06-10 PROCEDURE — 76856 US EXAM PELVIC COMPLETE: CPT | Performed by: STUDENT IN AN ORGANIZED HEALTH CARE EDUCATION/TRAINING PROGRAM

## 2025-06-10 NOTE — TELEPHONE ENCOUNTER
----- Message from Luisa Farah sent at 6/10/2025 12:39 PM EDT -----  Let Babs know the ultrasound was normal  [facility]  ----- Message -----  From: Interface, Radiology Results In  Sent: 6/10/2025  11:09 AM EDT  To: Luisa Farah, APRN-SERAM, APRN-CNP

## 2025-06-12 NOTE — TELEPHONE ENCOUNTER
Pt notified of normal result from breast ultrasound. Pt is concerned about the breast ultrasound being limited to a small area of irritation rather than imaging of the full breast as she was expecting. She also asked about results from her pelvic ultrasound. I notified her it was in your inbox but you had not reviewed it yet and we would be contacting her when you had. She noted concern that it indicated an IUD in the liver. We reviewed the report and under endometrium it does state IUD in liver but is not addressed in the impression. Babs is very sure she has only ever had one IUD and you removed it in the office and it was intact. We called [facility] to review images and addend the report if appropriate.

## 2025-06-13 ENCOUNTER — TELEPHONE (OUTPATIENT)
Dept: OBSTETRICS AND GYNECOLOGY | Facility: CLINIC | Age: 25
End: 2025-06-13
Payer: COMMERCIAL

## 2025-06-13 DIAGNOSIS — N64.4 MASTODYNIA: ICD-10-CM

## 2025-06-16 ENCOUNTER — APPOINTMENT (OUTPATIENT)
Dept: OBSTETRICS AND GYNECOLOGY | Facility: CLINIC | Age: 25
End: 2025-06-16
Payer: COMMERCIAL

## 2025-06-16 DIAGNOSIS — Z80.3 FAMILY HISTORY OF BREAST CANCER: ICD-10-CM

## 2025-06-16 DIAGNOSIS — Z80.41 FAMILY HISTORY OF OVARIAN CANCER: ICD-10-CM

## 2025-06-20 ENCOUNTER — APPOINTMENT (OUTPATIENT)
Dept: OBSTETRICS AND GYNECOLOGY | Facility: CLINIC | Age: 25
End: 2025-06-20
Payer: COMMERCIAL

## 2025-06-20 ENCOUNTER — PREP FOR PROCEDURE (OUTPATIENT)
Dept: OBSTETRICS AND GYNECOLOGY | Facility: CLINIC | Age: 25
End: 2025-06-20

## 2025-06-20 VITALS
BODY MASS INDEX: 33.66 KG/M2 | SYSTOLIC BLOOD PRESSURE: 110 MMHG | DIASTOLIC BLOOD PRESSURE: 80 MMHG | WEIGHT: 202 LBS | HEIGHT: 65 IN

## 2025-06-20 DIAGNOSIS — R10.2 PELVIC PAIN IN FEMALE: Primary | ICD-10-CM

## 2025-06-20 DIAGNOSIS — N70.11 HYDROSALPINX: ICD-10-CM

## 2025-06-20 DIAGNOSIS — R10.2 PELVIC PAIN: Primary | ICD-10-CM

## 2025-06-20 PROCEDURE — 3008F BODY MASS INDEX DOCD: CPT | Performed by: OBSTETRICS & GYNECOLOGY

## 2025-06-20 PROCEDURE — 1036F TOBACCO NON-USER: CPT | Performed by: OBSTETRICS & GYNECOLOGY

## 2025-06-20 PROCEDURE — 99214 OFFICE O/P EST MOD 30 MIN: CPT | Performed by: OBSTETRICS & GYNECOLOGY

## 2025-06-20 RX ORDER — CELECOXIB 400 MG/1
400 CAPSULE ORAL ONCE
OUTPATIENT
Start: 2025-06-20 | End: 2025-06-20

## 2025-06-20 RX ORDER — GABAPENTIN 600 MG/1
600 TABLET ORAL ONCE
OUTPATIENT
Start: 2025-06-20 | End: 2025-06-20

## 2025-06-20 RX ORDER — ACETAMINOPHEN 325 MG/1
975 TABLET ORAL ONCE
OUTPATIENT
Start: 2025-06-20 | End: 2025-06-20

## 2025-06-20 NOTE — PROGRESS NOTES
Subjective   Patient ID: Babs Brown is a 25 y.o. female who presents for No chief complaint on file..  HPI 25 years old  2 prior C-sections presents with lower abdominal cramping and pain.  She saw one of the midwife had a pelvic ultrasound done and was referred here for consultation possible surgery.  She states that she had an IUD which was removed in April this year.  She says because of her pain and constant cramping, it was felt that her pain was due to IUD and this was removed.  However, she continues to have lower abdominal cramping pressure and pain.  She says Tylenol and Motrin are not helping.  She reports her cycles are normal and she has switched to OCP since the IUD was removed.     Review of Systems   Genitourinary:  Positive for pelvic pain.   All other systems reviewed and are negative.      Objective   Physical Exam  Constitutional:       General: Distressed: gyn.      Appearance: Normal appearance.   HENT:      Head: Normocephalic and atraumatic.   Cardiovascular:      Rate and Rhythm: Normal rate and regular rhythm.   Pulmonary:      Effort: Pulmonary effort is normal.      Breath sounds: Normal breath sounds.   Abdominal:      General: Abdomen is flat. Bowel sounds are normal.      Palpations: Abdomen is soft.   Musculoskeletal:      Cervical back: Normal range of motion and neck supple.   Skin:     General: Skin is warm and dry.   Neurological:      General: No focal deficit present.      Mental Status: She is alert.   Psychiatric:         Mood and Affect: Mood normal.         Behavior: Behavior normal.         Assessment/Plan   Problem List Items Addressed This Visit    None  Visit Diagnoses         Codes      Pelvic pain    -  Primary R10.2      Hydrosalpinx     N70.11        We reviewed her pelvic ultrasound which shows most likely a right hydrosalpinx this corresponds well to her symptoms.  We discussed possible causes for hydrosalpinx including prior PID, STDs, surgery, congenital  and rarely IUDs.  I have recommended diagnostic laparoscopy with a right salpingectomy.  We discussed the surgery in detail, risks pros and cons and she wants to proceed.  Consent was signed.         Artie Tyson MD 06/20/25 3:21 PM

## 2025-06-21 DIAGNOSIS — R10.2 PELVIC PAIN: Primary | ICD-10-CM

## 2025-06-21 RX ORDER — TRAMADOL HYDROCHLORIDE 50 MG/1
50 TABLET, FILM COATED ORAL EVERY 6 HOURS PRN
Qty: 15 TABLET | Refills: 0 | Status: SHIPPED | OUTPATIENT
Start: 2025-06-21 | End: 2025-06-28

## 2025-06-26 ENCOUNTER — TELEPHONE (OUTPATIENT)
Dept: OBSTETRICS AND GYNECOLOGY | Facility: CLINIC | Age: 25
End: 2025-06-26
Payer: COMMERCIAL

## 2025-06-26 ENCOUNTER — PREP FOR PROCEDURE (OUTPATIENT)
Dept: OBSTETRICS AND GYNECOLOGY | Facility: CLINIC | Age: 25
End: 2025-06-26
Payer: COMMERCIAL

## 2025-06-26 DIAGNOSIS — R10.2 PELVIC PAIN: ICD-10-CM

## 2025-06-26 RX ORDER — TRAMADOL HYDROCHLORIDE 50 MG/1
50 TABLET, FILM COATED ORAL EVERY 6 HOURS PRN
Qty: 15 TABLET | Refills: 0 | Status: SHIPPED | OUTPATIENT
Start: 2025-06-26 | End: 2025-07-02 | Stop reason: SDUPTHER

## 2025-06-26 NOTE — TELEPHONE ENCOUNTER
Spoke with patient. States she is looking to get surgery scheduled, but our surgery scheduler is off until Monday.     Looking to get a general idea of a plan because she is in severe pain at times. Got a prescription of Tramadol from on call doctor last Saturday, but only has 5 left. According to Priya, covering for Marie for surgery scheduling, we are into August for surgery scheduling. Patient would like refill of tramadol to get her through to surgery. She is only taking as needed, but some days that is every 6 hours.

## 2025-06-27 NOTE — TELEPHONE ENCOUNTER
Pt notified of the following recommendation from Dr. Tyson:  She needs to come in and sign consent for drugs and give a urine for tox screen prior to next refill until surgery.   I advised Babs that once her surgery is scheduled, if she feels she will need a refill of Tramadol before surgery date, to call office so we can arrange to have her sign consent and order urine tox screen. Pt verbalizes understanding and is agreeable.

## 2025-07-01 ENCOUNTER — APPOINTMENT (OUTPATIENT)
Dept: NEUROLOGY | Facility: HOSPITAL | Age: 25
End: 2025-07-01
Payer: COMMERCIAL

## 2025-07-01 ENCOUNTER — TELEPHONE (OUTPATIENT)
Dept: OBSTETRICS AND GYNECOLOGY | Facility: CLINIC | Age: 25
End: 2025-07-01
Payer: COMMERCIAL

## 2025-07-01 NOTE — TELEPHONE ENCOUNTER
Unable to complete controlled substance agreement as nurse visit. Requires physician visit to link agreement. See pended consent.

## 2025-07-01 NOTE — TELEPHONE ENCOUNTER
Patient of Dr. Tyson is to be scheduled for a SALPINGECTOMY, LAPAROSCOPIC.  Patient states that she is one day late for her period and took a couple of pregnancy test that have a faint line.  Patient would like an order for a blood test

## 2025-07-01 NOTE — TELEPHONE ENCOUNTER
Patient calling in stating she started her period, and does not need to a blood test, she is asking about coming in for urine test and to sign consent for more refills of Tramadol, please advise if that is a provider office visit or nurse visit?

## 2025-07-02 ENCOUNTER — CLINICAL SUPPORT (OUTPATIENT)
Dept: OBSTETRICS AND GYNECOLOGY | Facility: CLINIC | Age: 25
End: 2025-07-02
Payer: COMMERCIAL

## 2025-07-02 VITALS — HEART RATE: 92 BPM | SYSTOLIC BLOOD PRESSURE: 110 MMHG | DIASTOLIC BLOOD PRESSURE: 80 MMHG

## 2025-07-02 DIAGNOSIS — R10.2 ACUTE PAIN IN FEMALE PELVIS: ICD-10-CM

## 2025-07-02 PROBLEM — N70.11 HYDROSALPINX: Status: ACTIVE | Noted: 2025-06-20

## 2025-07-02 LAB — PREGNANCY TEST URINE, POC: NEGATIVE

## 2025-07-02 PROCEDURE — 81025 URINE PREGNANCY TEST: CPT | Performed by: OBSTETRICS & GYNECOLOGY

## 2025-07-02 RX ORDER — TRAMADOL HYDROCHLORIDE 50 MG/1
50 TABLET, FILM COATED ORAL EVERY 6 HOURS PRN
Qty: 15 TABLET | Refills: 0 | Status: SHIPPED | OUTPATIENT
Start: 2025-07-02 | End: 2025-07-09

## 2025-07-02 NOTE — PROGRESS NOTES
Babs came in today to give a urine sample for a drug screen to continue to receive Tramadol for pelvic pain while awaiting surgery. Surgery scheduling is expected to contact her today. She verbalizes that she had a positive pregnancy test at home yesterday but then started her period and took a second pregnancy test that was negative. She was agreeable to a urine pregnancy test today as well and this was negative. She currently rates her pain a 3/10 and did take Tramadol this morning. Pain was 10/10 upon waking this morning. She has been needing to take the Tramadol q6 hrs to control her right sided pelvic pain and has 3-5 tablets left. She is also experiencing increased bleeding with this period starting yesterday and at times is soaking a pad per hour. Urine collected is dark pink with blood. Pt denies clots. Occasional lightheadedness and shortness of breath when taking the stairs. HR and BP WNL. Hx anemia. Pt scheduled 7/8/25 to complete Controlled substance agreement with Dr. Tyson. Babs is interested in alternatives to Tramadol to control pain. Pt verbalizes she tried tylenol and motrin but had gastrointestinal upset with this, and it did not help control pain. She is planning to go ahead with a planned camping trip this weekend but is concerned about how her pain will affect this. I advised I would review with Dr. Tyson then contact her with any additional recommendations.

## 2025-07-02 NOTE — TELEPHONE ENCOUNTER
I contacted Babs with Dr. Tyson's recommendation to take it easy until next office visit and notified her that Dr. Tyson did send a new prescription for Tramadol. Pt asked about trying cannabis for pain control in addition to Tramadol as she is concerned about addiction potential with Tramadol. I recommended she not use cannabis and discuss during her upcoming visit with Dr. Tyson. We discussed limiting activity to what she feels is tolerable and trying heat or ice application, warm baths, or gentle yoga as additional methods of pain control during her upcoming camping trip. I also advised she monitor her bleeding and report to ER for evaluation if she is continuously soaking a pad per hour, becomes more lightheaded/dizzy or short of breath.  Pt verbalizes understanding and is agreeable.

## 2025-07-03 ENCOUNTER — APPOINTMENT (OUTPATIENT)
Dept: SURGICAL ONCOLOGY | Facility: HOSPITAL | Age: 25
End: 2025-07-03
Payer: COMMERCIAL

## 2025-07-06 LAB
AMPHETAMINES UR QL: NEGATIVE NG/ML
BARBITURATES UR QL: NEGATIVE NG/ML
BENZODIAZ UR QL: NEGATIVE NG/ML
BZE UR QL: NEGATIVE NG/ML
CREAT UR-MCNC: 140.3 MG/DL
DRUG SCREEN COMMENT UR-IMP: ABNORMAL
FENTANYL UR QL SCN: NEGATIVE NG/ML
METHADONE UR QL: NEGATIVE NG/ML
OPIATES UR QL: NEGATIVE NG/ML
OXIDANTS UR QL: NEGATIVE MCG/ML
OXYCODONE UR QL: NEGATIVE NG/ML
PCP UR QL: NEGATIVE NG/ML
PH UR: 7 [PH] (ref 4.5–9)
QUEST NOTES AND COMMENTS: ABNORMAL
THC UR QL: POSITIVE NG/ML
THC UR-MCNC: 184 NG/ML

## 2025-07-08 ENCOUNTER — APPOINTMENT (OUTPATIENT)
Dept: OBSTETRICS AND GYNECOLOGY | Facility: CLINIC | Age: 25
End: 2025-07-08
Payer: COMMERCIAL

## 2025-07-08 ENCOUNTER — TELEPHONE (OUTPATIENT)
Dept: OBSTETRICS AND GYNECOLOGY | Facility: CLINIC | Age: 25
End: 2025-07-08

## 2025-07-08 VITALS — SYSTOLIC BLOOD PRESSURE: 104 MMHG | DIASTOLIC BLOOD PRESSURE: 68 MMHG

## 2025-07-08 DIAGNOSIS — N70.11 HYDROSALPINX: ICD-10-CM

## 2025-07-08 DIAGNOSIS — R10.2 PELVIC PAIN: ICD-10-CM

## 2025-07-08 DIAGNOSIS — R10.2 PELVIC PAIN: Primary | ICD-10-CM

## 2025-07-08 PROCEDURE — 1036F TOBACCO NON-USER: CPT | Performed by: OBSTETRICS & GYNECOLOGY

## 2025-07-08 PROCEDURE — 99212 OFFICE O/P EST SF 10 MIN: CPT | Performed by: OBSTETRICS & GYNECOLOGY

## 2025-07-08 RX ORDER — TRAMADOL HYDROCHLORIDE 50 MG/1
50 TABLET, FILM COATED ORAL EVERY 6 HOURS PRN
Qty: 15 TABLET | Refills: 0 | Status: SHIPPED | OUTPATIENT
Start: 2025-07-08 | End: 2025-07-14 | Stop reason: SDUPTHER

## 2025-07-08 NOTE — PROGRESS NOTES
Subjective   Patient ID: Babs Brown is a 25 y.o. female who presents for sign controlled drug agreement  (Here to sign control drug agreement ).  HPI 25-year-old G3, P2 with chronic pelvic pain, hydrosalpinx who is a scheduled for surgery is here to sign consent for tramadol prescription.  I discussed her urine tox screen with her which was positive for THC.  She says she only used it once for pain but it did not help her.  She says that Motrin and Tylenol do not work and since she has been on tramadol she has not used THC.  She understands increased risk for addiction and drug interactions and complication with prolonged use of controlled substances and mixing up medication.    Review of Systems   Genitourinary:  Positive for pelvic pain.   All other systems reviewed and are negative.      Objective   Physical Exam  Constitutional:       Appearance: Normal appearance.   Pulmonary:      Effort: Pulmonary effort is normal.   Neurological:      Mental Status: She is alert.         Assessment/Plan   Problem List Items Addressed This Visit           ICD-10-CM    Hydrosalpinx N70.11     Other Visit Diagnoses         Codes      Pelvic pain    -  Primary R10.2        Patient signed consent form for tramadol to only get a prescription from me until her surgery.  Will plan to reschedule her surgery earlier if possible or if any cancellation.  She is going to stay away from [facility] while she is using tramadol.         Artie Tyson MD 07/08/25 11:41 AM

## 2025-07-08 NOTE — TELEPHONE ENCOUNTER
Patient states she was distracted with her children during visit, and forgot to find out when/ if Tramadol will be refilled?     She also mentions testing positive for marijuana, tried as a way to cope with pain she is having, she is asking if another drug test is needed to show this is out of her system

## 2025-07-11 ENCOUNTER — ANESTHESIA EVENT (OUTPATIENT)
Dept: OPERATING ROOM | Facility: HOSPITAL | Age: 25
End: 2025-07-11
Payer: COMMERCIAL

## 2025-07-14 ENCOUNTER — TELEPHONE (OUTPATIENT)
Dept: OBSTETRICS AND GYNECOLOGY | Facility: CLINIC | Age: 25
End: 2025-07-14
Payer: COMMERCIAL

## 2025-07-14 DIAGNOSIS — R10.2 PELVIC PAIN: ICD-10-CM

## 2025-07-14 RX ORDER — TRAMADOL HYDROCHLORIDE 50 MG/1
50 TABLET, FILM COATED ORAL EVERY 6 HOURS PRN
Qty: 15 TABLET | Refills: 0 | Status: SHIPPED | OUTPATIENT
Start: 2025-07-14 | End: 2025-07-21

## 2025-07-14 NOTE — TELEPHONE ENCOUNTER
Babs called in because she only has one tramadol left and is in severe pain. She has been taking motrin and tylenol as well to help control pain and reduce tramadol use as instructed, but is experiencing stomach upset and diarrhea when taking motrin. She is scheduled for diagnostic lap and right salpingectomy on 7/16/25. Advised to try imodium if diarrhea continues and I would ask Dr. Tyson if she could prescribe tramadol.

## 2025-07-16 ENCOUNTER — ANESTHESIA (OUTPATIENT)
Dept: OPERATING ROOM | Facility: HOSPITAL | Age: 25
End: 2025-07-16
Payer: COMMERCIAL

## 2025-07-18 ENCOUNTER — TELEPHONE (OUTPATIENT)
Dept: OBSTETRICS AND GYNECOLOGY | Facility: CLINIC | Age: 25
End: 2025-07-18
Payer: COMMERCIAL

## 2025-07-18 NOTE — TELEPHONE ENCOUNTER
I contacted Babs to let her know I have not received information regarding insurance coverage for her procedure. Pt verbalizes understanding. She also expresses a need for additional Tramadol for pain control as her pain is increasing and she is concerned her condition is worsening. Advised I would reach out to Dr. Tyson who is unavailable today but on call tomorrow. Pt verbalizes she has enough to get through until tomorrow and is agreeable.

## 2025-07-19 NOTE — PROGRESS NOTES
I called her that can not send another Tramadol until Monday.  Encouraged her to use Tylenol and motrin first line.  Will call Monday to see how she is doing.   Awaiting insurance clearance for surgery.

## 2025-07-21 DIAGNOSIS — R10.2 PELVIC PAIN: ICD-10-CM

## 2025-07-21 RX ORDER — TRAMADOL HYDROCHLORIDE 50 MG/1
50 TABLET, FILM COATED ORAL EVERY 6 HOURS PRN
Qty: 10 TABLET | Refills: 0 | Status: SHIPPED | OUTPATIENT
Start: 2025-07-21 | End: 2025-07-23 | Stop reason: HOSPADM

## 2025-07-21 NOTE — TELEPHONE ENCOUNTER
Patient called in to see if surgery was approved, messaged Marie to find out, she stated yes, surgery is approved.     Patient states she spoke with Dr. Tyson on Saturday regarding pain and receiving refill of Tramadol Dr. Tyson told her to try to wait it out until today, then medication can be refilled today if needed. She is requesting this to be sent to [facility] in [place]

## 2025-07-23 ENCOUNTER — PHARMACY VISIT (OUTPATIENT)
Dept: PHARMACY | Facility: CLINIC | Age: 25
End: 2025-07-23
Payer: MEDICAID

## 2025-07-23 ENCOUNTER — HOSPITAL ENCOUNTER (OUTPATIENT)
Facility: HOSPITAL | Age: 25
Setting detail: OUTPATIENT SURGERY
Discharge: HOME | End: 2025-07-23
Attending: OBSTETRICS & GYNECOLOGY | Admitting: OBSTETRICS & GYNECOLOGY
Payer: COMMERCIAL

## 2025-07-23 VITALS
WEIGHT: 202 LBS | HEIGHT: 64 IN | HEART RATE: 83 BPM | TEMPERATURE: 97.6 F | OXYGEN SATURATION: 100 % | SYSTOLIC BLOOD PRESSURE: 116 MMHG | BODY MASS INDEX: 34.49 KG/M2 | DIASTOLIC BLOOD PRESSURE: 77 MMHG | RESPIRATION RATE: 18 BRPM

## 2025-07-23 DIAGNOSIS — N70.11 HYDROSALPINX: ICD-10-CM

## 2025-07-23 DIAGNOSIS — Z98.890 STATUS POST LAPAROSCOPY: ICD-10-CM

## 2025-07-23 DIAGNOSIS — R10.2 PELVIC PAIN IN FEMALE: Primary | ICD-10-CM

## 2025-07-23 LAB
ERYTHROCYTE [DISTWIDTH] IN BLOOD BY AUTOMATED COUNT: 13.2 % (ref 11.5–14.5)
HCT VFR BLD AUTO: 39.9 % (ref 36–46)
HGB BLD-MCNC: 13.7 G/DL (ref 12–16)
MCH RBC QN AUTO: 29.1 PG (ref 26–34)
MCHC RBC AUTO-ENTMCNC: 34.3 G/DL (ref 32–36)
MCV RBC AUTO: 85 FL (ref 80–100)
NRBC BLD-RTO: 0 /100 WBCS (ref 0–0)
PLATELET # BLD AUTO: 261 X10*3/UL (ref 150–450)
PREGNANCY TEST URINE, POC: NEGATIVE
RBC # BLD AUTO: 4.7 X10*6/UL (ref 4–5.2)
WBC # BLD AUTO: 5.7 X10*3/UL (ref 4.4–11.3)

## 2025-07-23 PROCEDURE — 88305 TISSUE EXAM BY PATHOLOGIST: CPT | Mod: TC,PORLAB | Performed by: OBSTETRICS & GYNECOLOGY

## 2025-07-23 PROCEDURE — 2720000007 HC OR 272 NO HCPCS: Performed by: OBSTETRICS & GYNECOLOGY

## 2025-07-23 PROCEDURE — 96372 THER/PROPH/DIAG INJ SC/IM: CPT

## 2025-07-23 PROCEDURE — 7100000002 HC RECOVERY ROOM TIME - EACH INCREMENTAL 1 MINUTE: Performed by: OBSTETRICS & GYNECOLOGY

## 2025-07-23 PROCEDURE — 36415 COLL VENOUS BLD VENIPUNCTURE: CPT | Performed by: ANESTHESIOLOGY

## 2025-07-23 PROCEDURE — 2500000004 HC RX 250 GENERAL PHARMACY W/ HCPCS (ALT 636 FOR OP/ED)

## 2025-07-23 PROCEDURE — 3700000001 HC GENERAL ANESTHESIA TIME - INITIAL BASE CHARGE: Performed by: OBSTETRICS & GYNECOLOGY

## 2025-07-23 PROCEDURE — 2500000001 HC RX 250 WO HCPCS SELF ADMINISTERED DRUGS (ALT 637 FOR MEDICARE OP): Performed by: ANESTHESIOLOGY

## 2025-07-23 PROCEDURE — RXMED WILLOW AMBULATORY MEDICATION CHARGE

## 2025-07-23 PROCEDURE — 81025 URINE PREGNANCY TEST: CPT | Performed by: ANESTHESIOLOGY

## 2025-07-23 PROCEDURE — 85027 COMPLETE CBC AUTOMATED: CPT | Performed by: ANESTHESIOLOGY

## 2025-07-23 PROCEDURE — 2500000004 HC RX 250 GENERAL PHARMACY W/ HCPCS (ALT 636 FOR OP/ED): Performed by: ANESTHESIOLOGY

## 2025-07-23 PROCEDURE — 2500000005 HC RX 250 GENERAL PHARMACY W/O HCPCS

## 2025-07-23 PROCEDURE — 7100000001 HC RECOVERY ROOM TIME - INITIAL BASE CHARGE: Performed by: OBSTETRICS & GYNECOLOGY

## 2025-07-23 PROCEDURE — 58662 LAPAROSCOPY EXCISE LESIONS: CPT | Performed by: OBSTETRICS & GYNECOLOGY

## 2025-07-23 PROCEDURE — 3600000004 HC OR TIME - INITIAL BASE CHARGE - PROCEDURE LEVEL FOUR: Performed by: OBSTETRICS & GYNECOLOGY

## 2025-07-23 PROCEDURE — 7100000009 HC PHASE TWO TIME - INITIAL BASE CHARGE: Performed by: OBSTETRICS & GYNECOLOGY

## 2025-07-23 PROCEDURE — 2500000005 HC RX 250 GENERAL PHARMACY W/O HCPCS: Performed by: ANESTHESIOLOGY

## 2025-07-23 PROCEDURE — 3600000009 HC OR TIME - EACH INCREMENTAL 1 MINUTE - PROCEDURE LEVEL FOUR: Performed by: OBSTETRICS & GYNECOLOGY

## 2025-07-23 PROCEDURE — 7100000010 HC PHASE TWO TIME - EACH INCREMENTAL 1 MINUTE: Performed by: OBSTETRICS & GYNECOLOGY

## 2025-07-23 PROCEDURE — 2500000001 HC RX 250 WO HCPCS SELF ADMINISTERED DRUGS (ALT 637 FOR MEDICARE OP): Performed by: OBSTETRICS & GYNECOLOGY

## 2025-07-23 PROCEDURE — 3700000002 HC GENERAL ANESTHESIA TIME - EACH INCREMENTAL 1 MINUTE: Performed by: OBSTETRICS & GYNECOLOGY

## 2025-07-23 RX ORDER — PROCHLORPERAZINE EDISYLATE 5 MG/ML
5 INJECTION INTRAMUSCULAR; INTRAVENOUS ONCE AS NEEDED
Status: DISCONTINUED | OUTPATIENT
Start: 2025-07-23 | End: 2025-07-23 | Stop reason: HOSPADM

## 2025-07-23 RX ORDER — FAMOTIDINE 10 MG/ML
20 INJECTION, SOLUTION INTRAVENOUS ONCE
Status: COMPLETED | OUTPATIENT
Start: 2025-07-23 | End: 2025-07-23

## 2025-07-23 RX ORDER — ACETAMINOPHEN 325 MG/1
975 TABLET ORAL ONCE
Status: DISCONTINUED | OUTPATIENT
Start: 2025-07-23 | End: 2025-07-23 | Stop reason: HOSPADM

## 2025-07-23 RX ORDER — ONDANSETRON HYDROCHLORIDE 2 MG/ML
4 INJECTION, SOLUTION INTRAVENOUS ONCE AS NEEDED
Status: DISCONTINUED | OUTPATIENT
Start: 2025-07-23 | End: 2025-07-23 | Stop reason: HOSPADM

## 2025-07-23 RX ORDER — SODIUM CHLORIDE, SODIUM LACTATE, POTASSIUM CHLORIDE, CALCIUM CHLORIDE 600; 310; 30; 20 MG/100ML; MG/100ML; MG/100ML; MG/100ML
125 INJECTION, SOLUTION INTRAVENOUS CONTINUOUS
Status: DISCONTINUED | OUTPATIENT
Start: 2025-07-23 | End: 2025-07-23 | Stop reason: HOSPADM

## 2025-07-23 RX ORDER — MIDAZOLAM HYDROCHLORIDE 1 MG/ML
INJECTION, SOLUTION INTRAMUSCULAR; INTRAVENOUS AS NEEDED
Status: DISCONTINUED | OUTPATIENT
Start: 2025-07-23 | End: 2025-07-23

## 2025-07-23 RX ORDER — HYDROMORPHONE HYDROCHLORIDE 0.2 MG/ML
0.2 INJECTION INTRAMUSCULAR; INTRAVENOUS; SUBCUTANEOUS EVERY 5 MIN PRN
Status: DISCONTINUED | OUTPATIENT
Start: 2025-07-23 | End: 2025-07-23 | Stop reason: HOSPADM

## 2025-07-23 RX ORDER — FENTANYL CITRATE 50 UG/ML
INJECTION, SOLUTION INTRAMUSCULAR; INTRAVENOUS AS NEEDED
Status: DISCONTINUED | OUTPATIENT
Start: 2025-07-23 | End: 2025-07-23

## 2025-07-23 RX ORDER — CELECOXIB 200 MG/1
400 CAPSULE ORAL ONCE
Status: COMPLETED | OUTPATIENT
Start: 2025-07-23 | End: 2025-07-23

## 2025-07-23 RX ORDER — LIDOCAINE HYDROCHLORIDE 20 MG/ML
INJECTION, SOLUTION EPIDURAL; INFILTRATION; INTRACAUDAL; PERINEURAL AS NEEDED
Status: DISCONTINUED | OUTPATIENT
Start: 2025-07-23 | End: 2025-07-23

## 2025-07-23 RX ORDER — SODIUM CITRATE AND CITRIC ACID MONOHYDRATE 334; 500 MG/5ML; MG/5ML
30 SOLUTION ORAL ONCE
Status: COMPLETED | OUTPATIENT
Start: 2025-07-23 | End: 2025-07-23

## 2025-07-23 RX ORDER — KETOROLAC TROMETHAMINE 30 MG/ML
INJECTION, SOLUTION INTRAMUSCULAR; INTRAVENOUS AS NEEDED
Status: DISCONTINUED | OUTPATIENT
Start: 2025-07-23 | End: 2025-07-23

## 2025-07-23 RX ORDER — TRAMADOL HYDROCHLORIDE 50 MG/1
50 TABLET, FILM COATED ORAL EVERY 6 HOURS PRN
Qty: 15 TABLET | Refills: 0 | Status: SHIPPED | OUTPATIENT
Start: 2025-07-23

## 2025-07-23 RX ORDER — GABAPENTIN 300 MG/1
600 CAPSULE ORAL ONCE
Status: COMPLETED | OUTPATIENT
Start: 2025-07-23 | End: 2025-07-23

## 2025-07-23 RX ORDER — IBUPROFEN 400 MG/1
800 TABLET, FILM COATED ORAL EVERY 8 HOURS PRN
Qty: 21 TABLET | Refills: 1 | Status: SHIPPED | OUTPATIENT
Start: 2025-07-23

## 2025-07-23 RX ORDER — ROCURONIUM BROMIDE 50 MG/5 ML
SYRINGE (ML) INTRAVENOUS AS NEEDED
Status: DISCONTINUED | OUTPATIENT
Start: 2025-07-23 | End: 2025-07-23

## 2025-07-23 RX ORDER — PROPOFOL 10 MG/ML
INJECTION, EMULSION INTRAVENOUS AS NEEDED
Status: DISCONTINUED | OUTPATIENT
Start: 2025-07-23 | End: 2025-07-23

## 2025-07-23 RX ORDER — METOCLOPRAMIDE HYDROCHLORIDE 5 MG/ML
10 INJECTION INTRAMUSCULAR; INTRAVENOUS ONCE
Status: COMPLETED | OUTPATIENT
Start: 2025-07-23 | End: 2025-07-23

## 2025-07-23 RX ORDER — ONDANSETRON HYDROCHLORIDE 2 MG/ML
4 INJECTION, SOLUTION INTRAVENOUS ONCE
Status: COMPLETED | OUTPATIENT
Start: 2025-07-23 | End: 2025-07-23

## 2025-07-23 RX ORDER — ALBUTEROL SULFATE 0.83 MG/ML
2.5 SOLUTION RESPIRATORY (INHALATION) ONCE
Status: DISCONTINUED | OUTPATIENT
Start: 2025-07-23 | End: 2025-07-23 | Stop reason: HOSPADM

## 2025-07-23 RX ADMIN — SUGAMMADEX 200 MG: 100 INJECTION, SOLUTION INTRAVENOUS at 09:25

## 2025-07-23 RX ADMIN — FENTANYL CITRATE 100 MCG: 50 INJECTION INTRAMUSCULAR; INTRAVENOUS at 08:24

## 2025-07-23 RX ADMIN — HYDROMORPHONE HYDROCHLORIDE 0.5 MG: 1 INJECTION, SOLUTION INTRAMUSCULAR; INTRAVENOUS; SUBCUTANEOUS at 10:30

## 2025-07-23 RX ADMIN — CELECOXIB 400 MG: 200 CAPSULE ORAL at 07:32

## 2025-07-23 RX ADMIN — HYDROMORPHONE HYDROCHLORIDE 0.5 MG: 1 INJECTION, SOLUTION INTRAMUSCULAR; INTRAVENOUS; SUBCUTANEOUS at 09:48

## 2025-07-23 RX ADMIN — METOCLOPRAMIDE 10 MG: 5 INJECTION, SOLUTION INTRAMUSCULAR; INTRAVENOUS at 07:33

## 2025-07-23 RX ADMIN — Medication 50 MG: at 08:25

## 2025-07-23 RX ADMIN — GABAPENTIN 600 MG: 300 CAPSULE ORAL at 07:32

## 2025-07-23 RX ADMIN — KETOROLAC TROMETHAMINE 30 MG: 30 INJECTION, SOLUTION INTRAMUSCULAR at 09:13

## 2025-07-23 RX ADMIN — SODIUM CITRATE AND CITRIC ACID MONOHYDRATE 30 ML: 500; 334 SOLUTION ORAL at 07:34

## 2025-07-23 RX ADMIN — LIDOCAINE HYDROCHLORIDE 80 MG: 20 INJECTION, SOLUTION EPIDURAL; INFILTRATION; INTRACAUDAL; PERINEURAL at 08:24

## 2025-07-23 RX ADMIN — Medication 3 DOSE: at 07:31

## 2025-07-23 RX ADMIN — HYDROMORPHONE HYDROCHLORIDE 0.2 MG: 0.2 INJECTION, SOLUTION INTRAMUSCULAR; INTRAVENOUS; SUBCUTANEOUS at 10:05

## 2025-07-23 RX ADMIN — SODIUM CHLORIDE, SODIUM LACTATE, POTASSIUM CHLORIDE, AND CALCIUM CHLORIDE 125 ML/HR: .6; .31; .03; .02 INJECTION, SOLUTION INTRAVENOUS at 07:34

## 2025-07-23 RX ADMIN — FAMOTIDINE 20 MG: 10 INJECTION, SOLUTION INTRAVENOUS at 07:33

## 2025-07-23 RX ADMIN — DEXAMETHASONE SODIUM PHOSPHATE 8 MG: 4 INJECTION, SOLUTION INTRAMUSCULAR; INTRAVENOUS at 07:32

## 2025-07-23 RX ADMIN — PROPOFOL 20 MG: 10 INJECTION, EMULSION INTRAVENOUS at 08:34

## 2025-07-23 RX ADMIN — PROPOFOL 180 MG: 10 INJECTION, EMULSION INTRAVENOUS at 08:24

## 2025-07-23 RX ADMIN — MIDAZOLAM 2 MG: 1 INJECTION INTRAMUSCULAR; INTRAVENOUS at 08:17

## 2025-07-23 RX ADMIN — ONDANSETRON 4 MG: 2 INJECTION, SOLUTION INTRAMUSCULAR; INTRAVENOUS at 07:33

## 2025-07-23 RX ADMIN — SODIUM CHLORIDE, POTASSIUM CHLORIDE, SODIUM LACTATE AND CALCIUM CHLORIDE: 600; 310; 30; 20 INJECTION, SOLUTION INTRAVENOUS at 08:16

## 2025-07-23 SDOH — HEALTH STABILITY: MENTAL HEALTH: CURRENT SMOKER: 0

## 2025-07-23 ASSESSMENT — PAIN SCALES - GENERAL
PAIN_LEVEL: 4
PAINLEVEL_OUTOF10: 0 - NO PAIN
PAINLEVEL_OUTOF10: 7
PAINLEVEL_OUTOF10: 4
PAINLEVEL_OUTOF10: 2
PAINLEVEL_OUTOF10: 9
PAINLEVEL_OUTOF10: 5 - MODERATE PAIN
PAINLEVEL_OUTOF10: 6

## 2025-07-23 ASSESSMENT — PAIN DESCRIPTION - DESCRIPTORS
DESCRIPTORS: ACHING
DESCRIPTORS: PATIENT UNABLE TO DESCRIBE

## 2025-07-23 ASSESSMENT — COLUMBIA-SUICIDE SEVERITY RATING SCALE - C-SSRS
1. IN THE PAST MONTH, HAVE YOU WISHED YOU WERE DEAD OR WISHED YOU COULD GO TO SLEEP AND NOT WAKE UP?: NO
2. HAVE YOU ACTUALLY HAD ANY THOUGHTS OF KILLING YOURSELF?: NO
6. HAVE YOU EVER DONE ANYTHING, STARTED TO DO ANYTHING, OR PREPARED TO DO ANYTHING TO END YOUR LIFE?: NO

## 2025-07-23 ASSESSMENT — PAIN - FUNCTIONAL ASSESSMENT
PAIN_FUNCTIONAL_ASSESSMENT: 0-10
PAIN_FUNCTIONAL_ASSESSMENT: 0-10

## 2025-07-23 NOTE — ANESTHESIA PREPROCEDURE EVALUATION
Patient: Babs Brown    Procedure Information       Date/Time: 07/23/25 0800    Procedure: diagnostic laparoscopy with a right salpingectomy (Right: Pelvis) - diagnostic laparoscopy with a right salpingectomy    Location: [facility] OR 02 / Virtual [facility]    Surgeons: Artie Tyson MD            Relevant Problems   Anesthesia (within normal limits)      Hematology   (+) Anemia affecting pregnancy in third trimester       Clinical information reviewed:   Tobacco  Allergies  Meds  Problems  Med Hx  Surg Hx  OB Status    Fam Hx  Soc Hx        NPO Detail:  NPO/Void Status  Date of Last Liquid: 07/22/25  Time of Last Liquid: 2230         Physical Exam    Airway  Mallampati: I  TM distance: >3 FB  Neck ROM: full  Mouth opening: 3 or more finger widths     Cardiovascular - normal exam   Dental - normal exam     Pulmonary - normal exam   Abdominal - normal examBowel sounds: normal           Anesthesia Plan    History of general anesthesia?: yes  History of complications of general anesthesia?: no    ASA 1     general     The patient is not a current smoker.  Patient was not previously instructed to abstain from smoking on day of procedure.  Patient did not smoke on day of procedure.    intravenous induction   Anesthetic plan and risks discussed with patient.  Use of blood products discussed with patient who consented to blood products.    Plan discussed with CRNA.

## 2025-07-23 NOTE — ANESTHESIA POSTPROCEDURE EVALUATION
Patient: Babs Brown    Procedure Summary       Date: 07/23/25 Room / Location: [facility] 02 / [facility]    Anesthesia Start: 0817 Anesthesia Stop: 0937    Procedure: diagnostic laparoscopy with lysis of adhesions (Right: Pelvis) Diagnosis:       Pelvic pain in female      Hydrosalpinx      (Pelvic pain in female [R10.2])      (Hydrosalpinx [N70.11])    Surgeons: Artie Tyson MD Responsible Provider: EMILIO Jarvis    Anesthesia Type: general ASA Status: 1            Anesthesia Type: general    Vitals Value Taken Time   /87 07/23/25 10:10   Temp 36.3 °C (97.4 °F) 07/23/25 09:32   Pulse 81 07/23/25 10:19   Resp 11 07/23/25 10:19   SpO2 100 % 07/23/25 10:19   Vitals shown include unfiled device data.    Anesthesia Post Evaluation    Patient location during evaluation: PACU  Patient participation: complete - patient participated  Level of consciousness: awake and alert  Pain score: 4  Pain management: satisfactory to patient  Airway patency: patent  Cardiovascular status: hemodynamically stable  Respiratory status: room air  Hydration status: acceptable  Postoperative Nausea and Vomiting: none        There were no known notable events for this encounter.

## 2025-07-23 NOTE — ANESTHESIA PROCEDURE NOTES
Airway  Date/Time: 7/23/2025 8:26 AM  Reason: elective    Airway not difficult    Staffing  Performed: SRNA   Authorized by: KUMAR Jarvis-CRNA    Performed by: Nat Perez RN  Patient location during procedure: OR    Patient Condition  Indications for airway management: anesthesia  Patient position: sniffing  Planned trial extubation  Sedation level: deep     Final Airway Details   Preoxygenated: yes  Final airway type: endotracheal airway  Successful airway: ETT  Cuffed: yes   Successful intubation technique: direct laryngoscopy  Adjuncts used in placement: intubating stylet  Endotracheal tube insertion site: oral  Blade: Bradford  Blade size: #3  ETT size (mm): 7.0  Placement verified by: chest auscultation and capnometry   Measured from: lips  ETT to lips (cm): 20  Number of attempts at approach: 1  Number of other approaches attempted: 0    Additional Comments  Atraumatic, dentition and soft tissue as preop following airway placement. Neck remained neutral throughout.

## 2025-07-23 NOTE — H&P
"History Of Present Illness  Babs Brown is a 25 y.o. female presenting with RLQ pain.   She has abnormal U/s showing hydrosalpinx.   .     Past Medical History  Medical History[1]    Surgical History  Surgical History[2]   Csection x 2  Social History  She reports that she has never smoked. She has never used smokeless tobacco. She reports that she does not currently use alcohol. She reports that she does not currently use drugs after having used the following drugs: Marijuana.    Family History  Family History[3]   Non contributory  Allergies  Topamax [topiramate]    Review of Systems   Negative  Physical Exam   A/O x 3 in nAD  Lungs CTA  Heart RRR    Last Recorded Vitals  Blood pressure 108/73, pulse 79, temperature 36.6 °C (97.9 °F), temperature source Temporal, resp. rate 20, height 1.626 m (5' 4\"), weight 91.6 kg (202 lb), last menstrual period 2025, SpO2 100%, currently breastfeeding.    Relevant Results          Assessment & Plan  Pelvic pain in female    Hydrosalpinx  For diagnostic laparoscopy possible right salpingectomy possible open.     I spent 5 minutes in the professional and overall care of this patient.      Artie Tyson MD         [1]   Past Medical History:  Diagnosis Date    ADHD     Anxiety     Depression     Encounter for  screening for malformations 2022    Encounter for  screening for malformations    Migraine    [2]   Past Surgical History:  Procedure Laterality Date     SECTION, LOW TRANSVERSE  2024     SECTION, LOW TRANSVERSE  2023    DILATION AND CURETTAGE OF UTERUS      FOOT FRACTURE SURGERY Right    [3]   Family History  Problem Relation Name Age of Onset    Depression Mother      Blood clot Mother      Anxiety disorder Mother      Stomach cancer Mother      Other (heart condition) Father      Heart disease Father      Asthma Sister          Ovarian cysts    Sleep apnea Mother's Sister      Spina bifida Mother's Brother   "    Diabetes Maternal Grandmother      Gout Maternal Grandmother      Stomach cancer Maternal Grandmother      Diabetes Maternal Grandfather      Kidney failure Maternal Grandfather      Bone cancer Paternal Grandmother      Spina bifida Other Cousin     Leukemia Other Maternal aunt     Cancer Other maternala aunt

## 2025-07-23 NOTE — DISCHARGE INSTRUCTIONS
Ice to incision x 24 hours.  No sexual activity for 2 weeks.  No heavy lifting for couple of days.  May resume light activities as tolerated.  No driving for 24 hours.  Call with any fever or persistent nausea and vomiting.

## 2025-07-23 NOTE — OP NOTE
Date: 2025  OR Location: [facility]    Name: Babs Brown, : 2000, Age: 25 y.o., MRN: [id number], Sex: female    Diagnosis  Pre-op Diagnosis      * Pelvic pain in female [R10.2]     * Hydrosalpinx [N70.11] Post-op Diagnosis     * Pelvic pain in female [R10.2]     * Hydrosalpinx [N70.11]     Procedures  diagnostic laparoscopy with a right salpingectomy, lysis of adhesions  57850 - GA LAPAROSCOPY W/RMVL ADNEXAL STRUCTURES  Diagnostic laparoscopy, lysis of adhesion, right ovarian cystectomy.  Tubal diet studies.    Surgeons      * Artie Tyson - Primary    Resident/Fellow/Other Assistant:  Surgeons and Role:  * No surgeons found with a matching role *    Staff:   Circulator: Kathy Mirza Person: Juliet  Surgical Assistant: Marce  Surgical Assistant: Tani    Anesthesia Staff: CRNA: KUMAR Jarvis-CRNA    Procedure Summary  Anesthesia: General  ASA: I  Estimated Blood Loss: 1 mL urine output 200 mL  Intra-op Medications:   Administrations occurring from 0800 to 0905 on 25:   Medication Name Total Dose   dexmedeTOMIDine (Precedex) bolus from bag 20 mcg   fentaNYL (Sublimaze) injection 50 mcg/mL 100 mcg   LR bolus Cannot be calculated   lidocaine PF (Xylocaine-MPF) local injection 2 % 80 mg   midazolam (Versed) injection 1 mg/mL 2 mg   propofol (Diprivan) injection 10 mg/mL 200 mg   rocuronium (Zemuron) 50 mg/5 mL prefilled syringe 50 mg              Anesthesia Record               Intraprocedure I/O Totals          Intake    Dexmedetomidine 0.00 mL    The total shown is the total volume documented since Anesthesia Start was filed.    Total Intake 0 mL          Specimen:   ID Type Source Tests Collected by Time   1 : RIGHT OVARIAN CYST Tissue CYST [facility] SURGICAL PATHOLOGY EXAM Artie Tyson MD 2025 0910                 Procedure Details:  The patient was seen in the preoperative area. The site of surgery was properly noted/marked if necessary per policy. The patient has been actively  warmed in preoperative area. Preoperative antibiotics are not indicated. Venous thrombosis prophylaxis have been ordered including bilateral sequential compression devices    Patient was brought to the operating room and huddle was done.  After general anesthesia was done patient was put in the dorsolithotomy position prepped and draped in usual fashion.  A timeout was done.    Exam under anesthesia showed uterus is small anteverted no adnexal masses.  A weighted speculum was inserted into the vagina cervix was visualized anteriorly was grasped with single-tooth tenaculum endocervical canal was gently dilated and a uterine manipulator was inserted rest of the instrument were removed from the vagina.  A 5 mm incision was made infraumbilically with a scalpel using Veress needle pneumoperitoneum was obtained position was confirmed with the laparoscope through 5 mm trocar.  2 other incision were made one 8 mm on the patient's left lower quadrant and the other one 5 mm on the patient right lower quadrant and trocars were introduced into the abdomen under direct vision.  At this time using the LigaSure omental adhesion to the uterine fundus was cauterized and released pictures were taken and there was no evidence of hydrosalpinx on the right side but a 2 cm cyst on the left ovary which was consistent with persistent corpus luteal cyst.  Right tube and ovary normal.  Using the LigaSure and the cyst base was cauterized and cut and removed and sent to pathology.  Hemostasis was assured.  Using dilute solution of methylene blue insertion through the uterus I was able to visualize dye coming through the fimbriae on both side showing patent bilateral tubes.  At this time copious irrigation was done gas was lowered no excess bleeding.  Pictures were retaken.  All the instruments were removed from the abdomen counts were correct x 2 gas was allowed to release.  Skin incision were closed off with 4-0 Vicryl.  Uterine manipulator  was removed.  Patient was sent to recovery room awake in stable condition.  Findings: Laparoscopic procedure showed omental adhesion to the uterine fundus of the left 2 cm ovarian cyst no evidence of right hydrosalpinx.  Normal bilateral tubes with dye coming through consistent with patent fallopian tubes.  Normal bilateral ovaries.  Normal upper abdomen normal appendix.      Complications:  None; patient tolerated the procedure well.     Disposition: PACU - hemodynamically stable.  Condition: stable

## 2025-07-24 ENCOUNTER — PHARMACY VISIT (OUTPATIENT)
Dept: PHARMACY | Facility: CLINIC | Age: 25
End: 2025-07-24
Payer: COMMERCIAL

## 2025-07-24 ENCOUNTER — DOCUMENTATION (OUTPATIENT)
Dept: POSTOP/PACU | Facility: HOSPITAL | Age: 25
End: 2025-07-24
Payer: COMMERCIAL

## 2025-07-24 PROCEDURE — RXMED WILLOW AMBULATORY MEDICATION CHARGE

## 2025-07-25 ENCOUNTER — TELEPHONE (OUTPATIENT)
Dept: OBSTETRICS AND GYNECOLOGY | Facility: CLINIC | Age: 25
End: 2025-07-25
Payer: COMMERCIAL

## 2025-07-25 NOTE — TELEPHONE ENCOUNTER
Babs called with concerns of post operative bleeding after 7/23/25 Diagnostic laparoscopy, lysis of adhesion, right ovarian cystectomy, tubal diet studies. She states she was spotting yesterday and today bleeding increased to bright red heavier bleeding. She wears depends rather than pads but has not bled through her depends. She is due to start a period in 3 days. Denies fever or chills, denies passing clots, denies feeling lightheaded or faint, racing heart, or visual disturbances. I reached out to Dr. Marr who advised this is likely a period brought on early by surgery and not an abnormal finding. I passed this information on to Babs and advised she report to ER for evaluation if bleeding increases or she begins passing clots, she develops a fever or chills, or begins  feeling lightheaded or faint, racing heart, or starts seeing spots. Pt verbalizes understanding and is agreeable.

## 2025-07-29 ENCOUNTER — APPOINTMENT (OUTPATIENT)
Dept: OBSTETRICS AND GYNECOLOGY | Facility: CLINIC | Age: 25
End: 2025-07-29
Payer: COMMERCIAL

## 2025-08-05 ENCOUNTER — APPOINTMENT (OUTPATIENT)
Dept: OBSTETRICS AND GYNECOLOGY | Facility: CLINIC | Age: 25
End: 2025-08-05
Payer: COMMERCIAL

## 2025-08-12 ENCOUNTER — APPOINTMENT (OUTPATIENT)
Dept: OBSTETRICS AND GYNECOLOGY | Facility: CLINIC | Age: 25
End: 2025-08-12
Payer: COMMERCIAL

## 2025-09-02 ENCOUNTER — APPOINTMENT (OUTPATIENT)
Dept: OBSTETRICS AND GYNECOLOGY | Facility: CLINIC | Age: 25
End: 2025-09-02
Payer: COMMERCIAL

## 2025-10-06 ENCOUNTER — APPOINTMENT (OUTPATIENT)
Dept: OBSTETRICS AND GYNECOLOGY | Facility: CLINIC | Age: 25
End: 2025-10-06
Payer: COMMERCIAL

## 2025-10-09 ENCOUNTER — APPOINTMENT (OUTPATIENT)
Facility: CLINIC | Age: 25
End: 2025-10-09
Payer: COMMERCIAL

## 2025-10-22 ENCOUNTER — APPOINTMENT (OUTPATIENT)
Dept: NEUROLOGY | Facility: HOSPITAL | Age: 25
End: 2025-10-22
Payer: COMMERCIAL

## (undated) DEVICE — APPLICATOR, CHLORAPREP, W/ORANGE TINT, 26ML

## (undated) DEVICE — SCISSOR TIP, ENDOCUT, LAPAROSCOPIC

## (undated) DEVICE — SLEEVE, KII, Z-THREAD, 5X100CM

## (undated) DEVICE — DRESSING, GAUZE, SPONGE, 8 PLY, CURITY, 2 X 2 IN, STERILE

## (undated) DEVICE — SOLUTION, CHLORHEXIDINE, 4%, 4OZ

## (undated) DEVICE — PREP TRAY, SKIN, DRY, W/GLOVES

## (undated) DEVICE — SOLUTION, IRRIGATION, SODIUM CHLORIDE 0.9%, 1000 ML, POUR BOTTLE

## (undated) DEVICE — MANIPULATOR, UTERINE, INJECTOR, 4.5 MM X 33 CM, DISPOSABLE, STERILE

## (undated) DEVICE — SYRINGE, 10 CC, LUER LOCK

## (undated) DEVICE — DEVICE, VOYANT, 5MM X 37CM

## (undated) DEVICE — SUTURE, VICRYL, 4-0, 18 IN, PS2, UNDYED

## (undated) DEVICE — CATHETER, RED RUBBER 16FR

## (undated) DEVICE — TOWEL PACK, STERILE, 4/PACK, BLUE

## (undated) DEVICE — COVER HANDLE LIGHT, STERIS, BLUE, STERILE

## (undated) DEVICE — DRAPE, UNDERBUTTOCKS, W/ 27IN FLUID POUCH

## (undated) DEVICE — TROCAR, KII OPTICAL BLADELESS 5MM Z THREAD 100MM LNGTH

## (undated) DEVICE — GLOVE, PROTEXIS PI CLASSIC, SZ-6.5, PF, LF

## (undated) DEVICE — STRIP, SKIN CLOSURE, STERI STRIP, REINFORCED, 0.5 X 4 IN

## (undated) DEVICE — TROCAR, KII OPTICAL SEPARATOR, 8MM X 100MM,  Z-THREAD

## (undated) DEVICE — ADHESIVE, SKIN, MASTISOL, 2/3 CC VIAL

## (undated) DEVICE — KIT, GYNECOLOGY LAPAROSCOPY, CUSTOM, PORTAGE

## (undated) DEVICE — DRAPE, LEGGINGS, 48 X 31 IN, STERILE, LF

## (undated) DEVICE — DRESSING, TRANSPARENT, TEGADERM, 2-3/8 X 2-3/4 IN

## (undated) DEVICE — NEEDLE, INSUFFLATION, 13GAX120MM, DISP